# Patient Record
Sex: FEMALE | Race: WHITE | Employment: OTHER | URBAN - METROPOLITAN AREA
[De-identification: names, ages, dates, MRNs, and addresses within clinical notes are randomized per-mention and may not be internally consistent; named-entity substitution may affect disease eponyms.]

---

## 2017-08-28 ENCOUNTER — HOSPITAL ENCOUNTER (OUTPATIENT)
Dept: WOUND CARE | Facility: CLINIC | Age: 82
Discharge: HOME | End: 2017-08-28
Attending: PHYSICIAN ASSISTANT | Admitting: PHYSICIAN ASSISTANT
Payer: MEDICARE

## 2017-08-28 VITALS
HEART RATE: 83 BPM | DIASTOLIC BLOOD PRESSURE: 76 MMHG | SYSTOLIC BLOOD PRESSURE: 158 MMHG | TEMPERATURE: 96.8 F | RESPIRATION RATE: 18 BRPM

## 2017-08-28 PROCEDURE — 99203 OFFICE O/P NEW LOW 30 MIN: CPT | Mod: 25 | Performed by: PHYSICIAN ASSISTANT

## 2017-08-28 PROCEDURE — 11042 DBRDMT SUBQ TIS 1ST 20SQCM/<: CPT

## 2017-08-28 PROCEDURE — A6209 FOAM DRSG <=16 SQ IN W/O BDR: HCPCS

## 2017-08-28 PROCEDURE — 11042 DBRDMT SUBQ TIS 1ST 20SQCM/<: CPT | Performed by: PHYSICIAN ASSISTANT

## 2017-08-28 PROCEDURE — 27211191 ZZH COFLEX 2 LAYER COMPRESSION WRAP

## 2017-08-28 PROCEDURE — 99213 OFFICE O/P EST LOW 20 MIN: CPT | Mod: 25

## 2017-08-28 NOTE — PROGRESS NOTES
[facility]    HISTORY OF PRESENT ILLNESS: Ms. Sheila Johnson is a 89-year-old woman who presents today with a wound on her right lower leg. She initially bumped her leg about a month ago and then again in the same spot two weeks ago. She has had help dressing the wound by her family members who have been applying bacitracin to the wound.     Sheila has been diagnosed with PAD in the past but was told that she didn't need any interventions or vascular follow-up. She reports that her legs get tired with walking but denies cramping. Denies history of blood clots or vascular procedures. She always has some lower leg swelling at baseline but has never worn compression socks consistently.     DATE WOUND ACQUIRED: 7/28/17    PAST MEDICAL HISTORY: bilateral peripheral edema, CKD stage 3, type 2 DM, dissection of vertebral artery, dyslipidemia, HTN, lung nodule, PAD    SOCIAL HISTORY: recently moved to [place], has family in [facility] who can help with dressings as needed    MEDICATIONS:   Current Outpatient Prescriptions   Medication     HYDROcodone-acetaminophen 5-325 MG per tablet     aspirin 81 MG chewable tablet     cyclobenzaprine (FLEXERIL) 10 MG tablet     multivitamin, therapeutic with minerals (MULTI-VITAMIN) TABS     Acetaminophen (TYLENOL PO)     ATORVASTATIN CALCIUM PO     CITALOPRAM HYDROBROMIDE PO     GLIMEPIRIDE PO     insulin glargine (LANTUS) 100 UNIT/ML injection     LISINOPRIL PO     POTASSIUM CHLORIDE NORMAN CR PO     No current facility-administered medications for this encounter.        REVIEW OF SYSTEMS:  CONSTITUTIONAL: Denies fevers or acute illness  ENDOCRINE: blood sugars in good control with insulin    VITALS: /76  Pulse 83  Temp 96.8  F (36  C) (Temporal)  Resp 18    PHYSICAL EXAM:  GENERAL: Patient is alert and oriented and in no acute distress  CARDIOVASCULAR: absent RLL pedal pulses, foot is warm, 3+ pitting edema  INTEGUMENTARY:   WOUND ASSESSMENT:     Location:  right lateral lower leg     Stage/Thickness: Full    Size: 3.1 cm x 1.4 cm with a depth of 0.1 cm    Drainage: copious amount of serosanguinous drainage    Wound description: 90% moist yellow slough 10% granulation tissue, very TTP    PROCEDURE: Per standing orders, topical 4% lidocaine was applied to the wound by the [facility]. Timeout was called and informed consent was obtained. Using a 15 blade a surgical debridement was performed down to and including subcutaneous tissue of <20 cm2. Hemostasis was achieved with pressure. The patient tolerated the procedure well.     ASSESSMENT: traumatic ulcer of right lower leg with fat-layer exposed, DM 2, PAD, peripheral edema    PLAN:   1. Primary dressing: PolyMem; Secondary dressin-layer CoFlex compression wrap  2. May need a local block for future debridements if wound fails to clean up sufficiently with PolyMem  3. If fails to progress will consider further vascular workup     FOLLOW-UP: 3 weeks (with weekly nurse visits)    SHIV MELCHOR PA-C

## 2017-09-05 ENCOUNTER — HOSPITAL ENCOUNTER (OUTPATIENT)
Dept: WOUND CARE | Facility: CLINIC | Age: 82
Discharge: HOME | End: 2017-09-05
Attending: PHYSICIAN ASSISTANT | Admitting: PHYSICIAN ASSISTANT
Payer: MEDICARE

## 2017-09-05 PROCEDURE — 27211191 ZZH COFLEX 2 LAYER COMPRESSION WRAP

## 2017-09-05 PROCEDURE — 97602 WOUND(S) CARE NON-SELECTIVE: CPT

## 2017-09-05 RX ORDER — CLOPIDOGREL BISULFATE 75 MG/1
75 TABLET ORAL DAILY
COMMUNITY
Start: 2015-05-11

## 2017-09-05 RX ORDER — FUROSEMIDE 20 MG
20 TABLET ORAL DAILY
COMMUNITY
Start: 2015-07-30 | End: 2021-01-01 | Stop reason: DRUGHIGH

## 2017-09-11 ENCOUNTER — HOSPITAL ENCOUNTER (OUTPATIENT)
Dept: WOUND CARE | Facility: CLINIC | Age: 82
Discharge: HOME | End: 2017-09-11
Attending: PHYSICIAN ASSISTANT | Admitting: PHYSICIAN ASSISTANT
Payer: MEDICARE

## 2017-09-11 DIAGNOSIS — I83.009 VENOUS ULCER (H): Primary | ICD-10-CM

## 2017-09-11 DIAGNOSIS — L97.909 VENOUS ULCER (H): Primary | ICD-10-CM

## 2017-09-11 PROCEDURE — A6252 ABSORPT DRG >16 <=48 W/O BDR: HCPCS

## 2017-09-11 PROCEDURE — 97602 WOUND(S) CARE NON-SELECTIVE: CPT

## 2017-09-11 PROCEDURE — 27211191 ZZH COFLEX 2 LAYER COMPRESSION WRAP

## 2017-09-11 PROCEDURE — A6209 FOAM DRSG <=16 SQ IN W/O BDR: HCPCS

## 2017-09-11 RX ORDER — HYDROCODONE BITARTRATE AND ACETAMINOPHEN 5; 325 MG/1; MG/1
1 TABLET ORAL EVERY 6 HOURS PRN
Qty: 15 TABLET | Refills: 0 | Status: ON HOLD | OUTPATIENT
Start: 2017-09-11 | End: 2018-04-25

## 2017-09-18 ENCOUNTER — HOSPITAL ENCOUNTER (OUTPATIENT)
Dept: WOUND CARE | Facility: CLINIC | Age: 82
Discharge: HOME | End: 2017-09-18
Attending: PHYSICIAN ASSISTANT | Admitting: PHYSICIAN ASSISTANT
Payer: MEDICARE

## 2017-09-18 VITALS
SYSTOLIC BLOOD PRESSURE: 156 MMHG | DIASTOLIC BLOOD PRESSURE: 71 MMHG | RESPIRATION RATE: 18 BRPM | HEART RATE: 78 BPM | TEMPERATURE: 98.3 F

## 2017-09-18 PROCEDURE — 97597 DBRDMT OPN WND 1ST 20 CM/<: CPT | Performed by: PHYSICIAN ASSISTANT

## 2017-09-18 PROCEDURE — 27211191 ZZH COFLEX 2 LAYER COMPRESSION WRAP

## 2017-09-18 PROCEDURE — A6021 COLLAGEN DRESSING <=16 SQ IN: HCPCS

## 2017-09-18 PROCEDURE — 97597 DBRDMT OPN WND 1ST 20 CM/<: CPT

## 2017-09-18 NOTE — PROGRESS NOTES
[facility]    HISTORY OF PRESENT ILLNESS: Ms. Sheila Johnson is a 89-year-old woman who returns today to follow-up on a wound on her right lower leg. The wound started after bumping into something, she believes it was a bed frame.     At her last nurse visit Sheila reported a significant increase in pain. There was evidence of cellulitis and Bactrim was prescribed. Since initiating antibiotics Sheila reports almost complete resolution of pain. Her wound has been decreasing in size    DATE WOUND ACQUIRED: 17    PAST MEDICAL HISTORY: bilateral peripheral edema, CKD stage 3, type 2 DM, dissection of vertebral artery, dyslipidemia, HTN, lung nodule, PAD    SOCIAL HISTORY: recently moved to [place], has family in town who can help with dressings as needed    MEDICATIONS: reviewed, see med rec for updated list    VITALS: /71  Pulse 78  Temp 98.3  F (36.8  C) (Temporal)  Resp 18    PHYSICAL EXAM:  GENERAL: Patient is alert and oriented and in no acute distress  INTEGUMENTARY:   WOUND ASSESSMENT:     Location: right lateral lower leg     Stage/Thickness: Full    Size: 4 cm x 1.5 cm with a depth of 0.1 cm    Drainage: copious amount of serosanguinous drainage    Wound description: primarily granulation tissue with focal areas of yellow slough, sue-wound skin is back to baseline erythema, no induration or warmth    PROCEDURE: A selective debridement was performed using a 15 blade to remove all non-viable tissue of <20 cm. Minimal bleeding was controlled with pressure. The patient tolerated the procedure well.    ASSESSMENT: improving traumatic ulcer of right lower leg with fat-layer exposed, DM 2, PAD, peripheral edema    PLAN:   1. Primary dressing: Endoform and ActiCoat 7; Secondary dressin-layer CoFlex compression wrap    FOLLOW-UP: 3 weeks (with weekly nurse visits)    SHIV MELCHOR PA-C

## 2017-09-27 ENCOUNTER — HOSPITAL ENCOUNTER (OUTPATIENT)
Dept: WOUND CARE | Facility: CLINIC | Age: 82
Discharge: HOME | End: 2017-09-27
Attending: PHYSICIAN ASSISTANT | Admitting: PHYSICIAN ASSISTANT
Payer: MEDICARE

## 2017-09-27 PROCEDURE — A6209 FOAM DRSG <=16 SQ IN W/O BDR: HCPCS

## 2017-09-27 PROCEDURE — 17250 CHEM CAUT OF GRANLTJ TISSUE: CPT

## 2017-09-27 PROCEDURE — 27211191 ZZH COFLEX 2 LAYER COMPRESSION WRAP

## 2017-10-04 ENCOUNTER — HOSPITAL ENCOUNTER (OUTPATIENT)
Dept: WOUND CARE | Facility: CLINIC | Age: 82
Discharge: HOME | End: 2017-10-04
Attending: PHYSICIAN ASSISTANT | Admitting: PHYSICIAN ASSISTANT
Payer: MEDICARE

## 2017-10-04 VITALS — HEART RATE: 90 BPM | SYSTOLIC BLOOD PRESSURE: 160 MMHG | DIASTOLIC BLOOD PRESSURE: 79 MMHG | TEMPERATURE: 98.1 F

## 2017-10-04 DIAGNOSIS — L03.115 CELLULITIS OF RIGHT LOWER EXTREMITY: Primary | ICD-10-CM

## 2017-10-04 DIAGNOSIS — L97.912 ULCER OF RIGHT LOWER EXTREMITY WITH FAT LAYER EXPOSED (H): ICD-10-CM

## 2017-10-04 PROCEDURE — 99213 OFFICE O/P EST LOW 20 MIN: CPT | Performed by: PHYSICIAN ASSISTANT

## 2017-10-04 PROCEDURE — 97602 WOUND(S) CARE NON-SELECTIVE: CPT

## 2017-10-04 PROCEDURE — A6197 ALGINATE DRSG >16 <=48 SQ IN: HCPCS

## 2017-10-04 RX ORDER — CEPHALEXIN 500 MG/1
500 CAPSULE ORAL 3 TIMES DAILY
Qty: 30 CAPSULE | Refills: 0 | Status: ON HOLD | OUTPATIENT
Start: 2017-10-04 | End: 2018-04-25

## 2017-10-04 NOTE — PROGRESS NOTES
[facility]    HISTORY OF PRESENT ILLNESS: Ms. Sheila Johnson is a 89-year-old woman who returns today to follow-up on a wound on her right lower leg. The wound started after bumping into something, she believes it was a bed frame.     Sheila was making significant progress since her episode of cellulitis last month but unfortunately started developing a lot of pain and drainage over the past few days.     DATE WOUND ACQUIRED: 7/28/17    PAST MEDICAL HISTORY: bilateral peripheral edema, CKD stage 3, type 2 DM, dissection of vertebral artery, dyslipidemia, HTN, lung nodule, PAD    SOCIAL HISTORY: recently moved to [place], has family in town who can help with dressings as needed    MEDICATIONS: reviewed, see med rec for updated list    VITALS: /79  Pulse 90  Temp 98.1  F (36.7  C) (Tympanic)    PHYSICAL EXAM:  GENERAL: Patient is alert and oriented and in no acute distress  INTEGUMENTARY:   WOUND ASSESSMENT:     Location: right lateral lower leg     Stage/Thickness: Full    Size: 6 cm x 6.4 cm with a depth of 0.1 cm    Drainage: copious amount of serosanguinous drainage    Wound description: friable granular wound bed that is very TTP, periwound skin is erythematous    PROCEDURE: Per standing order, topical 4% lidocaine was applied to the wound by the [facility]. The wound was mechanically debrided.     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 Primary dressing: SilverCel, Secondary dressing: CriticAid, gauze or ABD, spandagrip  2. Referral for home health care 3x/week  3. Keflex TID for cellulitis    FOLLOW-UP: 2 weeks    SHIV MELCHOR PA-C

## 2017-10-19 ENCOUNTER — HOSPITAL ENCOUNTER (OUTPATIENT)
Dept: WOUND CARE | Facility: CLINIC | Age: 82
Discharge: HOME | End: 2017-10-19
Attending: PHYSICIAN ASSISTANT | Admitting: PHYSICIAN ASSISTANT
Payer: MEDICARE

## 2017-10-19 VITALS — TEMPERATURE: 97.4 F | HEART RATE: 77 BPM | SYSTOLIC BLOOD PRESSURE: 164 MMHG | DIASTOLIC BLOOD PRESSURE: 71 MMHG

## 2017-10-19 DIAGNOSIS — R60.0 LOCALIZED EDEMA: ICD-10-CM

## 2017-10-19 DIAGNOSIS — I89.0 LYMPHEDEMA OF BOTH LOWER EXTREMITIES: ICD-10-CM

## 2017-10-19 DIAGNOSIS — S81.009D OPEN WOUND OF KNEE, LEG, AND ANKLE, UNSPECIFIED LATERALITY, SUBSEQUENT ENCOUNTER: Primary | ICD-10-CM

## 2017-10-19 DIAGNOSIS — M79.89 SWELLING OF LIMB: ICD-10-CM

## 2017-10-19 DIAGNOSIS — S91.009D OPEN WOUND OF KNEE, LEG, AND ANKLE, UNSPECIFIED LATERALITY, SUBSEQUENT ENCOUNTER: Primary | ICD-10-CM

## 2017-10-19 DIAGNOSIS — L97.301: ICD-10-CM

## 2017-10-19 DIAGNOSIS — I83.003 VENOUS ULCER OF ANKLE, UNSPECIFIED LATERALITY (H): ICD-10-CM

## 2017-10-19 DIAGNOSIS — L97.309 VENOUS ULCER OF ANKLE, UNSPECIFIED LATERALITY (H): ICD-10-CM

## 2017-10-19 DIAGNOSIS — S81.809D OPEN WOUND OF KNEE, LEG, AND ANKLE, UNSPECIFIED LATERALITY, SUBSEQUENT ENCOUNTER: Primary | ICD-10-CM

## 2017-10-19 PROCEDURE — 99211 OFF/OP EST MAY X REQ PHY/QHP: CPT

## 2017-10-19 PROCEDURE — 99212 OFFICE O/P EST SF 10 MIN: CPT | Performed by: PHYSICIAN ASSISTANT

## 2017-10-24 ENCOUNTER — MEDICAL CORRESPONDENCE (OUTPATIENT)
Dept: HEALTH INFORMATION MANAGEMENT | Facility: CLINIC | Age: 82
End: 2017-10-24

## 2018-01-09 ENCOUNTER — MEDICAL CORRESPONDENCE (OUTPATIENT)
Dept: HEALTH INFORMATION MANAGEMENT | Facility: CLINIC | Age: 83
End: 2018-01-09

## 2018-04-25 ENCOUNTER — APPOINTMENT (OUTPATIENT)
Dept: CT IMAGING | Facility: CLINIC | Age: 83
DRG: 041 | End: 2018-04-25
Attending: EMERGENCY MEDICINE
Payer: MEDICARE

## 2018-04-25 ENCOUNTER — HOSPITAL ENCOUNTER (INPATIENT)
Facility: CLINIC | Age: 83
LOS: 3 days | Discharge: HOME HEALTH | DRG: 041 | End: 2018-04-28
Attending: EMERGENCY MEDICINE | Admitting: INTERNAL MEDICINE
Payer: MEDICARE

## 2018-04-25 DIAGNOSIS — G45.9 TRANSIENT CEREBRAL ISCHEMIA, UNSPECIFIED TYPE: ICD-10-CM

## 2018-04-25 DIAGNOSIS — R47.01 APHASIA: ICD-10-CM

## 2018-04-25 LAB
ALBUMIN SERPL-MCNC: 3 G/DL (ref 3.4–5)
ALBUMIN UR-MCNC: 30 MG/DL
ALP SERPL-CCNC: 75 U/L (ref 40–150)
ALT SERPL W P-5'-P-CCNC: 14 U/L (ref 0–50)
ANION GAP SERPL CALCULATED.3IONS-SCNC: 6 MMOL/L (ref 3–14)
APPEARANCE UR: CLEAR
AST SERPL W P-5'-P-CCNC: 11 U/L (ref 0–45)
BACTERIA #/AREA URNS HPF: ABNORMAL /HPF
BASOPHILS # BLD AUTO: 0.1 10E9/L (ref 0–0.2)
BASOPHILS NFR BLD AUTO: 0.4 %
BILIRUB SERPL-MCNC: 0.6 MG/DL (ref 0.2–1.3)
BILIRUB UR QL STRIP: NEGATIVE
BUN SERPL-MCNC: 19 MG/DL (ref 7–30)
CALCIUM SERPL-MCNC: 9 MG/DL (ref 8.5–10.1)
CHLORIDE SERPL-SCNC: 96 MMOL/L (ref 94–109)
CO2 SERPL-SCNC: 31 MMOL/L (ref 20–32)
COLOR UR AUTO: ABNORMAL
CREAT SERPL-MCNC: 0.94 MG/DL (ref 0.52–1.04)
DIFFERENTIAL METHOD BLD: ABNORMAL
EOSINOPHIL # BLD AUTO: 0.1 10E9/L (ref 0–0.7)
EOSINOPHIL NFR BLD AUTO: 0.4 %
ERYTHROCYTE [DISTWIDTH] IN BLOOD BY AUTOMATED COUNT: 13.1 % (ref 10–15)
GFR SERPL CREATININE-BSD FRML MDRD: 56 ML/MIN/1.7M2
GLUCOSE BLDC GLUCOMTR-MCNC: 293 MG/DL (ref 70–99)
GLUCOSE SERPL-MCNC: 325 MG/DL (ref 70–99)
GLUCOSE UR STRIP-MCNC: >1000 MG/DL
HBA1C MFR BLD: 9.8 % (ref 0–6.4)
HCT VFR BLD AUTO: 40.1 % (ref 35–47)
HGB BLD-MCNC: 13.3 G/DL (ref 11.7–15.7)
HGB UR QL STRIP: NEGATIVE
HYALINE CASTS #/AREA URNS LPF: 1 /LPF (ref 0–2)
IMM GRANULOCYTES # BLD: 0 10E9/L (ref 0–0.4)
IMM GRANULOCYTES NFR BLD: 0.3 %
KETONES UR STRIP-MCNC: NEGATIVE MG/DL
LEUKOCYTE ESTERASE UR QL STRIP: NEGATIVE
LYMPHOCYTES # BLD AUTO: 3.1 10E9/L (ref 0.8–5.3)
LYMPHOCYTES NFR BLD AUTO: 23.1 %
MCH RBC QN AUTO: 29.4 PG (ref 26.5–33)
MCHC RBC AUTO-ENTMCNC: 33.2 G/DL (ref 31.5–36.5)
MCV RBC AUTO: 89 FL (ref 78–100)
MONOCYTES # BLD AUTO: 0.9 10E9/L (ref 0–1.3)
MONOCYTES NFR BLD AUTO: 6.3 %
MUCOUS THREADS #/AREA URNS LPF: PRESENT /LPF
NEUTROPHILS # BLD AUTO: 9.4 10E9/L (ref 1.6–8.3)
NEUTROPHILS NFR BLD AUTO: 69.5 %
NITRATE UR QL: NEGATIVE
NRBC # BLD AUTO: 0 10*3/UL
NRBC BLD AUTO-RTO: 0 /100
PH UR STRIP: 7 PH (ref 5–7)
PLATELET # BLD AUTO: 313 10E9/L (ref 150–450)
POTASSIUM SERPL-SCNC: 4 MMOL/L (ref 3.4–5.3)
PROT SERPL-MCNC: 7.6 G/DL (ref 6.8–8.8)
RBC # BLD AUTO: 4.52 10E12/L (ref 3.8–5.2)
RBC #/AREA URNS AUTO: 1 /HPF (ref 0–2)
SODIUM SERPL-SCNC: 133 MMOL/L (ref 133–144)
SOURCE: ABNORMAL
SP GR UR STRIP: 1.01 (ref 1–1.03)
SQUAMOUS #/AREA URNS AUTO: 3 /HPF (ref 0–1)
TROPONIN I SERPL-MCNC: 0.03 UG/L (ref 0–0.04)
UROBILINOGEN UR STRIP-MCNC: NORMAL MG/DL (ref 0–2)
WBC # BLD AUTO: 13.6 10E9/L (ref 4–11)
WBC #/AREA URNS AUTO: 3 /HPF (ref 0–5)

## 2018-04-25 PROCEDURE — 00000146 ZZHCL STATISTIC GLUCOSE BY METER IP

## 2018-04-25 PROCEDURE — 25000128 H RX IP 250 OP 636: Performed by: INTERNAL MEDICINE

## 2018-04-25 PROCEDURE — 80053 COMPREHEN METABOLIC PANEL: CPT | Performed by: EMERGENCY MEDICINE

## 2018-04-25 PROCEDURE — 70450 CT HEAD/BRAIN W/O DYE: CPT | Mod: XS

## 2018-04-25 PROCEDURE — 25000128 H RX IP 250 OP 636: Performed by: EMERGENCY MEDICINE

## 2018-04-25 PROCEDURE — 87040 BLOOD CULTURE FOR BACTERIA: CPT | Performed by: INTERNAL MEDICINE

## 2018-04-25 PROCEDURE — 85025 COMPLETE CBC W/AUTO DIFF WBC: CPT | Performed by: EMERGENCY MEDICINE

## 2018-04-25 PROCEDURE — 25000132 ZZH RX MED GY IP 250 OP 250 PS 637: Mod: GY | Performed by: EMERGENCY MEDICINE

## 2018-04-25 PROCEDURE — 25000131 ZZH RX MED GY IP 250 OP 636 PS 637: Mod: GY | Performed by: INTERNAL MEDICINE

## 2018-04-25 PROCEDURE — 25000125 ZZHC RX 250: Performed by: EMERGENCY MEDICINE

## 2018-04-25 PROCEDURE — 70460 CT HEAD/BRAIN W/DYE: CPT

## 2018-04-25 PROCEDURE — 36415 COLL VENOUS BLD VENIPUNCTURE: CPT | Performed by: INTERNAL MEDICINE

## 2018-04-25 PROCEDURE — 70496 CT ANGIOGRAPHY HEAD: CPT

## 2018-04-25 PROCEDURE — 25000125 ZZHC RX 250: Performed by: INTERNAL MEDICINE

## 2018-04-25 PROCEDURE — 99285 EMERGENCY DEPT VISIT HI MDM: CPT | Mod: 25

## 2018-04-25 PROCEDURE — 93010 ELECTROCARDIOGRAM REPORT: CPT | Performed by: INTERNAL MEDICINE

## 2018-04-25 PROCEDURE — 93005 ELECTROCARDIOGRAM TRACING: CPT

## 2018-04-25 PROCEDURE — 70450 CT HEAD/BRAIN W/O DYE: CPT

## 2018-04-25 PROCEDURE — 40000275 ZZH STATISTIC RCP TIME EA 10 MIN

## 2018-04-25 PROCEDURE — 99223 1ST HOSP IP/OBS HIGH 75: CPT | Mod: AI | Performed by: INTERNAL MEDICINE

## 2018-04-25 PROCEDURE — 83036 HEMOGLOBIN GLYCOSYLATED A1C: CPT | Performed by: EMERGENCY MEDICINE

## 2018-04-25 PROCEDURE — 12000000 ZZH R&B MED SURG/OB

## 2018-04-25 PROCEDURE — 84484 ASSAY OF TROPONIN QUANT: CPT | Performed by: INTERNAL MEDICINE

## 2018-04-25 PROCEDURE — A9270 NON-COVERED ITEM OR SERVICE: HCPCS | Mod: GY | Performed by: EMERGENCY MEDICINE

## 2018-04-25 PROCEDURE — 25000131 ZZH RX MED GY IP 250 OP 636 PS 637: Mod: GY

## 2018-04-25 PROCEDURE — 81001 URINALYSIS AUTO W/SCOPE: CPT | Performed by: EMERGENCY MEDICINE

## 2018-04-25 RX ORDER — FLUTICASONE PROPIONATE 50 MCG
2 SPRAY, SUSPENSION (ML) NASAL DAILY
COMMUNITY

## 2018-04-25 RX ORDER — CEPHALEXIN 500 MG/1
500 CAPSULE ORAL 3 TIMES DAILY
Status: CANCELLED | OUTPATIENT
Start: 2018-04-25

## 2018-04-25 RX ORDER — SODIUM CHLORIDE 9 MG/ML
INJECTION, SOLUTION INTRAVENOUS CONTINUOUS
Status: DISCONTINUED | OUTPATIENT
Start: 2018-04-25 | End: 2018-04-25

## 2018-04-25 RX ORDER — ONDANSETRON 4 MG/1
4 TABLET, ORALLY DISINTEGRATING ORAL EVERY 6 HOURS PRN
Status: DISCONTINUED | OUTPATIENT
Start: 2018-04-25 | End: 2018-04-28 | Stop reason: HOSPADM

## 2018-04-25 RX ORDER — ASPIRIN 300 MG/1
300 SUPPOSITORY RECTAL ONCE
Status: DISCONTINUED | OUTPATIENT
Start: 2018-04-25 | End: 2018-04-25

## 2018-04-25 RX ORDER — IOPAMIDOL 755 MG/ML
120 INJECTION, SOLUTION INTRAVASCULAR ONCE
Status: COMPLETED | OUTPATIENT
Start: 2018-04-25 | End: 2018-04-25

## 2018-04-25 RX ORDER — PROCHLORPERAZINE 25 MG
12.5 SUPPOSITORY, RECTAL RECTAL EVERY 12 HOURS PRN
Status: DISCONTINUED | OUTPATIENT
Start: 2018-04-25 | End: 2018-04-28 | Stop reason: HOSPADM

## 2018-04-25 RX ORDER — HYDRALAZINE HYDROCHLORIDE 20 MG/ML
10-20 INJECTION INTRAMUSCULAR; INTRAVENOUS
Status: DISCONTINUED | OUTPATIENT
Start: 2018-04-25 | End: 2018-04-27

## 2018-04-25 RX ORDER — NALOXONE HYDROCHLORIDE 0.4 MG/ML
.1-.4 INJECTION, SOLUTION INTRAMUSCULAR; INTRAVENOUS; SUBCUTANEOUS
Status: DISCONTINUED | OUTPATIENT
Start: 2018-04-25 | End: 2018-04-28 | Stop reason: HOSPADM

## 2018-04-25 RX ORDER — ONDANSETRON 2 MG/ML
4 INJECTION INTRAMUSCULAR; INTRAVENOUS EVERY 6 HOURS PRN
Status: DISCONTINUED | OUTPATIENT
Start: 2018-04-25 | End: 2018-04-28 | Stop reason: HOSPADM

## 2018-04-25 RX ORDER — OXYCODONE HYDROCHLORIDE 5 MG/1
5 TABLET ORAL EVERY 6 HOURS PRN
Status: DISCONTINUED | OUTPATIENT
Start: 2018-04-25 | End: 2018-04-27

## 2018-04-25 RX ORDER — LABETALOL HYDROCHLORIDE 5 MG/ML
10-40 INJECTION, SOLUTION INTRAVENOUS EVERY 10 MIN PRN
Status: DISCONTINUED | OUTPATIENT
Start: 2018-04-25 | End: 2018-04-27

## 2018-04-25 RX ORDER — ASPIRIN 300 MG/1
300 SUPPOSITORY RECTAL DAILY
Status: DISCONTINUED | OUTPATIENT
Start: 2018-04-26 | End: 2018-04-26

## 2018-04-25 RX ORDER — POLYETHYLENE GLYCOL 3350 17 G/17G
17 POWDER, FOR SOLUTION ORAL DAILY PRN
Status: DISCONTINUED | OUTPATIENT
Start: 2018-04-25 | End: 2018-04-28 | Stop reason: HOSPADM

## 2018-04-25 RX ORDER — CLOPIDOGREL BISULFATE 75 MG/1
300 TABLET ORAL ONCE
Status: COMPLETED | OUTPATIENT
Start: 2018-04-25 | End: 2018-04-25

## 2018-04-25 RX ORDER — NICOTINE POLACRILEX 4 MG
15-30 LOZENGE BUCCAL
Status: DISCONTINUED | OUTPATIENT
Start: 2018-04-25 | End: 2018-04-28 | Stop reason: HOSPADM

## 2018-04-25 RX ORDER — CLOPIDOGREL BISULFATE 75 MG/1
75 TABLET ORAL DAILY
Status: DISCONTINUED | OUTPATIENT
Start: 2018-04-26 | End: 2018-04-25

## 2018-04-25 RX ORDER — MENTHOL AND ZINC OXIDE .44; 20.625 G/100G; G/100G
OINTMENT TOPICAL 2 TIMES DAILY PRN
COMMUNITY

## 2018-04-25 RX ORDER — ACETAMINOPHEN 650 MG/1
650 SUPPOSITORY RECTAL EVERY 4 HOURS PRN
Status: DISCONTINUED | OUTPATIENT
Start: 2018-04-25 | End: 2018-04-28 | Stop reason: HOSPADM

## 2018-04-25 RX ORDER — PROCHLORPERAZINE MALEATE 5 MG
5 TABLET ORAL EVERY 6 HOURS PRN
Status: DISCONTINUED | OUTPATIENT
Start: 2018-04-25 | End: 2018-04-28 | Stop reason: HOSPADM

## 2018-04-25 RX ORDER — ACETAMINOPHEN 325 MG/1
650 TABLET ORAL EVERY 4 HOURS PRN
Status: DISCONTINUED | OUTPATIENT
Start: 2018-04-25 | End: 2018-04-28 | Stop reason: HOSPADM

## 2018-04-25 RX ORDER — ATORVASTATIN CALCIUM 40 MG/1
40 TABLET, FILM COATED ORAL DAILY
Status: DISCONTINUED | OUTPATIENT
Start: 2018-04-26 | End: 2018-04-28 | Stop reason: HOSPADM

## 2018-04-25 RX ORDER — DEXTROSE MONOHYDRATE 25 G/50ML
25-50 INJECTION, SOLUTION INTRAVENOUS
Status: DISCONTINUED | OUTPATIENT
Start: 2018-04-25 | End: 2018-04-28 | Stop reason: HOSPADM

## 2018-04-25 RX ORDER — CEFAZOLIN SODIUM 2 G/100ML
2 INJECTION, SOLUTION INTRAVENOUS EVERY 8 HOURS
Status: DISCONTINUED | OUTPATIENT
Start: 2018-04-25 | End: 2018-04-27

## 2018-04-25 RX ORDER — CITALOPRAM HYDROBROMIDE 20 MG/1
20 TABLET ORAL DAILY
Status: DISCONTINUED | OUTPATIENT
Start: 2018-04-26 | End: 2018-04-26

## 2018-04-25 RX ORDER — ACETAMINOPHEN 325 MG/1
650 TABLET ORAL EVERY 4 HOURS PRN
Status: DISCONTINUED | OUTPATIENT
Start: 2018-04-25 | End: 2018-04-25

## 2018-04-25 RX ORDER — SODIUM CHLORIDE 9 MG/ML
INJECTION, SOLUTION INTRAVENOUS CONTINUOUS
Status: DISCONTINUED | OUTPATIENT
Start: 2018-04-25 | End: 2018-04-26

## 2018-04-25 RX ORDER — BISACODYL 10 MG
10 SUPPOSITORY, RECTAL RECTAL DAILY PRN
Status: DISCONTINUED | OUTPATIENT
Start: 2018-04-25 | End: 2018-04-28 | Stop reason: HOSPADM

## 2018-04-25 RX ORDER — HYDROMORPHONE HYDROCHLORIDE 1 MG/ML
.3-.5 INJECTION, SOLUTION INTRAMUSCULAR; INTRAVENOUS; SUBCUTANEOUS
Status: DISCONTINUED | OUTPATIENT
Start: 2018-04-25 | End: 2018-04-28 | Stop reason: HOSPADM

## 2018-04-25 RX ORDER — CLOPIDOGREL BISULFATE 75 MG/1
75 TABLET ORAL
Status: DISCONTINUED | OUTPATIENT
Start: 2018-04-25 | End: 2018-04-26

## 2018-04-25 RX ADMIN — IOPAMIDOL 120 ML: 755 INJECTION, SOLUTION INTRAVENOUS at 21:11

## 2018-04-25 RX ADMIN — CEFAZOLIN SODIUM 2 G: 2 INJECTION, SOLUTION INTRAVENOUS at 22:48

## 2018-04-25 RX ADMIN — FAMOTIDINE 20 MG: 10 INJECTION, SOLUTION INTRAVENOUS at 22:54

## 2018-04-25 RX ADMIN — SODIUM CHLORIDE: 9 INJECTION, SOLUTION INTRAVENOUS at 23:15

## 2018-04-25 RX ADMIN — INSULIN ASPART 4 UNITS: 100 INJECTION, SOLUTION INTRAVENOUS; SUBCUTANEOUS at 23:30

## 2018-04-25 RX ADMIN — SODIUM CHLORIDE 100 ML: 9 INJECTION, SOLUTION INTRAVENOUS at 21:12

## 2018-04-25 RX ADMIN — INSULIN GLARGINE 20 UNITS: 100 INJECTION, SOLUTION SUBCUTANEOUS at 23:45

## 2018-04-25 RX ADMIN — SODIUM CHLORIDE: 9 INJECTION, SOLUTION INTRAVENOUS at 22:28

## 2018-04-25 RX ADMIN — SODIUM CHLORIDE 500 ML: 9 INJECTION, SOLUTION INTRAVENOUS at 23:32

## 2018-04-25 RX ADMIN — CLOPIDOGREL 300 MG: 75 TABLET, FILM COATED ORAL at 21:37

## 2018-04-25 ASSESSMENT — ACTIVITIES OF DAILY LIVING (ADL)
TRANSFERRING: 1-->ASSISTIVE EQUIPMENT
RETIRED_COMMUNICATION: 0-->UNDERSTANDS/COMMUNICATES WITHOUT DIFFICULTY
COGNITION: 0 - NO COGNITION ISSUES REPORTED
SWALLOWING: 0-->SWALLOWS FOODS/LIQUIDS WITHOUT DIFFICULTY
TOILETING: 1-->ASSISTIVE EQUIPMENT
NUMBER_OF_TIMES_PATIENT_HAS_FALLEN_WITHIN_LAST_SIX_MONTHS: 1
BATHING: 1-->ASSISTIVE EQUIPMENT
DRESS: 0-->INDEPENDENT
AMBULATION: 1-->ASSISTIVE EQUIPMENT
FALL_HISTORY_WITHIN_LAST_SIX_MONTHS: YES
RETIRED_EATING: 0-->INDEPENDENT

## 2018-04-25 ASSESSMENT — VISUAL ACUITY: OU: GLASSES;NORMAL ACUITY

## 2018-04-25 NOTE — ED PROVIDER NOTES
History     Chief Complaint:  Aphasia    HPI  Sheila Johnson is a 89 year old female who presents with aphasia. The patient reports that while attending an art class this morning at 10:30 am (approximately 8 hours ago) she began noticing that she was having difficulty speaking. This concerned her as it felt similar to the symptoms she experienced with her previous stroke. This persisted for slightly over an hour, and upon arrival here in the ED she states she feels better but has not yet returned to her baseline. While here she denies any lingering symptoms from her previous strokes or new symptoms today included no numbness or weakness. She notes that she is currently anticoagulated on Plavix.     Allergies:  No Known Drug Allergies     Medications:    Atorvastatin  Keflex  Citalopram  Plavix  Lasix  Glimepiride  Wilmot  Lantus     Past Medical History:    Arthritis  Depressive disorder  Diabetes  Hypercholesteremia  Hypertension    Past Surgical History:    Orthopedic surgery  Carpal tunnel release    Family History:    The patient denies any relevant family medical history.    Social History:  Smoking Status: Former  Alcohol Use: No  Marital Status:        Review of Systems   Neurological: Positive for speech difficulty and headaches. Negative for weakness and numbness.   Psychiatric/Behavioral: Negative for confusion.   All other systems reviewed and are negative.    Physical Exam   Vitals:  Patient Vitals for the past 24 hrs:   BP Temp Temp src Pulse Heart Rate Resp SpO2   04/25/18 1826 - - - - 75 17 91 %   04/25/18 1824 - - - - 76 16 95 %   04/25/18 1821 144/90 99.4  F (37.4  C) Oral 74 - 16 94 %     Physical Exam  Vitals: reviewed by me  General: Pt seen on [facility], pleasant, cooperative, and alert to conversation  Eyes: Tracking well, clear conjunctiva BL  ENT: MMM, midline trachea.   Lungs:   No tachypnea, no accessory muscle use. No respiratory distress.   CV: Rate as above, regular  "rhythm.  No murmur to auscultation  Abd: Soft, non tender, no guarding, no rebound. Non distended  MSK: no peripheral edema or joint effusion.  No evidence of trauma  Skin: No rash, normal turgor and temperature  Neuro: Clear speech and no facial droop.  BUE and BLE with SILT and 5/5 motor throughout  CN 2 - 12 in tact  Can say \"no ifs, ands or buts\" without issues  Alert and oriented times place and purpose, and year  Psych: Not RIS, no e/o AH/VH      Emergency Department Course     ECG:  ECG taken at 1813, ECG read at 1819  Sinus rhtyhm with 1st degree AV block with blocked premature atrial complexes  Right bundle branch block  Left anterior fascicular block  Bifascicular block  Abnormal ECG  Rate 60 bpm. VT interval 330. QRS duration 136. QT/QTc 454/454. P-R-T axes 72 -80 -18.    Imaging:  Radiology findings were communicated with the patient who voiced understanding of the findings.  CT Head w/o contrast:  Chronic changes. No evidence for intracranial hemorrhage or any acute process. Moderate mucosal thickening noted in the right frontal and ethmoid air cells as well as both maxillary sinuses.  Per Radiologist.       Laboratory:  Laboratory findings were communicated with the patient who voiced understanding of the findings.  UA: Glucose: >1000 (A), Protein albumin: 30 (A), Bacteria: Few (A), Squamous epithelial: 3 (H), Mucous: Present (A)  CBC: WBC: 13.6 (H) o/w WNL. (HGB 13.3, )   CMP: Glucose: 325 (H), GFR: 56 (L), Albumin: 3.0 (L) o/w WNL (Creatinine 0.94)  Hemoglobin A1c: 9.8 (H)    Emergency Department Course:  Nursing notes and vitals reviewed.  EKG obtained in the ED, see results above.   1815 I had my initial encounter with the patient.  I performed an exam of the patient as documented above.   1826 I spoke with Dr. Wilson of Neurology regarding this patient.  IV was inserted and blood was drawn for laboratory testing, results above.  The patient provided a urine sample here in the emergency " department. This was sent for laboratory testing, findings above.  The patient was sent for a CT while in the emergency department, results above.   2016 I spoke with Dr. Lanza regarding this patient.  2048 I rechecked the patient's condition.   2048 Code stroke called here in the ED.  2054 I spoke with Dr. Wilson regarding this patient.  The patient was sent for a CT while in the emergency department, results above.     1931 I discussed the treatment plan with the patient. They expressed understanding of this plan and consented to admission. I discussed the patient with Dr. Lanza, who will admit the patient to a monitored bed for further evaluation and treatment.    Impression & Plan      Medical Decision Making:  Sheila Johnson is a 89 year old female who presents to the emergency department today with what appears to be a TIA, manifest by aphasia for roughly an hour, starting at roughly 10:30 today. Patient now has a neurologic exam, and I feel the diagnosis of TIA is most likely given that again her symptoms have nearly completely resolved, she has no discernable deficits on my exam, CT scan of the head is negative (done as patent complains of mild headache and is on Plavix), and has no other signs or symptoms of infection at this point. I am pursuing a broad workup, for possible UTI vs infectious cause including alternative etiologies, and I do appreciate a white count though it is chronically elevated, as well as elevated blood sugar. however I do think that the patient would benefit from workup including MRI and possible imaging of her neck for TIA given her age and cardiovascular risk factors. I will admit to the care of Dr. Lanza who generously accepted care of the patient. I did also of note speak briefly with Dr. Wilson at time of onset, given patient's history and that technically her symptoms were within 24 hours, however we elected to not do a code stroke as she had no deficits at this time, and they  can follow up closely in the inpatient setting. I did speak with numerous family members at bedside, and all their questions were answered as well. We'll observe Ms Johnson very closely until a bed is available.       We will also note that while the patient was being interviewed by the admitting hospitalist, she again had an element of a aphasia that was confirmed on my repeat exam, she was unable to phonate and unable to articulate at any level near what she was able to do only an hour prior.  At this point a code stroke was called, and all imaging was found to be negative.  Dr. Wilson of neurology was following, will pursue advanced imaging inpatient, and did recommend Plavix 300mg as patient states somewhat med non-compliant    Diagnosis:    ICD-10-CM    1. Transient cerebral ischemia, unspecified type G45.9    2. Aphasia R47.01      Disposition:   Admission    Scribe Disclosure:  I, Isra Larkin, am serving as a scribe at 6:15 PM on 4/25/2018 to document services personally performed by Tom Gamboa*, based on my observations and the provider's statements to me.    4/25/2018    EMERGENCY DEPARTMENT       Tom Gamboa MD  04/26/18 0035       Tom Gamboa MD  04/26/18 0102

## 2018-04-25 NOTE — IP AVS SNAPSHOT
MRN:[id number]                      After Visit Summary   4/25/2018    Sheila Johnson    MRN: [id number]           Thank you!     Thank you for choosing [facility] for your care. Our goal is always to provide you with excellent care. Hearing back from our patients is one way we can continue to improve our services. Please take a few minutes to complete the written survey that you may receive in the mail after you visit with us. Thank you!        Patient Information     Date Of Birth          2/13/1924        Designated Caregiver       Most Recent Value    Caregiver    Will someone help with your care after discharge? yes    Name of designated caregiver dede    Phone number of caregiver [phone]    Caregiver address Local      About your hospital stay     You were admitted on:  April 25, 2018 You last received care in the:  [facility]    You were discharged on:  April 28, 2018       Who to Call     For medical emergencies, please call 911.  For non-urgent questions about your medical care, please call your primary care provider or clinic, [phone]          Attending Provider     Provider Specialty    Tom Gamboa MD Emergency Medicine    Tamara Lanza MD Internal Medicine       Primary Care Provider Office Phone # Fax #    Thania Cadena [phone]      After Care Instructions     Activity       Your activity upon discharge: activity as tolerated            Diet       Follow this diet upon discharge: Orders Placed This Encounter      Moderate Consistent CHO Diet            Discharge Instructions - Follow up with Device Check RN        Follow up with Device Check RN in 7-10 days.            Discharge Instructions - Keep incision dry for 72 hours       Keep incision dry for 72 hours (unless Derma Miranda was applied)                  Follow-up Appointments     Follow-up and recommended labs and tests        F/u cards/ neuro  as recommended. Cards in 7-10 days. Neuro 4-6 weeks  F/u P MD in 1-2 weeks                  Additional Services     Follow-Up with Device Clinic           Home Care OT Referral for Hospital Discharge       OT to eval and treat    Your provider has ordered home care - occupational therapy. If you have not been contacted within 2 days of your discharge please call the department phone number listed on the top of this document.            Home Care PT Referral for Hospital Discharge       PT to eval and treat    Your provider has ordered home care - physical therapy. If you have not been contacted within 2 days of your discharge please call the department phone number listed on the top of this document.            Home care nursing referral       RN   Your provider has ordered home care nursing services. If you have not been contacted within 2 days of your discharge please call the inpatient department phone number at [phone] .                  Future tests that were ordered for you     HOME HEALTH CARE SUPERV,30+ MIN                 Further instructions from your care team       Hospital follow up appointment has been made for you with Dr. Cadena on Monday May 7th at 2:20pm  Please call if you need to change appointment [phone].  A follow-up appointment was scheduled for you [see above].  It is very important to go to it--bring these papers and your insurance card with you.  At the visit, talk about your hospital stay.  Tell your doctor how you feel.  Show your new list of medications.  Your doctor will update records, make sure you are still doing OK, and decide if any tests or medication changes are needed.        Please follow up with neurology in 2 weeks. You may call any of the following neurology clinics for appointment. Tell them you were hospitalized and need follow up as recommended at discharge.    1. [facility]   [address], [address]   [place], [place]  "[address]   [phone]    You need to call this neurology office next week to set up a hospital follow up appointment. ( see above number) you need to be seen in 2 weeks.     [facility] will call you to set up follow up for your pacemaker check: if you do not hear from them by early this week please call them to set this up: phone : [phone].    [facility] phone [phone] if you do not hear from them.    Pending Results     Date and Time Order Name Status Description    4/27/2018 1317 EKG 12-lead, tracing only Preliminary     4/27/2018 1317 X-ray Chest 2 vws* Preliminary     4/26/2018 1458 EKG 12-lead, tracing only Preliminary     4/25/2018 2201 Blood culture Preliminary     4/25/2018 2201 Blood culture Preliminary             Statement of Approval     Ordered          04/28/18 1037  I have reviewed and agree with all the recommendations and orders detailed in this document.  EFFECTIVE NOW     Approved and electronically signed by:  Davon Jeong MD             Admission Information     Date & Time Provider Department Dept. Phone    4/25/2018 Tamara Lanza MD [facility] [phone]      Your Vitals Were     Blood Pressure Pulse Temperature Respirations Height Weight    133/80 72 97.6  F (36.4  C) (Oral) 14 1.448 m (4' 9\") 99.8 kg (220 lb)    Pulse Oximetry BMI (Body Mass Index)                94% 47.61 kg/m2          [employer] Information     [employer] lets you send messages to your doctor, view your test results, renew your prescriptions, schedule appointments and more. To sign up, go to [email or web address] . Click on \"Log in\" on the left side of the screen, which will take you to the Welcome page. Then click on \"Sign up Now\" on the right side of the page.     You will be asked to enter the access code listed below, as well as some personal information. Please follow the directions to create your username and password.     Your access code is: " [email or web address]  Expires: 2018 10:21 AM     Your access code will  in 90 days. If you need help or a new code, please call your [facility] clinic or [phone].        Care EveryWhere ID     This is your Care EveryWhere ID. This could be used by other organizations to access your [facility] medical records  [id number]        Equal Access to Services     STEVIE BENNETT : Hadii aad ku hadasho Soomaali, waaxda luqadaha, qaybta kaalmada adeegyada, waxay idiin hayaan adedennis escalera laMasonmarta . So [facility] [phone].    ATENCIÓN: Si habla jacquelin, tiene a junior disposición servicios gratuitos de asistencia lingüística. Llame al [phone].    We comply with applicable federal civil rights laws and [place] laws. We do not discriminate on the basis of race, color, national origin, age, disability, sex, sexual orientation, or gender identity.               Review of your medicines      CONTINUE these medicines which have NOT CHANGED        Dose / Directions    ATORVASTATIN CALCIUM PO        Dose:  40 mg   Take 40 mg by mouth daily.   Refills:  0       CITALOPRAM HYDROBROMIDE PO        Dose:  20 mg   Take 20 mg by mouth daily.   Refills:  0       clopidogrel 75 MG tablet   Commonly known as:  PLAVIX        Dose:  75 mg   Take 75 mg by mouth daily   Refills:  0       fluticasone 50 MCG/ACT spray   Commonly known as:  FLONASE        Dose:  2 spray   Spray 2 sprays into both nostrils daily as needed for rhinitis or allergies   Refills:  0       furosemide 20 MG tablet   Commonly known as:  LASIX        Dose:  20 mg   Take 20 mg by mouth daily   Refills:  0       LEVEMIR FLEXPEN/FLEXTOUCH 100 UNIT/ML injection   Generic drug:  insulin detemir        Dose:  44 Units   Inject 44 Units Subcutaneous every morning   Refills:  0       menthol-zinc oxide 0.44-20.625 % Oint ointment   Commonly known as:  CALMOSEPTINE        Apply topically 2 times daily as needed for skin protection   Refills:  0       order for DME   Used for:  Open  wound of knee, leg, and ankle, unspecified laterality, subsequent encounter, Swelling of limb, Localized edema, Lymphedema of both lower extremities, Chronic ulcer of ankle, unspecified laterality, 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), Venous ulcer of ankle, unspecified laterality (H)        [employer] Phone #[phone] Fax #[phone] Ready To Wear Knee High Compression Stockings 20-30 mmHg  May need carlene and liner  Length of Need: Life Time # of Pairs 3   Quantity:  3 Device   Refills:  0       SINGULAIR PO        Dose:  10 mg   Take 10 mg by mouth At Bedtime   Refills:  0       TYLENOL PO        Dose:  1000 mg   Take 1,000 mg by mouth every morning Has stopped taking since Mon, Feb 11th or Tues, Feb 12th, 2013 when she started taking Vicodin   Refills:  0                Protect others around you: Learn how to safely use, store and throw away your medicines at www.disposemymeds.org.             Medication List: This is a list of all your medications and when to take them. Check marks below indicate your daily home schedule. Keep this list as a reference.      Medications           Morning Afternoon Evening Bedtime As Needed    ATORVASTATIN CALCIUM PO   Take 40 mg by mouth daily.   Last time this was given:  40 mg on 4/28/2018  8:57 AM   Next Dose Due:  4/29 9 am                                   CITALOPRAM HYDROBROMIDE PO   Take 20 mg by mouth daily.   Next Dose Due:  4/29 9am                                   clopidogrel 75 MG tablet   Commonly known as:  PLAVIX   Take 75 mg by mouth daily   Last time this was given:  75 mg on 4/28/2018  8:57 AM   Next Dose Due:  4/29 9 am                                   fluticasone 50 MCG/ACT spray   Commonly known as:  FLONASE   Spray 2 sprays into both nostrils daily as needed for rhinitis or allergies                                   furosemide 20 MG tablet   Commonly known as:  LASIX   Take 20 mg by mouth daily   Next Dose Due:  4/29 9am                                    LEVEMIR FLEXPEN/FLEXTOUCH 100 UNIT/ML injection   Inject 44 Units Subcutaneous every morning   Generic drug:  insulin detemir   Next Dose Due:  4/29 9 am                                   menthol-zinc oxide 0.44-20.625 % Oint ointment   Commonly known as:  CALMOSEPTINE   Apply topically 2 times daily as needed for skin protection                                   order for DME   [employer] Phone #[phone] Fax #[phone] Ready To Wear Knee High Compression Stockings 20-30 mmHg  May need carlene and liner  Length of Need: Life Time # of Pairs 3                                   SINGULAIR PO   Take 10 mg by mouth At Bedtime   Last time this was given:  10 mg on 4/27/2018  9:33 PM   Next Dose Due:  4/28 9 pm                                   TYLENOL PO   Take 1,000 mg by mouth every morning Has stopped taking since Mon, Feb 11th or Tues, Feb 12th, 2013 when she started taking Vicodin   Last time this was given:  650 mg on 4/28/2018  5:20 AM                                             More Information        Vancomycin-resistant enterococci (VRE)    What is VRE?  Enterococcus bacteria is a germ that lives in the intestinal tract and the mouth. Sometimes the bacteria causes no problem, or it causes a mild infection. Some Enterococcus infections can be easily treated with antibiotics. But vancomycin-resistant enterococci (VRE) can t. It s called vancomycin-resistant because the antibiotic vancomycin, which used to be effective treatment, no longer works. VRE germs are often resistant to many other antibiotics as well. There are two different ways VRE can appear in the body:     Colonization: When a person carries the VRE bacteria but is healthy. This person can spread VRE to others.    Infection: When a person gets sick because of the bacteria, it's called being infected with VRE. This person can also spread VRE to others. If not treated properly, VRE infections can be very  serious.  What are the symptoms of VRE infection?  VRE causes different symptoms, depending on where the infection is. Common places and symptoms include    Urinary tract: pain and burning when urinating, the need to urinate more often, fever    Skin wound: redness of the skin around the wound and oozing of fluid from the wound    Blood: high fever, chills, nausea and vomiting, shortness of breath, confusion    Heart: Infection of the lining of the heart (endocarditis)  Who s at risk?  Anyone can get a VRE infection. But certain factors make infection more likely, including:    Current or recent care at a healthcare facility    A recent surgery    Having the VRE germ in your system and getting sick with something else    Using antibiotics for a long period of time    Having a weakened immune system    Having a medical device that stays in the body for a time, such as urinary or intravenous (IV) catheters  How does VRE spread?  Ways that VRE can spread include:     Someone who is colonized or infected with VRE touches you with unwashed hands.    You touch objects or surfaces that have the germs.    Healthcare workers touch you without washing their hands properly after contact with an infected patient, object, or surface.  How is VRE treated?  Because VRE germs are resistant to many kinds of antibiotics, your care team will tell you how you ll be treated. Most likely you will take a combination of several antibiotics.  Controlling and Preventing VRE: In the hospital  Hospitals and nursing homes control and prevent VRE by doing the following:    Handwashing. This is the single most important way to prevent the spread of germs.    Protective clothing. Health care workers and visitors may wear gloves and a gown when entering the room of a patient with VRE. They remove these items before leaving.    Private rooms. Patients with VRE infections are placed in private rooms or in a room with others who have the same  infection.    Avoid antibiotic overuse. Too much use can cause germs to resist some antibiotics.    Monitoring. Hospitals monitor the spread of VRE and educate all staff on the best ways to prevent it.  What you can do as a patient?    Ask all hospital staff to wash their hands before touching you. Don t be afraid to speak up!    Wash your own hands often with soap and warm water, or use an alcohol-based hand gel. This is especially important:  ? After using the bathroom  ? After touching a bandage  ? Before eating    Encourage family and friends to wash hands as well    If you need to have a test done, such as an X-ray, follow instructions from staff. You may need to change into a clean hospital gown and wash your hands just before leaving your room.  Controlling and Preventing VRE: at Home  Patients:    Wash your hands often with soap and warm water, or use an alcohol-based hand gel. This is especially important:  ? After using the bathroom  ? After touching a bandage  ? Before eating    Follow instructions we give you for caring for surgical wounds or any tubes that you have, such as a catheter or dialysis port.    Keep cuts and scrapes clean and covered until they heal.    Do not share towels, razors, clothing or athletic equipment.    Take all antibiotics your doctor prescribed. Don t take half doses or stop the antibiotics, even if you feel better.  Caregivers:    Wash your hands well with soap and warm water before and after any contact with the patient. You can use an alcohol-based hand gel if your hands aren t visibly dirty.    Wear disposable gloves when changing a bandage, touching an infected wound or handling dirty laundry. Throw away the gloves after each use. Then wash your hands well.    Change the patient s bedding once a week, or more often if it s soiled with feces or body fluids. Wash and dry it alone in a washer and dryer using the warmest temperatures recommended on the labels. Use liquid  bleach during the wash cycle if the label permits.    Clean surfaces like tabletops and sinks really well. Keep bathrooms, toilets and bedside commodes clean. A mixture of 1/4 cup of bleach to 1 quart of water works great for this.  Understanding drug resistance  Hard-to-kill (resistant) germs, such as VRE, develop when antibiotics are taken longer than needed. They can also develop when antibiotics are taken when they aren't needed, or are not taken exactly as directed. This is because any germs that survive treatment with an antibiotic can multiply and thus create more resistant germs. The more often antibiotics are used, the more chances resistant germs have to develop. This is why your care team may not prescribe antibiotics unless he or she is certain that they are needed.  Date Last Reviewed: 2/1/2017 2000-2017 [employer]. [address] [place] [address]. All rights reserved. This information is not intended as a substitute for professional medical care. Always follow your healthcare professional's instructions.  This information has been modified by your health care provider with permission from the publisher.                Pacemakers    A pacemaker is a small electronic device that keeps your heart beating at the right pace. Inserting the pacemaker into your body is called implantation. You stay awake during the procedure. You may be asked some questions or be asked to take some deep breaths.  How do I get ready for a pacemaker procedure?    Don t eat or drink anything after midnight the night before the procedure, or 8 hours before the procedure.    Tell your doctor about any allergies to medicines, shellfish or iodine contrast, tape or adhesives, or antibiotic types of soaps. Alternatives can be provided and precautions can be taken to avoid exposing you to anything that you are allergic to.    Follow your healthcare provider's instructions on what medicines to take. If you are  taking a blood thinning medicines, your healthcare provider may give you instructions on stopping it before the procedure to decrease the risk of major bleeding. You may be instructed to stop medicines that interact with the contrast dye that is used to place the pacemaker wires in the vein.    You may be asked to shower with antibacterial soap the night before your procedure and the morning of the procedure. Ask your provider if he or she wants you to use a certain kind of soap.    Your provider may ask you to use clean bed sheets and pajamas the night before the procedure to reduce the risk of infection.    You may be given an antibiotic through an IV to also protect you from infection after the pacemaker implant procedure.    You will have blood drawn depending on your overall health. If your kidney function is not normal, special precautions may be needed before the procedure.    If you are a woman of child-bearing age you may be asked to take a pregnancy test before the procedure.  What happens during the procedure?    Your doctor may prescribe a medicine to help you relax and to prevent pain during the procedure.    A local anesthetic is given by injection to numb the area where the pacemaker will be inserted. This keeps you from feeling pain during the procedure.    The doctor will make a cut (incision) where the generator is placed.    The doctor will guide the wire (lead) through a vein into your heart s chambers using X-ray monitors.    The doctor will attach the pacemaker generator to the lead or leads.    The doctor will close the incision site with stitches and may seal the site with a surgical glue to prevent infection.    A dressing may be applied to your incision to reduce the risk of bleeding and also protect it from infection.    The pacemaker s settings are programmed to help your heart beat at a rate that s right for you.  What happens after the procedure?    You will have a chest X-ray while you  are in the recovery area.    Your pacemaker settings will be rechecked.    Your provider may prescribe antibiotics to take after the procedure to prevent infection.    Follow the instructions you are given for caring for the implantation site. You will likely be told not to raise the arm on that side for a certain amount of time. You may have a sling or arm immobilizer put on to keep you from moving your arm on the incision side. This is done to prevent the new pacemaker wired from becoming displaced. Your doctor will give you instructions on how long and when you should wear this.    Take your temperature and check your incision for signs of infection every day for a week.    Return for a follow-up visit as advised.    As your healthcare provider when it will be safe to shower, bathe, or swim. Generally, you should not soak in water for about a week to prevent the incision from softening, opening, or becoming infected.    Avoid all activities that would put pressure on your incision site or cause irritation to the incision. Do not use lotions, powders, or ointments unless your provider says it is safe to do so. Do not carry a purse or backpack that would put pressure on your incision site.  Call 911  Call 911 if you have:    Chest pain    Severe trouble breathing  When to call your healthcare provider  Call your healthcare provider right away if you have any of the following:    You feel any of the symptoms you had before the pacemaker was implanted. These include dizziness, lightheadedness, lack of energy, or fainting spells.    Your chest or abdominal muscles twitch    You have hiccups that won't stop    You have a rapid or pounding heartbeat or shortness of breath    You feel pain in the area around your pacemaker    You have a fever of 100.4 F (38 C) or higher, or as directed by your healthcare provider    Redness, severe swelling, drainage, bleeding, or warmth at the incision site    Your incision site is not  healing or the incision separates or opens    Your pacemaker generator feels loose or like it is wiggling in the pocket under the skin    If you need an MRI for any reason. In some cases, it is not safe to have an MRI with a pacemaker.   Date Last Reviewed: 3/30/2016    4096-5830 [employer]. [address] [place] [address], [place] [address]. All rights reserved. This information is not intended as a substitute for professional medical care. Always follow your healthcare professional's instructions.                Living with a Pacemaker  When you have a pacemaker, you can do almost everything you did before your surgery. Here are tips for living well with a pacemaker.    Carry an ID card  When you first get your pacemaker, you ll be given an ID card to carry. This card contains important information about the device. Show it to any doctor, dentist, or other provider you visit. Pacemakers may set off metal detectors. So you may need to show your card to security personnel, such as those in the airport security checkpoint.  What to avoid    Be careful when using a cell phone. Hold it to the ear farthest from your pacemaker, or use a headset. Don t carry the phone in your breast pocket, over the pacemaker, even when it s turned off.    Avoid very strong magnets. These include those used for an MRI or in hand-held security wands. Some pacemaker devices are considered safer for having an MRI (MR-conditional devices) but safety precautions must still be used. Show your ID card when you go through security.    Avoid strong electrical fields. These are made by radio transmitting towers and ham radios. They are also made by heavy-duty electrical equipment. A running engine makes an electrical field. Don't lean over the open mathis of a running car. Most household and yard appliances will not cause any problems. If you use any large power tools, such as an industrial , talk to your doctor.     Call your doctor  if you have any symptoms. These include dizziness or palpitations.  What s OK  Below are some of the many things that are safe to use:    Microwave ovens    Computers    Hair dryers    Power tools    Radios, televisions, and CD players    Electric blankets and heating pads    Vacuum     Cars  Follow up  Plan to have periodic checkps with your healthcare provider to evaluate the battery life of your pacemaker. Depending on your device and how much your body uses the pacing functions of the pacemaker, you will need a new pacemaker generator implanted at some point, usually about every 5 to 7 years. On average, this monitoring should happen every 6 months, or as advised by your healthcare provider.  For some devices, the monitoring of the device function and battery-life can be done with a remote monitor that can be set up in your home. Remote monitoring systems use the internet or telephone to communicate the information from your device to your healthcare provider.  Date Last Reviewed: 5/1/2016 2000-2017 [employer]. [address] [place] [address]. All rights reserved. This information is not intended as a substitute for professional medical care. Always follow your healthcare professional's instructions.                Discharge Instructions for Pacemaker Implantation  You have had a procedure to insert a pacemaker. Once inside your body, this small electronic device helps keep your heart from beating too slowly. A pacemaker can't fix existing heart problems. But it can help you feel better and have more energy. As you recover, follow all of the instructions you are given, including those below.  Activity    Don't drive until your doctor says it's OK. Plan to have someone drive you home after the procedure.    Follow the instructions you are given about limiting your activity.    If you are fitted with an arm sling, keep your arm in the sling for as long as your doctor tells you  to. Most often, the sling will be removed the following day though you may be instructed to sleep with it on for a period to prevent damage to the pacemaker while it's healing.    Don't raise your arm on the incision side above shoulder level or stretch your arm behind your back for as long as directed by your doctor. This gives the leads a chance to secure themselves inside your heart.    Ask your doctor when you can expect to return to work. Depending on the type of work you do, you may have restrictions until your cardiologist clears you for unrestricted activity.    You can still exercise. It's good for your body and your heart. Talk with your doctor about an exercise plan and the types of exercise to minimize the risk of damaging your pacemaker.  Other precautions    Follow your doctor's directions carefully for wound care. If there is a dressing, ask whether you should remove it or keep it on until your next visit. Never put any creams, lotions, or products like peroxide on an incision unless your doctor tells you to. Do not get the incision wet until your doctor says it's OK.    Every day, take your temperature and check your incision for signs of infection (redness, swelling, drainage, or warmth). Do this for 7 days or as advised by your doctor.    Learn to take your own pulse. Keep a record of your results. Ask your doctor what pulse rate means you should call for medical attention.    Before you receive any treatment, tell all healthcare providers (including your dentist) that you have a pacemaker.    You will be given an ID card that contains information about your pacemaker. Always carry this card with you. You can show this card if your pacemaker sets off a metal detector. You should also show it to avoid screening with a hand-held security wand.    Keep your cell phone away from your pacemaker. Don't carry the phone in your shirt pocket overlying the pacemaker, even when it's turned off.    Stay away  "from strong magnets. Examples are those used in MRIs or in hand-held security wands. Some pacemakers are now safe to use with MRI scanners. Ask your doctor if you have such a pacemaker.    Stay way from strong electrical fields. Examples are those made by radio transmitting towers, \"ham\" radios, and heavy-duty electrical equipment.    Don't lean over the open mathis of a running car. A running engine creates an electrical field. Most household and yard appliances will not cause any problems. If you use any large power tools, such as an industrial , talk with your doctor.  Follow-up care    See your cardiologist in the next 7 to 10 days. Call and make an appointment as soon as you get home.    Make regular follow-up appointments with your doctor. He or she will check the pacemaker to make sure it's working properly.    Plan on having periodic checkups with your healthcare provider to evaluate the battery life of your pacemaker. Depending on your device and how much your body uses the pacing functions of the pacemaker, you will need a new device generator implanted at some point, generally about every 5 to 7 years.    Some pacemakers have a built-in antenna that can transmit information such as trouble alerts over the internet to your doctor. Ask your doctor if your pacemaker is capable of remote monitoring.     Call 911  Call 911 if you have:    Chest pain    Severe trouble breathing  When to call your healthcare provider  Call your healthcare provider right away if you have any of these:    Dizziness    Lack of energy    Fainting spells    Twitching chest muscles    Rapid pulse or pounding heartbeat    Shortness of breath    Pain around your pacemaker    Fever above 100.4  F (38  C) or higher, or as directed by your healthcare provider    Other signs of infection such as redness, swelling, drainage, or warmth at the incision site    Your incision is not healing or your incision separates or opens    Hiccups " that won't stop    Redness, severe swelling, drainage, worsening pain, bleeding, or warmth at the incision site    If your pacemaker generator feels loose or like it is wiggling in the pocket under the skin   Date Last Reviewed: 6/1/2016 2000-2017 [employer]. [address] [place] [address]. All rights reserved. This information is not intended as a substitute for professional medical care. Always follow your healthcare professional's instructions.              Goals for Your Diabetes Care  A1c   Goal:  Less than 7 percent--ask your doctor if this is right for you  Check it: Every 3 to 6 months  Why:  Shows how well your blood glucose was controlled over the past 3 months  Blood pressure   Goal: Under 140/90  Check it:  At every clinic check up for diabetes  Why:  May prevent stroke, heart disease and eye and kidney diseases  Cholesterol   Goal:  Discuss cholesterol management with your doctor, and consider taking a statin medicine  Check it:  Every year  Why:  Helps prevent heart attacks and stroke  Kidney test (urine microalbumin)  Goal:  Under 30  Do it:  Every year  Why:  Finds kidney problems before they get worse  Foot exam   Goal:  No cuts or sores, normal sensation  Do it: Remove shoes and socks at every visit; have a monofilament test every year  Why: Finds foot problems before they get worse  Eye exam   Goal:  No changes in your eyes  Do it: Every year  Why:  Finds eye problems before they get worse  Exercise  Goal:  150 minutes of aerobic exercises and 2 to 3 sessions of strength training  Do it: Every week  Why:  Helps manage diabetes and improve health  Aspirin  Goal:  If you are age 50 or older, ask your doctor if you should take aspirin  Take it: Every day, or as prescribed  Why: Lowers the risk of heart attack and stroke  Smoking and tobacco  Goal: No tobacco use  Why: Tobacco is a major risk for heart disease  Diabetes education  Goal: Learn how to manage your  diabetes  Do it: At least once a year--ask your doctor for a referral  Why: The more you know, the better you can manage your diabetes  Your goals may be different from what you see here. Your care team will tell you what your goals should be.   For informational purposes only. Not to replace the advice of your health care provider.   Copyright   2009 [facility]. All rights reserved. [employer] 250414 - Rev 01/16.          Know the Difference Between High and Low Glucose  High blood glucose (hyperglycemia)  Symptoms    Extreme thirst    Urinating more often    Headache    Hunger    Blurred vision    Feeling drowsy or tired    Slow healing after illness or injury    Frequent infections  Possible causes    Too much food    Wrong type or amount of diabetes medicine or insulin    Stress or illness    Some medicines  What to do    Test your blood glucose regularly.    If you take insulin: take correction insulin.    Check ketones, if your blood glucose is over 240.    Call the doctor if your glucose level is often above your goal. The doctor may need to change your insulin or diabetes medicine.    Call your care team if you are ill.  Low blood glucose (hypoglycemia)   Symptoms    Shaking, sweating, fast heartbeat    Feeling dizzy, tired or weak    Feeling anxious and irritable    Feeling nervous, crabby or confused    Hunger    Vision problems, headache    Numb or tingling mouth  Possible causes    Not enough carbohydrate    Too much insulin or diabetes medicine    More activity than normal    Stress, alcohol, some medicines  What to do    Test your blood glucose:  ? If under 70, have 1/2 cup juice or 3 to 4 glucose tablets.  ? If under 50, have 1 cup juice or 6 to 8 glucose tablets.    Repeat test every 15 minutes until blood glucose is between 70 and 100. Call 911 if it does not get better.    Call your care team if you have low blood glucose two or more times per week.    For informational purposes only.  Not to replace the advice of your health care provider.   Copyright   2006 [facility]. All rights reserved. [employer] [id number] - REV 08/16.          Living Healthy with Diabetes  Healthful eating  Healthful eating means eating well-balanced meals and snacks at regular times.    Eat a variety of healthy foods to help control blood glucose (blood sugar).    Space your meals during the day. Try to eat a meal every 4 to 5 hours. Include a variety of foods from all food groups.    If you wish, you may have one or two small snacks between meals. Snacks should be nutritious and lower in calories than a meal. (For example: fat-free yogurt, 1/2 cup fruit, 3 cups popcorn, 1/4 cup nuts). Ask your dietitian about healthful snacks.    Do not skip meals.  There are no foods that you cannot eat. But it is important to know which foods most affect your blood glucose.  Three main nutrients give the body energy (calories):    Carbohydrate    Protein    Fat    Foods with carbohydrate will raise blood glucose. These foods include breads, pasta, cereals, rice, fruits, milk and yogurt.    Do not avoid carbohydrate. Eating the right amount of carbohydrate at meals and snacks will help to control blood glucose.    Check blood glucose as directed by your health care provider to see how food choices affect it.  Healthy Eating at a Glance  ? Pay attention to portion sizes.  ? Eat a variety of healthy foods every day.  ? Choose foods high in nutrition:    Fruits and vegetables    Beans and legumes    Whole grains    Heart healthy fats    Lean meats and proteins    Foods without added sodium, sugars and fat    Manage your weight  Managing weight is not only about how much you eat, but also about the quality of foods you eat.    Eat smaller portions.    Eat less sugar.    Eat less of the high-fat foods.  ? Eat smaller portions of healthy fats such as nuts, avocado and olives.  ? Limit fried foods, fatty meats (butterfield, sausage, hot  dogs, cold cuts), butter, salad dressings, cream, gravy, chips, bakery items, whole milk, ice cream, pizza, fast food and hard cheeses.    Choose high-fiber foods such as vegetables, fruits and whole-grain breads and cereals.  ? These foods help you to feel more satisfied with your meal or snack. They are full of nutrients.    Eat mindfully  ? Listen to your body's signals for hunger. Eat when you feel hungry and stop when you start to feel full.  ? Avoid eating when bored, sad or upset.  Physical activity  Activity can help control your glucose levels. The American Diabetes Association recommends 150 minutes of moderate physical activity (walking, biking, swimming) a week or 75 minutes of vigorous activity (jogging, aerobics) per week. Add to that strength training (lifting weights, resistance bands) two or three days a week.  Talk to your doctor before starting an activity program. This is very important if:    You are over age 35.    You have had type 1 diabetes for more than 15 years.    You have had type 2 diabetes for more than 10 years.    You have any risk factors for heart or artery disease (such as high blood pressure, high cholesterol or being overweight).    You have a history of heart or artery disease.    You have any kind of nerve damage (neuropathy).    You have eye disease (retinopathy).  For informational purposes only. Not to replace the advice of your health care provider.  Copyright   2007 [facility] [employer] [facility]. All rights reserved. [employer] [id number] - REV 03/16.          Resources for People with Diabetes  Diabetes websites and resources  For more information about support groups and other resources in your area, contact your diabetes educator or national diabetes groups, such as the American Diabetes Association.  General diabetes  American Diabetes Association   1-748.972.9178; www.diabetes.org  (general information on diabetes, gestational diabetes or diabetes in children)  National  Center for Chronic Disease Prevention and Health Promotion  1-302.790.7911 (CDC-INFO);   www.cdc.gov/diabetes  Medline Plus  www.nlm.nih.gov/medlineplus/diabetes.htm  National Diabetes Information Clearinghouse  3-546-332-9622; www.diabetes.niddk.nih.gov  National Diabetes Education Program  1-832.728.1508; www.ndep.nih.gov  American Association of Diabetes Educators   www.diabeteseducator.org (find an educator near you)  Diabetes and nutrition  My Food Advisor  http://diabetes.org/food-and-fitness/food/myfoodadvisor.htm  Academy of Nutrition and Dietetics  www.eatright.org  Peek Kids Library  www.nutrition.gov  Choose My Plate, [employer]  www.[place] Universitymyplate.gov  Diabetes and nutrition apps    Calorie Williams    MyFitness Pal    Glucose Dillon    AADE Goal Tracker  Gestational diabetes  March of Dimes  www.[employer]Pay/pnhec/188_1025.asp  National Davin of Diabetes and   Digestive and Kidney Disease  1-183-610-0767;   www.diabetes.niddk.nih.gov/dm/pubs/gestational   Children with diabetes  Juvenile Diabetes Research Foundation International  www.jdrf.org  Children with Diabetes Family Support Network  www.childrenwithdiLekan.comtes.com  My Plate Kids' Place, USDA    www.[place] Universitymyplate.gov/kids  American Diabetes Association  www.diabetes.org/in-my-community/diabetes-camp  (camps for children with diabetes)   Diabetes and cystic fibrosis  Cystic Fibrosis Foundation  www.cff.org  (diabetes manual available)  [place] diabetes education programs  [facility] offers a number of resources to help you manage your diabetes, including diabetes classes and pharmacist support.  For children (from any hospital or clinic)  [facility]  [phone]  For adults  [facility]    [place]: [phone]    [place]: [phone]  [facility]  [phone]  [facility]   [phone]  [facility]  [place]  [phone]  [place] pharmacists  Your pharmacist can answer many of your questions about your medicines. To schedule an appointment with a [facility] pharmacist, call [phone] (toll free: [phone]).  For a complete list of [place] pharmacies and contact information, go to [email or web address].  Additional [place] services  [facility] also offers the following services to support your diabetes care.     Behavioral Health    Cardiovascular and Heart Care    Care Coordination    Eye Care    Foot Care    Kidney Care    Neurology    Nutrition Care    Physical Therapy and Diabetes Activity Program    Weight Management  Ask your provider or diabetes educator if you'd like more information.  For informational purposes only. Not to replace the advice of your health care provider.   Copyright   2007 [place] [employer]. All rights reserved. [employer] [id number] - REV 08/16.          Sample Meal Plan for Diabetes  Breakfast (4 carb choices, 60 grams carbohydrate)  Coffee, tea or water  1 cup (8 ounces) skim or 1% milk (1-carb choice)  1 small piece of fresh fruit or 1 cup berries (1-carb choice)  Any one of the following (2-carb choice):    1 slice toast with 1 tablespoon of margarine or peanut butter and   cup of cereal with skim or 1% milk    1 cup cereal with skim or 1% milk    1 egg and 2 slices toast with 1 tablespoon margarine or peanut butter  Lunch (4 carb choices, 60 grams carbohydrate)  Coffee, tea or water  1 cup (8 ounces) skim or 1% milk (1-carb choice)  Small piece fresh fruit or 1 cup berries (1-carb choice)  Raw vegetables (add to sandwich or serve on the side)  Any one of the following (2-carb choice):     [place] (made with 2 slices whole-grain bread)      sandwich with 1 cup soup    2 corn tortillas with meat and vegetables  Dinner (4 carb choices, 60 grams carbohydrate)  Coffee, tea or water  Breast of chicken or pork chop (3 to 4 ounces)  Cooked  vegetable  Tossed salad with small amount of low-fat dressing  1 cup (8 ounces) skim or 1% milk (1-carb choice)  1 small piece fruit or   cup canned fruit, packed in juice or light syrup (1-carb choice)  Any one of the following (2-carb choice):    Medium baked potato    1 cup mashed potato    2/3 cup rice or pasta  Snacks (1 or 2 carb choices, 15 to 30 grams carbohydrate)  Any one or two of the following:    Small piece fresh fruit    3 vesna cracker squares    1 cup raw vegetables with low-fat dip    1 ounce (12 to 15) baked chips with salsa    Light yogurt (100 calories)    1 cup (8 ounces) skim or 1% milk    3 cups popped popcorn    6 vanilla wafers  For informational purposes only. Not to replace the advice of your health care provider.   Copyright   2007 [facility]. All rights reserved. [employer] [id number] - REV 04/16.          What Is Diabetes?  If you have diabetes, your body does not make enough insulin (a hormone), or the insulin it makes does not work the way it should. Diabetes is a lifelong disease.  Glucose (sugar) is your body's main source of energy. Insulin carries glucose from the bloodstream into your body's cells. But if you have diabetes, glucose builds up in your blood. This is called high blood glucose (high blood sugar).  High blood glucose can lead to damage in many parts of your body, including your eyes, kidneys, heart, blood vessels, nerves and skin.  What are the symptoms of diabetes?  Symptoms include:    Extreme thirst    Needing to urinate more often    Headache    Hunger    Blurred vision    Feeling drowsy or tired    Slow healing after an illness or injury    Frequent infections.  What should I do if I have diabetes?  Your first step is to learn how to manage your diabetes. The goal is to keep your blood glucose as close to normal as possible. (A normal level is 70 to 100.) By doing this, you can prevent or control damage to your body.  To manage your diabetes, you will  need to:    Eat a wide range of healthy foods.    Manage your weight.    Be physically active.    Test your blood glucose as prescribed.    Control your blood pressure and cholesterol.    Take your medicines as prescribed.  Are there different kinds of diabetes?  There are two basic kinds of diabetes: type 1 and type 2.  Type 1 diabetes  Type 1 diabetes occurs when the cells that make insulin are destroyed by your body's immune system. Your body can no longer make insulin on its own. People who have type 1 diabetes must take insulin to manage it.  Type 2 diabetes  Type 2 diabetes occurs when the cells of your body cannot use insulin or glucose normally. Over time, your body cannot make enough insulin to meet your body's needs. This is the most common kind of diabetes.  You are more likely to develop type 2 diabetes if you:    Are overweight    Have high blood pressure    Have high cholesterol    Are not physically active    Have a family history of type 2 diabetes    Are , /, ,  American or     Are a woman who has given birth to a baby weighing over 9 pounds, or you have had gestational diabetes (diabetes that occurs in pregnancy).  For informational purposes only. Not to replace the advice of your health care provider.  Copyright   2006 [facility]. All rights reserved. [employer] [id number] - REV 12/15.

## 2018-04-25 NOTE — IP AVS SNAPSHOT
[facility]    [address]    [place] [address]    Phone:  [phone]                                       After Visit Summary   4/25/2018    Sheila Johnson    MRN: [id number]           After Visit Summary Signature Page     I have received my discharge instructions, and my questions have been answered. I have discussed any challenges I see with this plan with the nurse or doctor.    ..........................................................................................................................................  Patient/Patient Representative Signature      ..........................................................................................................................................  Patient Representative Print Name and Relationship to Patient    ..................................................               ................................................  Date                                            Time    ..........................................................................................................................................  Reviewed by Signature/Title    ...................................................              ..............................................  Date                                                            Time

## 2018-04-25 NOTE — LETTER
Transition Communication Hand-off for Care Transitions to Next Level of Care Provider    Name: Sheila Johnson  : 1924  MRN #: [id number]  Primary Care Provider: Thania Cadena  Primary Care MD Name: Dr. Cadena  Primary Clinic: [facility] [address]  [facility] [address]     Reason for Hospitalization:  Aphasia [R47.01]  Transient cerebral ischemia, unspecified type [G45.9]  Admit Date/Time: 2018  6:05 PM  Discharge Date: 2018  Payor Source: Payor: MEDICA / Plan: MEDICA PRIME SOLUTION / Product Type: Indemnity /     Readmission Assessment Measure (MARY) Risk Score/category: Average    Plan of Care Goals/Milestone Events:          Reason for Communication Hand-off Referral: Multiple providers/specialties    Discharge Plan:       Concern for non-adherence with plan of care:   Y/N yes  Discharge Needs Assessment:  Needs       Most Recent Value    Equipment Currently Used at Home walker, rolling, cane, straight, grab bar, shower chair    Transportation Available family or friend will provide    # of Referrals Placed by [facility], Scheduled Follow-up appointments, Communication hand-offs to next level of Care Providers          Already enrolled in Tele-monitoring program and name of program:  NA  Follow-up specialty is recommended: Yes    Follow-up plan:  Future Appointments  Date Time Provider Department Center   2018 2:00 PM Ericka Choi, SLP [facility]       Any outstanding tests or procedures:        Referrals     Future Labs/Procedures    Follow-Up with Device Clinic     Scheduling Instructions:    Needs pacemaker device follow up appointment scheduled.    Home care nursing referral     Comments:    RN   Your provider has ordered home care nursing services. If you have not been contacted within 2 days of your discharge please call the inpatient department phone number at [phone] .    Home Care OT Referral for Hospital Discharge     Comments:    OT  to eval and treat    Your provider has ordered home care - occupational therapy. If you have not been contacted within 2 days of your discharge please call the department phone number listed on the top of this document.    Home Care PT Referral for Hospital Discharge     Comments:    PT to eval and treat    Your provider has ordered home care - physical therapy. If you have not been contacted within 2 days of your discharge please call the department phone number listed on the top of this document.            Key Recommendations:  Discharging home with [facility]. Faxed handoff to PCP Dr. Cadena fax : [phone]. Had TIA symptoms and new Pacemaker this admission. Has had problems with medication compliance.     Misti Mckinney    AVS/Discharge Summary is the source of truth; this is a helpful guide for improved communication of patient story

## 2018-04-25 NOTE — ED NOTES
Bed: ED26  Expected date: 4/25/18  Expected time: 5:41 PM  Means of arrival: Ambulance  Comments:  [phone] diff speaking  ETA [phone]

## 2018-04-26 ENCOUNTER — APPOINTMENT (OUTPATIENT)
Dept: SPEECH THERAPY | Facility: CLINIC | Age: 83
DRG: 041 | End: 2018-04-26
Attending: INTERNAL MEDICINE
Payer: MEDICARE

## 2018-04-26 ENCOUNTER — APPOINTMENT (OUTPATIENT)
Dept: PHYSICAL THERAPY | Facility: CLINIC | Age: 83
DRG: 041 | End: 2018-04-26
Attending: INTERNAL MEDICINE
Payer: MEDICARE

## 2018-04-26 ENCOUNTER — APPOINTMENT (OUTPATIENT)
Dept: GENERAL RADIOLOGY | Facility: CLINIC | Age: 83
DRG: 041 | End: 2018-04-26
Attending: INTERNAL MEDICINE
Payer: MEDICARE

## 2018-04-26 ENCOUNTER — APPOINTMENT (OUTPATIENT)
Dept: MRI IMAGING | Facility: CLINIC | Age: 83
DRG: 041 | End: 2018-04-26
Attending: INTERNAL MEDICINE
Payer: MEDICARE

## 2018-04-26 ENCOUNTER — APPOINTMENT (OUTPATIENT)
Dept: OCCUPATIONAL THERAPY | Facility: CLINIC | Age: 83
DRG: 041 | End: 2018-04-26
Attending: INTERNAL MEDICINE
Payer: MEDICARE

## 2018-04-26 ENCOUNTER — APPOINTMENT (OUTPATIENT)
Dept: CARDIOLOGY | Facility: CLINIC | Age: 83
DRG: 041 | End: 2018-04-26
Attending: INTERNAL MEDICINE
Payer: MEDICARE

## 2018-04-26 LAB
ALBUMIN SERPL-MCNC: 2.5 G/DL (ref 3.4–5)
ALP SERPL-CCNC: 59 U/L (ref 40–150)
ALT SERPL W P-5'-P-CCNC: 12 U/L (ref 0–50)
ANION GAP SERPL CALCULATED.3IONS-SCNC: 7 MMOL/L (ref 3–14)
AST SERPL W P-5'-P-CCNC: 13 U/L (ref 0–45)
BILIRUB DIRECT SERPL-MCNC: 0.1 MG/DL (ref 0–0.2)
BILIRUB SERPL-MCNC: 0.5 MG/DL (ref 0.2–1.3)
BUN SERPL-MCNC: 14 MG/DL (ref 7–30)
CALCIUM SERPL-MCNC: 8.1 MG/DL (ref 8.5–10.1)
CHLORIDE SERPL-SCNC: 102 MMOL/L (ref 94–109)
CHOLEST SERPL-MCNC: 144 MG/DL
CO2 SERPL-SCNC: 26 MMOL/L (ref 20–32)
CREAT SERPL-MCNC: 0.91 MG/DL (ref 0.52–1.04)
ERYTHROCYTE [DISTWIDTH] IN BLOOD BY AUTOMATED COUNT: 13.2 % (ref 10–15)
GFR SERPL CREATININE-BSD FRML MDRD: 57 ML/MIN/1.7M2
GLUCOSE BLDC GLUCOMTR-MCNC: 190 MG/DL (ref 70–99)
GLUCOSE BLDC GLUCOMTR-MCNC: 208 MG/DL (ref 70–99)
GLUCOSE BLDC GLUCOMTR-MCNC: 240 MG/DL (ref 70–99)
GLUCOSE BLDC GLUCOMTR-MCNC: 268 MG/DL (ref 70–99)
GLUCOSE BLDC GLUCOMTR-MCNC: 282 MG/DL (ref 70–99)
GLUCOSE BLDC GLUCOMTR-MCNC: 328 MG/DL (ref 70–99)
GLUCOSE SERPL-MCNC: 196 MG/DL (ref 70–99)
HCT VFR BLD AUTO: 36.5 % (ref 35–47)
HDLC SERPL-MCNC: 50 MG/DL
HGB BLD-MCNC: 11.9 G/DL (ref 11.7–15.7)
LDLC SERPL CALC-MCNC: 71 MG/DL
MCH RBC QN AUTO: 28.7 PG (ref 26.5–33)
MCHC RBC AUTO-ENTMCNC: 32.6 G/DL (ref 31.5–36.5)
MCV RBC AUTO: 88 FL (ref 78–100)
NONHDLC SERPL-MCNC: 94 MG/DL
NT-PROBNP SERPL-MCNC: 1264 PG/ML (ref 0–1800)
PLATELET # BLD AUTO: 282 10E9/L (ref 150–450)
POTASSIUM SERPL-SCNC: 3.7 MMOL/L (ref 3.4–5.3)
PROT SERPL-MCNC: 6.4 G/DL (ref 6.8–8.8)
RBC # BLD AUTO: 4.15 10E12/L (ref 3.8–5.2)
SODIUM SERPL-SCNC: 135 MMOL/L (ref 133–144)
TRIGL SERPL-MCNC: 114 MG/DL
TROPONIN I SERPL-MCNC: 0.04 UG/L (ref 0–0.04)
TROPONIN I SERPL-MCNC: 0.04 UG/L (ref 0–0.04)
TSH SERPL DL<=0.005 MIU/L-ACNC: 1.62 MU/L (ref 0.4–4)
WBC # BLD AUTO: 10.1 10E9/L (ref 4–11)

## 2018-04-26 PROCEDURE — A9270 NON-COVERED ITEM OR SERVICE: HCPCS | Mod: GY | Performed by: PSYCHIATRY & NEUROLOGY

## 2018-04-26 PROCEDURE — 93010 ELECTROCARDIOGRAM REPORT: CPT | Performed by: INTERNAL MEDICINE

## 2018-04-26 PROCEDURE — 25000132 ZZH RX MED GY IP 250 OP 250 PS 637: Mod: GY | Performed by: INTERNAL MEDICINE

## 2018-04-26 PROCEDURE — 40000225 ZZH STATISTIC SLP WARD VISIT

## 2018-04-26 PROCEDURE — 97161 PT EVAL LOW COMPLEX 20 MIN: CPT | Mod: GP | Performed by: PHYSICAL THERAPIST

## 2018-04-26 PROCEDURE — 25000132 ZZH RX MED GY IP 250 OP 250 PS 637: Mod: GY | Performed by: PSYCHIATRY & NEUROLOGY

## 2018-04-26 PROCEDURE — 25500064 ZZH RX 255 OP 636: Performed by: INTERNAL MEDICINE

## 2018-04-26 PROCEDURE — A9270 NON-COVERED ITEM OR SERVICE: HCPCS | Mod: GY | Performed by: INTERNAL MEDICINE

## 2018-04-26 PROCEDURE — 99233 SBSQ HOSP IP/OBS HIGH 50: CPT | Performed by: INTERNAL MEDICINE

## 2018-04-26 PROCEDURE — 80061 LIPID PANEL: CPT | Performed by: INTERNAL MEDICINE

## 2018-04-26 PROCEDURE — 71045 X-RAY EXAM CHEST 1 VIEW: CPT

## 2018-04-26 PROCEDURE — 95813 EEG EXTND MNTR 61-119 MIN: CPT

## 2018-04-26 PROCEDURE — 99222 1ST HOSP IP/OBS MODERATE 55: CPT | Performed by: INTERNAL MEDICINE

## 2018-04-26 PROCEDURE — 36415 COLL VENOUS BLD VENIPUNCTURE: CPT | Performed by: INTERNAL MEDICINE

## 2018-04-26 PROCEDURE — 40000133 ZZH STATISTIC OT WARD VISIT

## 2018-04-26 PROCEDURE — 97166 OT EVAL MOD COMPLEX 45 MIN: CPT | Mod: GO

## 2018-04-26 PROCEDURE — 00000146 ZZHCL STATISTIC GLUCOSE BY METER IP

## 2018-04-26 PROCEDURE — 70553 MRI BRAIN STEM W/O & W/DYE: CPT

## 2018-04-26 PROCEDURE — 25000128 H RX IP 250 OP 636: Performed by: INTERNAL MEDICINE

## 2018-04-26 PROCEDURE — 83880 ASSAY OF NATRIURETIC PEPTIDE: CPT | Performed by: INTERNAL MEDICINE

## 2018-04-26 PROCEDURE — 92610 EVALUATE SWALLOWING FUNCTION: CPT | Mod: GN

## 2018-04-26 PROCEDURE — 84443 ASSAY THYROID STIM HORMONE: CPT | Performed by: INTERNAL MEDICINE

## 2018-04-26 PROCEDURE — 25000131 ZZH RX MED GY IP 250 OP 636 PS 637: Mod: GY | Performed by: INTERNAL MEDICINE

## 2018-04-26 PROCEDURE — 84484 ASSAY OF TROPONIN QUANT: CPT | Performed by: INTERNAL MEDICINE

## 2018-04-26 PROCEDURE — 12000000 ZZH R&B MED SURG/OB

## 2018-04-26 PROCEDURE — 80076 HEPATIC FUNCTION PANEL: CPT | Performed by: INTERNAL MEDICINE

## 2018-04-26 PROCEDURE — 99222 1ST HOSP IP/OBS MODERATE 55: CPT | Performed by: PSYCHIATRY & NEUROLOGY

## 2018-04-26 PROCEDURE — 97535 SELF CARE MNGMENT TRAINING: CPT | Mod: GO

## 2018-04-26 PROCEDURE — A9585 GADOBUTROL INJECTION: HCPCS | Performed by: INTERNAL MEDICINE

## 2018-04-26 PROCEDURE — 25000125 ZZHC RX 250: Performed by: INTERNAL MEDICINE

## 2018-04-26 PROCEDURE — 97116 GAIT TRAINING THERAPY: CPT | Mod: GP | Performed by: PHYSICAL THERAPIST

## 2018-04-26 PROCEDURE — 97530 THERAPEUTIC ACTIVITIES: CPT | Mod: GP | Performed by: PHYSICAL THERAPIST

## 2018-04-26 PROCEDURE — 93005 ELECTROCARDIOGRAM TRACING: CPT

## 2018-04-26 PROCEDURE — 92526 ORAL FUNCTION THERAPY: CPT | Mod: GN

## 2018-04-26 PROCEDURE — 93306 TTE W/DOPPLER COMPLETE: CPT | Mod: 26 | Performed by: INTERNAL MEDICINE

## 2018-04-26 PROCEDURE — 40000193 ZZH STATISTIC PT WARD VISIT: Performed by: PHYSICAL THERAPIST

## 2018-04-26 PROCEDURE — 80048 BASIC METABOLIC PNL TOTAL CA: CPT | Performed by: INTERNAL MEDICINE

## 2018-04-26 PROCEDURE — 85027 COMPLETE CBC AUTOMATED: CPT | Performed by: INTERNAL MEDICINE

## 2018-04-26 PROCEDURE — 25000131 ZZH RX MED GY IP 250 OP 636 PS 637: Mod: GY

## 2018-04-26 RX ORDER — GADOBUTROL 604.72 MG/ML
10 INJECTION INTRAVENOUS ONCE
Status: COMPLETED | OUTPATIENT
Start: 2018-04-26 | End: 2018-04-26

## 2018-04-26 RX ORDER — FUROSEMIDE 10 MG/ML
40 INJECTION INTRAMUSCULAR; INTRAVENOUS ONCE
Status: COMPLETED | OUTPATIENT
Start: 2018-04-26 | End: 2018-04-26

## 2018-04-26 RX ORDER — FLUTICASONE PROPIONATE 50 MCG
2 SPRAY, SUSPENSION (ML) NASAL DAILY PRN
Status: DISCONTINUED | OUTPATIENT
Start: 2018-04-26 | End: 2018-04-28 | Stop reason: HOSPADM

## 2018-04-26 RX ORDER — CLOPIDOGREL BISULFATE 75 MG/1
75 TABLET ORAL DAILY
Status: DISCONTINUED | OUTPATIENT
Start: 2018-04-26 | End: 2018-04-28 | Stop reason: HOSPADM

## 2018-04-26 RX ORDER — MONTELUKAST SODIUM 10 MG/1
10 TABLET ORAL AT BEDTIME
Status: DISCONTINUED | OUTPATIENT
Start: 2018-04-26 | End: 2018-04-28 | Stop reason: HOSPADM

## 2018-04-26 RX ADMIN — INSULIN ASPART 3 UNITS: 100 INJECTION, SOLUTION INTRAVENOUS; SUBCUTANEOUS at 18:42

## 2018-04-26 RX ADMIN — ATORVASTATIN CALCIUM 40 MG: 40 TABLET, FILM COATED ORAL at 10:00

## 2018-04-26 RX ADMIN — SODIUM CHLORIDE: 9 INJECTION, SOLUTION INTRAVENOUS at 05:10

## 2018-04-26 RX ADMIN — CEFAZOLIN SODIUM 2 G: 2 INJECTION, SOLUTION INTRAVENOUS at 15:18

## 2018-04-26 RX ADMIN — INSULIN ASPART 4 UNITS: 100 INJECTION, SOLUTION INTRAVENOUS; SUBCUTANEOUS at 22:49

## 2018-04-26 RX ADMIN — INSULIN GLARGINE 20 UNITS: 100 INJECTION, SOLUTION SUBCUTANEOUS at 23:00

## 2018-04-26 RX ADMIN — INSULIN ASPART 2 UNITS: 100 INJECTION, SOLUTION INTRAVENOUS; SUBCUTANEOUS at 02:15

## 2018-04-26 RX ADMIN — CLOPIDOGREL 75 MG: 75 TABLET, FILM COATED ORAL at 10:00

## 2018-04-26 RX ADMIN — FAMOTIDINE 20 MG: 10 INJECTION, SOLUTION INTRAVENOUS at 22:48

## 2018-04-26 RX ADMIN — CEFAZOLIN SODIUM 2 G: 2 INJECTION, SOLUTION INTRAVENOUS at 05:11

## 2018-04-26 RX ADMIN — FUROSEMIDE 40 MG: 10 INJECTION, SOLUTION INTRAMUSCULAR; INTRAVENOUS at 16:20

## 2018-04-26 RX ADMIN — GADOBUTROL 10 ML: 604.72 INJECTION INTRAVENOUS at 11:41

## 2018-04-26 RX ADMIN — CEFAZOLIN SODIUM 2 G: 2 INJECTION, SOLUTION INTRAVENOUS at 22:49

## 2018-04-26 RX ADMIN — HUMAN ALBUMIN MICROSPHERES AND PERFLUTREN 9 ML: 10; .22 INJECTION, SOLUTION INTRAVENOUS at 14:21

## 2018-04-26 RX ADMIN — INSULIN ASPART 2 UNITS: 100 INJECTION, SOLUTION INTRAVENOUS; SUBCUTANEOUS at 06:22

## 2018-04-26 RX ADMIN — INSULIN ASPART 3 UNITS: 100 INJECTION, SOLUTION INTRAVENOUS; SUBCUTANEOUS at 12:10

## 2018-04-26 RX ADMIN — INSULIN ASPART 3 UNITS: 100 INJECTION, SOLUTION INTRAVENOUS; SUBCUTANEOUS at 15:24

## 2018-04-26 RX ADMIN — MONTELUKAST SODIUM 10 MG: 10 TABLET, FILM COATED ORAL at 22:48

## 2018-04-26 ASSESSMENT — ACTIVITIES OF DAILY LIVING (ADL)
ADLS_ACUITY_SCORE: 19
PREVIOUS_RESPONSIBILITIES: MEAL PREP;LAUNDRY;MEDICATION MANAGEMENT
ADLS_ACUITY_SCORE: 19
ADLS_ACUITY_SCORE: 18

## 2018-04-26 ASSESSMENT — ENCOUNTER SYMPTOMS
CONFUSION: 0
SPEECH DIFFICULTY: 1
HEADACHES: 1
WEAKNESS: 0
NUMBNESS: 0

## 2018-04-26 ASSESSMENT — VISUAL ACUITY
OU: NORMAL ACUITY;GLASSES
OU: GLASSES;NORMAL ACUITY
OU: NORMAL ACUITY;GLASSES

## 2018-04-26 NOTE — CONSULTS
[facility]      Stroke Code Note    Sheila Johnson is a 89 year old female.    A stroke code was activated due to aphasia.    Stroke Code Data  Time notified 2050   Time of first response 2051   Time tele service began 2115   Time tele service ended 2125   Onset of symptoms 2045   Last known normal 1930   Time head CT read by me 2100   Was stroke code de-escalated? No       Telestroke Service Details  Type of service telemedicine diagnostic assessment of acute neurological changes   Reason telemedicine is appropriate patient required immediate assistance from specialist   Mode of transmission secure interactive audio and video communication per Nilay   Originating site (patient location) [facility]    Distant site (provider location) [facility]       TPA Treatment  Not given due to minor / isolated / quickly resolving stroke symptoms.    Stroke Scales      National Institutes of Health Stroke Scale (at presentation)   NIHSS done at:  time patient seen      Score    Level of consciousness:  (0)   Alert, keenly responsive    LOC questions:  (0)   Answers both questions correctly    LOC commands:  (0)   Performs both tasks correctly    Best gaze:  (0)   Normal    Visual:  (0)   No visual loss    Facial palsy:  (0)   Normal symmetrical movements    Motor arm (left):  (0)   No drift    Motor arm (right):  (0)   No drift    Motor leg (left):  (0)   No drift    Motor leg (right):  (0)   No drift    Limb ataxia:  (0)   Absent    Sensory:  (0)   Normal- no sensory loss    Best language:  (0)   Normal- no aphasia    Dysarthria:  (0)   Normal    Extinction and inattention:  (0)   No abnormality        NIHSS Total Score:  0        Impression:  89-year-old woman presenting to the hospital after having an episode of an inability to speak this morning.  She states that she could not get her words out.  She presented to the emergency room than feeling somewhat resolved, but  not back to her baseline.  She was about to be admitted to the hospital for observation when she was awakened in her room and found to be aphasic.  She remembers being unable to say the words that she wanted to say at that time.  She went for a repeat head CT and a CT perfusion as well as a CT angiogram of the head.  CT angiogram of the head did not reveal any obvious large vessel occlusion that would be amenable to intervention.  CT perfusion did not demonstrate any obvious mismatch.  Upon return to her room in the emergency department her symptoms had entirely resolved.  These stuttering symptoms may represent stuttering TIA in a relatively significant vascular territory.  She reports that she is noncompliant with her medications on a fairly frequent basis.  This is demonstrated by her A1c being elevated up to 9.8 and her glucose being in the 300s upon arrival as well as greater than 1000 on the glucose in the urine. One of the medications that she is noncompliant with is her prescribed Plavix and also likely her statin.    Recommendations  Would recommend loading with 300 mg of Plavix at this time.  This is an effort to possibly shut down any stuttering blood vessel that may be contributing to the fluctuation of symptoms.  She will be admitted to observation where she will have an MRI, echocardiogram, and neurologic evaluation.      I spent 25 minutes of critical care time supervising the patient's code stroke activation.   I personally reviewed all relevant labs and neuroimaging.

## 2018-04-26 NOTE — PLAN OF CARE
Problem: Patient Care Overview  Goal: Plan of Care/Patient Progress Review  OT: Orders received, eval completed, treatment initiated. Pt is a 89yo female admitted on 4/25 for evaluation of aphasia - work-up pending, suspected stuttering TIA vs stroke with rapidly resolving symptoms per neuro. PMH significant for CVA, HTN, HLD, DM II.    Discharge Planner OT   Patient plan for discharge: Home   Current status: Pt required verbal and visual cues to complete tasks, she required VCs to safely manage walker and lines during functional transfers  Barriers to return to prior living situation: Cognition and cues required, assist required for safe functional transfers   Recommendations for discharge: Home with assist as well as HHOT  Rationale for recommendations: Anticipate with continued therapy that pt will progress to be able to safely d/c home, would benefit from assist to complete safe ADL/IADLs, as well as HHOT OT to address cognition and independence with ADL/IADLs in the home. Pt seems to have strong level of support.        Entered by: Tonya Arnold 04/26/2018 11:01 AM          Entered by: Dary Wiley 04/26/2018 9:31 AM

## 2018-04-26 NOTE — PROGRESS NOTES
! Hour with Video completed bedside.  Awake, on 02, no sleep obtained. Bathroom issues, trying to get out of bed, didn't want to use bed pan. EEG# . Ordering Dr. NEGRA Wilson.

## 2018-04-26 NOTE — PROGRESS NOTES
Cross cover note.  Patient noted to have sinus bradycardia with HR in 30s during sleep. Asymptomatic   Reviewed EKG from ED showed heart rate of 60, 1 st degree AV block, bifascicular block  EKG from the floor shows heart rate of 60, sinus rhythm,2 nd degree AV block,  RBBB with bifascicular block    Reviewed EKGs, 2013 show first-degree AV block with bifascicular block  patient asymptomatic at this time, continue telemetry monitoring. Has echocardiogram ordered for tomorrow morning.  Not on AV christine blocking agents. Hold Celexa.  Plan of care discussed with patients nurse.

## 2018-04-26 NOTE — PROVIDER NOTIFICATION
Prescriber Notification Note    The pharmacist has communicated with this patient's provider regarding a concern or therapy recommendation.    Notified Person: Myla  Date/Time of Notification: 4/25/18 3766  Interaction: phone  Concern/Recommendation:discussed pt on levemir at home, not lantus. Continue with lantus. Discussed pt received plavix 300 mg po in the ED. Put plavix 75 mg po daily on hold until able to take po. Start asa  mg po daily tomorrow, and d/c asa once able to take plavix.

## 2018-04-26 NOTE — PROGRESS NOTES
"BRIEF NUTRITION ASSESSMENT      REASON FOR ASSESSMENT:  Admission Screen - new/uncontrolled diabetes    NUTRITION HISTORY:  Visited with Lorna and her two daughters. Pt reports that she tends to eat two larger meals each day, usually at noon and 5pm. She does not like to eat breakfast but will sometimes have a snack around 9am. States that as she's aged she's become more of a \"moderate\" eater, however denies any recent changes in appetite or intake. Lorna states that she used to count CHO, but does not anymore; explicitly states that she is not inclined to do this anymore.     Per EMR, pt with significant family and environmental support regarding meals. Resides in independent senior housing where 15 meals are provided in the dining room each month. Pt shares that she enjoys these meals, for the most part. She also does some light cooking and goes out to eat with family. Her children do her grocery shopping.       CURRENT DIET AND INTAKE:  Diet:  Regular Diet              Just advanced this morning, upon interview pt has ordered scrambled eggs + cheese, toast, milk and fruit    ANTHROPOMETRICS:  Height: 4'9\" (per Care Everywhere/[facility])  Weight: 101.2 kg  BMI: 45.08 kg/m2  IBW:  38.6 kg  Weight Status: Obesity Grade III BMI >40  %IBW: 262%  Weight History: Limited weight history available in [employer] system. Per review of Care Everywhere, pt weighed 103 kg on 4/3/2018 ([facility]). Pt not sure of usual body weight but does not believe she has lost any weight recently.   Wt Readings from Last 10 Encounters:   04/26/18 101.2 kg (223 lb 3.2 oz)   02/19/13 107.1 kg (236 lb 1.8 oz)       LABS:  Labs noted    -325  HgA1c 9.8    MALNUTRITION:  Patient does not meet two of the following criteria necessary for diagnosing malnutrition: significant weight loss, reduced intake, subcutaneous fat loss, muscle loss or fluid retention    NUTRITION INTERVENTION:  Nutrition Diagnosis:  No nutrition " diagnosis at this time.    Implementation:  Nutrition Education:   Reinforced with pt and daughters the importance of consistent meals and adequate protein while hospitalized to maximize healing and control BG. Daughters asking if they can utilize Ensure as breakfast for pt and/or at HS. Recommended Glucerna instead.     FOLLOW UP/MONITORING:   Will re-evaluate in 7 - 10 days, or sooner, if re-consulted.      Jia Mckenzie RD, LD  Clinical Dietitian

## 2018-04-26 NOTE — PLAN OF CARE
Problem: Stroke (Ischemic) (Adult)  Goal: Signs and Symptoms of Listed Potential Problems Will be Absent, Minimized or Managed (Stroke)  Signs and symptoms of listed potential problems will be absent, minimized or managed by discharge/transition of care (reference Stroke (Ischemic) (Adult) CPG).   Outcome: No Change  Patient was improving today until event at 1430 where she digressed in mental status.

## 2018-04-26 NOTE — CONSULTS
[facility]      Neurology Stroke Consult    Patient Name: Sheila Johnson  : 1924 MRN#: [id number]    STROKE DATA    Stroke Code:  Time called:  18  Time patient seen:  18 on tele  Onset of symptoms:  18  Last known normal (pt's baseline):  18  Head CT read by me at:  18       TPA treatment:  Not given due to minor / isolated / quickly resolving stroke symptoms.     National Institutes of Health Stroke Scale (at presentation)  NIHSS done at:  Today, see tele note for initial NIHSS      Score    Level of consciousness:  (0)   Alert, keenly responsive     LOC questions:  (0)   Answers both questions correctly    LOC commands:  (0)   Performs both tasks correctly    Best gaze:  (0)   Normal    Visual:  (0)   No visual loss    Facial palsy:  (0)   Normal symmetrical movements    Motor arm (left):  (0)   No drift    Motor arm (right):  (0)   No drift    Motor leg (left):  (0)   No drift    Motor leg (right):  (0)   No drift    Limb ataxia:  (0)   Absent    Sensory:  (0)   Normal- no sensory loss    Best language:  (0)   Normal- no aphasia    Dysarthria:  (0)   Normal    Extinction and inattention:  (0)   No abnormality        NIHSS Total Score:  0           ASSESSMENT & RECOMMENDATIONS     The patient was seen for aphasia.  The differential diagnosis includes stroke, TIA and seizures.     Impression: 89yo presenting to the hospital after having an episode of aphasia in the morning followed by a witnessed episode while in the emergency room. Suspect that this was either a stroke with rapidly resolving symptoms vs. A stuttering TIA.     Recommendations:  Transient Ischemic Attack Plan  - ABCD2 Score: 5  - Neurochecks  - Euthermia, Euglycemia  - Statin  - MRI/MRA Stroke Protocol  - TTE with Bubble Study  - 24-hour Telemetry  - Bedside Glucose Monitoring  - A1c, Lipid Panel  - PT/OT/SLP  - Stroke Education  - Depression Screen  - Apnea Screen  -  75mg Plavix daily    Prophylaxis          For VTE Prevention:  - ambulation (patient is low-risk and no additional VTE prophylaxis is needed)     The patient will be managed by the hospitalist team and  followed by the Stroke Consult service.    SANA Johnson is a 89 year old female with a history of diabetes, hypertension, hypercholesterolemia, and prior stroke presenting with symptoms concerning for TIA/stroke. Yesterday morning she had an episode where she had difficulty with speech manifesting as word finding difficulties. This lasted about an hour before resolution. She came to the emergency room for evaluation given that this episode was similar to a prior stroke episode. While in the emergency room she had an episode of aphasia upon awaking from sleep. A stroke code was called for this episode. CT/CTA/CTP performed without obvious abnormality. Upon being evaluated by this provider via tele-stroke she had resolved. There were no focal findings that were able to be elicited at that time.      Pertinent Past Medical/Surgical History  Past Medical History:   Diagnosis Date     Arthritis      Depressive disorder      Diabetes mellitus (H)      Hypercholesteraemia      Hypertension        Past Surgical History:   Procedure Laterality Date     ORTHOPEDIC SURGERY       RELEASE CARPAL TUNNEL         Medications:   Prescriptions Prior to Admission   Medication Sig Dispense Refill Last Dose     Acetaminophen (TYLENOL PO) Take 1,000 mg by mouth every morning Has stopped taking since Mon, Feb 11th or Tues, Feb 12th, 2013 when she started taking Vicodin    Past Week at Unknown time     ATORVASTATIN CALCIUM PO Take 40 mg by mouth daily.   Past Week at Unknown time     CITALOPRAM HYDROBROMIDE PO Take 20 mg by mouth daily.   Past Week at Unknown time     clopidogrel (PLAVIX) 75 MG tablet Take 75 mg by mouth daily   Past Week at Unknown time     fluticasone (FLONASE) 50 MCG/ACT spray Spray 2 sprays into both nostrils  daily as needed for rhinitis or allergies   prn med     furosemide (LASIX) 20 MG tablet Take 20 mg by mouth daily   Past Week at Unknown time     insulin detemir (LEVEMIR FLEXPEN/FLEXTOUCH) 100 UNIT/ML injection Inject 44 Units Subcutaneous every morning   Past Week at Unknown time     menthol-zinc oxide (CALMOSEPTINE) 0.44-20.625 % OINT ointment Apply topically 2 times daily as needed for skin protection   prn med     Montelukast Sodium (SINGULAIR PO) Take 10 mg by mouth At Bedtime   Past Week at Unknown time     order for DME [employer]  Phone #[phone]  Fax #[phone]  Ready To Wear Knee High Compression Stockings  20-30 mmHg   May need carlene and liner   Length of Need: Life Time  # of Pairs 3 3 Device 0    .    Allergies: No Known Allergies.    Family History: I have reviewed this patient's family history.    Social History:   Social History   Substance Use Topics     Smoking status: Former Smoker     Smokeless tobacco: Not on file     Alcohol use No       ROS:  The 10 point Review of Systems is negative other than noted in the HPI.    PHYSICAL EXAMINATION  Vital Signs:  B/P: 152/77,  T: 98.3,  P: 74,  R: 18    General:  patient lying in bed without any acute distress    HEENT:  normocephalic/atraumatic  Cardio:  RRR  Pulmonary:  no respiratory distress, desaturations on room air  Abdomen:  soft, non-tender, non-distended  Extremities:  pitting edema present  Skin:  intact     Neurologic  Mental Status:  fully alert, attentive and oriented, follows commands, speech clear and fluent  Cranial Nerves:  visual fields intact, PERRL, EOMI with normal smooth pursuit, facial sensation intact and symmetric, facial movements symmetric, hearing not formally tested but intact to conversation, palate elevation symmetric and uvula midline, no dysarthria, shoulder shrug strong bilaterally, tongue protrusion midline  Motor:  no abnormal movements, normal tone throughout, normal muscle bulk, no  pronator drift, normal and symmetric rapid finger tapping, able to move all limbs spontaneously, strength 5/5 throughout upper and lower extremities  Reflexes:  2+ and symmetric throughout  Sensory:  intact/symmetric to light touch and pin prick throughout upper and lower extremities  Coordination:  FNF and HS intact without dysmetria  Station/Gait:  utilizing walker at baseline    Labs  Labs and Imaging reviewed and used in developing the plan; pertinent results included.     Lab Results   Component Value Date     (H) 04/26/2018       Sheldon Wilson MD  Vascular Neurology/Neurocritical Care  Text Page - [phone]

## 2018-04-26 NOTE — PROGRESS NOTES
Hospitalist aurad, [phone] FYI patient HR terrance'ing down to the 30's, patient asymptomatic. EKG shows she now has a 2nd degree AV block, EKG earlier tonight showed 1st degree AV block.

## 2018-04-26 NOTE — CONSULTS
Care Coordinator consult. Met with daughter, patient sleeping currently. Patient may need home care. Care Transition Initial Assessment - RN        Met with: Patient and Family.  DATA   Active Problems:    Aphasia       Cognitive Status: sleeping.        Contact information and PCP information verified: Yes  Lives With: (P) alone  Living Arrangements: independent living facility                 Insurance concerns: No Insurance issues identified  ASSESSMENT  Patient currently receives the following services:  none        Identified issues/concerns regarding health management: none    PLAN  Financial costs for the patient include NA .  Patient given options and choices for discharge NA .  Patient/family is agreeable to the plan?  Discharge plan to be determined. Probably will need home care RN/PT/OT  Patient anticipates discharging to home lives at [facility] independent living [place] [facility]  She uses walker to ambulate . Eats dinner in dinning room [facility], has a kitchen in her apartment where she prepares her other meals. Daughters and a niece do her grocery and other shopping. Does not currently use any services.         Patient anticipates needs for home equipment: No  Plan/Disposition: to be determined possibly home with home care RN for medication management, PT/OT  Appointments: to be determined   Met with patient she just was helped back to bed after having difficulty while sitting in chair. Now sleeping. Spoke to her daughter to get this information. Daughter states has had problems remembering or taking her medications as prescribed and said she will need some kind of help to do this now. Daughter does not know for sure if [place] has people they use for home care. Would possibly want to use [facility] unless [place] has resources. Will continue to follow. Will send referral to [facility] in case they decide to use [facility]. Gave nibraeden [facility] brochure. Update:  [facility] is also a home care possibility.   Care  (CTS) will continue to follow as needed.

## 2018-04-26 NOTE — PROGRESS NOTES
Pt and her daughters provided with education and handout regarding VRE and contact isolation. Daughter questioned need for possible enteric isolation secondary to history of c. Diff, with diarrhea as recently as 2 weeks ago but no current loose/runny stools. Manisha at [facility] contacted and pt's VRE and c. Diff history reviewed. Per [facility], pt will remain on contact precautions for VRE but does not need to be placed on enteric precautions unless she begins to have loose/runny stools. Pt, family, and bedside RN updated.

## 2018-04-26 NOTE — PROGRESS NOTES
[facility]    Hospitalist Progress Note      Assessment & Plan   Ms. Sheila Johnson is a 89-year-old female with PMH of HTN, HLP, DM type 2, (insulin-dependent, poorly controlled), depression, arthritis, and previous history of stroke (on Plavix) who was brought into the emergency room with complaints of aphasia.    1. Acute Stroke   - presented with episode of aphasia in the morning followed by a witnessed episode while in the emergency room  - PTA on Plavix but apparently- noncompliant  - CT head -negative; CTA head and neck - Mild to moderate atherosclerotic disease involving the carotid  bifurcations and carotid siphon regions without significant stenosis.  - MRI brain- aAcute appearing 5 mm area of ischemia within the right occipital white matter. No evidence of hemorrhagic transformation of infarct, midline shift, or herniation; evidence of multiple previous microhemorrhages that appear centered in the basal ganglia and are likely due to hypertension given their distribution. Amyloid angiopathy could also be considered  - stroke protocol- frequent neurocheck, Tele  - Neuro consult appreciated- she was loaded with Plavix on admission and continued on Plavix 75 mg po daily (reinforced compliance)  - fasting lipid profile- LDL 71, total chol 144; continue PTA Lipitor 40 mg po daily  - Hb A1c 9.8- needs better cBS control  - Echo- normal EF 60-65%, mild Ao valvular stenosis  - PT/OT/SLP    - was AAOX3 in am; in the afternoon- she reported not feeling well but very vaque; noted to have dropped O2 sat- improved with supplemental O2; HR noted to drop in 30's on Tele, then improved to mid 60's; not on any BB; EKG on admission- NSR with 2nd degree AV block Mobitz II; STAT EKG today - NSR with first degree AV block; denies chest pain; is confused (changed from this am)- cannot tell me what is wrong; reports some headache  - CXR- cardiomegaly and increased vasc congestion  - troponins were  0.031--0.040  - will recheck troponin and proBNP  - stopped iv fluids and will give 1 dose of Lasix 40 mg iv now  - Cardiology consult  - paged Neuro regarding clinical change    2. HTN  - not on any meds at home, except lasix- which she takes for LE edema  - BP reasonable controlled    3. HLP  - continue PTA statin    4. DM type 2  -not controlled  - Hb a1c 9.8  - PTA on Levemir 44 units subq qam  - here switched to Lantus 20 units qHS  - she passed swallow eval in am but now with change in mental status- will rajesh her on decreased dose of Lantus.     5. Possible LLE cellulitis:  - she has b/l LE edema but LLE is slightly erythematous and warm to touch   - started on Ancef on admission- continue for now  - BC pending    # Pain Assessment:  Current Pain Score 4/26/2018   Patient currently in pain? denies   Pain score (0-10) -   Pain location -   Pain descriptors -   Sheila ramsey pain level was assessed and she currently denies pain.      DVT Prophylaxis: Pneumatic Compression Devices  Code Status: Full Code    Disposition: Expected discharge in couple of days.    Skyla Echeverria MD    Interval History   Was awake, alert, oriented X3 in am; in the afternoon- reported not feeling well, but could not be more specific, became confused, seems to have some SOB and some headache, denies chest pain; niece at bedside feels that this episode is very similar to what happened yesterday; discussed with bedside RN, charge RN and niece at bedside.     -Data reviewed today: I reviewed all new labs and imaging results over the last 24 hours. I personally reviewed the brain MRI image(s) showing as above and the Echocardiogram image(s) showing as above.    Physical Exam   Temp: 98.3  F (36.8  C) Temp src: Oral BP: 123/72 Pulse: 74 Heart Rate: 68 Resp: 18 SpO2: 90 % O2 Device: None (Room air)    Vitals:    04/25/18 2219 04/26/18 0611   Weight: 101.2 kg (223 lb) 101.2 kg (223 lb 3.2 oz)     Vital Signs with Ranges  Temp:  [98  F (36.7   C)-99.4  F (37.4  C)] 98.3  F (36.8  C)  Pulse:  [74] 74  Heart Rate:  [67-85] 68  Resp:  [16-18] 18  BP: (123-182)/(71-93) 123/72  SpO2:  [90 %-96 %] 90 %  I/O last 3 completed shifts:  In: 1068 [I.V.:1068]  Out: -     Constitutional: Confused  Respiratory: Bilateral air entry, bibasilar;ar crackles  Cardiovascular: S1S2, RRR, no murmurs, no rubs  GI: abdomen- obese, nonT, BS present  Skin/Integumen: mild erythema over left anterior shin  Extremities- 2+pitting edema /l LE  Neuro- confused, moves all extremities, no facial droop    Medications     - MEDICATION INSTRUCTIONS -       sodium chloride 75 mL/hr at 04/26/18 1000       atorvastatin (LIPITOR) tablet 40 mg  40 mg Oral Daily     ceFAZolin  2 g Intravenous Q8H     clopidogrel  75 mg Oral Daily     famotidine  20 mg Intravenous Q24H     insulin aspart  1-6 Units Subcutaneous Q4H     insulin glargine  20 Units Subcutaneous At Bedtime       Data     Recent Labs  Lab 04/26/18  0610 04/25/18  2255 04/25/18  1809   WBC 10.1  --  13.6*   HGB 11.9  --  13.3   MCV 88  --  89     --  313     --  133   POTASSIUM 3.7  --  4.0   CHLORIDE 102  --  96   CO2 26  --  31   BUN 14  --  19   CR 0.91  --  0.94   ANIONGAP 7  --  6   JAVON 8.1*  --  9.0   *  --  325*   ALBUMIN 2.5*  --  3.0*   PROTTOTAL 6.4*  --  7.6   BILITOTAL 0.5  --  0.6   ALKPHOS 59  --  75   ALT 12  --  14   AST 13  --  11   TROPI 0.040 0.031  --        Recent Results (from the past 24 hour(s))   CT Head w/o Contrast    Narrative    CT SCAN OF THE HEAD WITHOUT CONTRAST   4/25/2018 6:44 PM     HISTORY: Suspect CVA, but on Plavix, please query bleed.     TECHNIQUE:  Axial images of the head and coronal reformations without  IV contrast material.  Radiation dose for this scan was reduced using  automated exposure control, adjustment of the mA and/or kV according  to patient size, or iterative reconstruction technique.    COMPARISON: 6/24/2009    FINDINGS: Mild to moderate cerebral atrophy is  present. Patchy white  matter changes are seen in both hemispheres without mass effect. There  is no evidence for intracranial hemorrhage, mass effect, acute  infarct, or skull fracture. Moderate mucosal thickening is noted in  both maxillary sinuses as well as the right frontal and ethmoid  sinuses.      Impression    IMPRESSION: Chronic changes. No evidence for intracranial hemorrhage  or any acute process. Moderate mucosal thickening noted in the right  frontal and ethmoid air cells as well as both maxillary sinuses.    ERNA BROOKS MD   CT Head w/o Contrast    Narrative    CT SCAN OF THE HEAD WITHOUT CONTRAST   4/25/2018 9:06 PM     HISTORY: Code Stroke.     TECHNIQUE: Axial images of the head and coronal reformations without  IV contrast material. Radiation dose for this scan was reduced using  automated exposure control, adjustment of the mA and/or kV according  to patient size, or iterative reconstruction technique.    COMPARISON: 4/25/2018.    FINDINGS: Mild cerebral atrophy is present. There are some patchy  white matter changes in both hemispheres without mass effect. There is  no evidence for intracranial hemorrhage, mass effect, acute infarct,  or skull fracture. Mucosal thickening is noted in several paranasal  sinuses. There are some thickened secretions in the right frontal  sinus.      Impression    IMPRESSION: Cerebral atrophy with chronic white matter changes. No  evidence for intracranial hemorrhage or any acute brain pathology.    ERNA BROOKS MD   CTA Angiogram Head Neck    Narrative    CTA ANGIOGRAM HEAD AND NECK 4/25/2018 9:12 PM     HISTORY: Code Stroke.    TECHNIQUE: Axial images were obtained through the head and neck  without and with intravenous contrast. 70 mL of Isovue 370 was given.  Multiplanar reconstructions were performed. 3-D reconstructions off a  remote workstation for CT angiography were also acquired. Carotid  stenoses were evaluated by comparing the caliber of the  proximal  internal carotid artery to the caliber of the distal internal carotid  artery. Radiation dose for this scan was reduced using automated  exposure control, adjustment of the mA and/or kV according to patient  size, or iterative reconstruction technique.    FINDINGS:    Brachiocephalic vessels: There is some mild calcific plaque in the  thoracic arch and at the origin the brachiocephalic vessels, but there  is no stenosis.    Right carotid system: Moderate calcific plaque is present resulting in  40% stenosis of the proximal internal carotid artery.    Left carotid system: Mild to moderate calcific plaque. No evidence for  any significant stenosis.    Right vertebral artery: Normal.    Left vertebral artery: Normal.    [place]: There is some calcific plaque in the carotid siphon  regions. Basilar artery is widely patent. The proximal anterior,  middle, and posterior cerebral arteries are patent. There is no  evidence for thromboembolism or aneurysm.      Impression    IMPRESSION:  1. Mild to moderate atherosclerotic disease involving the carotid  bifurcations and carotid siphon regions without significant stenosis.  2. No evidence for any significant stenosis, dissection, aneurysm, or  thromboembolism.    ERNA BROOKS MD   CT Head w Contrast    Narrative    CT HEAD WITH CONTRAST 4/25/2018 9:21 PM     HISTORY: Code stroke.     TECHNIQUE: Axial images were obtained through the brain with  intravenous contrast for CT brain perfusion imaging. Coronal and  sagittal reconstructions were also acquired. 50 mL Isovue-370 was  given. Radiation dose for this scan was reduced using automated  exposure control, adjustment of the mA and/or kV according to patient  size, or iterative reconstruction technique..    FINDINGS: Cerebral blood flow, cerebral blood volume, and mean transit  time maps are symmetric. There is no evidence for ischemia or infarct.      Impression    IMPRESSION: Normal CT brain perfusion  imaging.    ERNA BROOKS MD

## 2018-04-26 NOTE — PROGRESS NOTES
04/26/18 1015  [facility] Clinical Swallow Evaluation   General Information   Onset Date 04/25/18   Start of Care Date 04/26/18   Referring Physician Tamara Lanza MD    Patient Profile Review/OT: Additional Occupational Profile Info See Profile for full history and prior level of function   Patient/Family Goals Statement Pt would like to eat   Swallowing Evaluation Bedside swallow evaluation   Behaviorial Observations Alert   Mode of current nutrition NPO   Respiratory Status Room air   Comments Sheila Johnson is a 89 year old female with a history of diabetes, hypertension, hypercholesterolemia, and prior stroke presenting with symptoms concerning for TIA/stroke. Yesterday morning she had an episode where she had difficulty with speech manifesting as word finding difficulties. This lasted about an hour before resolution. She came to the emergency room for evaluation given that this episode was similar to a prior stroke episode. While in the emergency room she had an episode of aphasia upon awaking from sleep. A stroke code was called for this episode. CT/CTA/CTP performed without obvious abnormality. Upon being evaluated by this provider via tele-stroke she had resolved. There were no focal findings that were able to be elicited at that time.      Upon clinical interview, pt reports hx of esophageal dilation (years ago) with no recurring symtpoms reported. Denies difficulty swallowing prior to admission or after previous stroke. Pt with noted aphasia, but able to answer questions appropriately with cues.   Clinical Swallow Evaluation   Oral Musculature generally intact   Structural Abnormalities none present   Dentition present and adequate   Mucosal Quality good   Mandibular Strength and Mobility intact   Oral Labial Strength and Mobility WFL   Lingual Strength and Mobility WFL   Velar Elevation intact   Buccal Strength and Mobility intact   Laryngeal Function Cough;Throat clear;Swallow;Voicing initiated;Dry  swallow palpated   Additional Documentation Yes   Clinical Swallow Eval: Thin Liquid Texture Trial   Mode of Presentation, Thin Liquids cup   Volume of Liquid or Food Presented 4 oz   Oral Phase of Swallow WFL   Pharyngeal Phase of Swallow intact   Diagnostic Statement no overt s/s of aspiration; swallow function appears grossly functional for thin liquids   Clinical Swallow Eval: Puree Solid Texture Trial   Mode of Presentation, Puree spoon   Volume of Puree Presented 4 oz   Oral Phase, Puree WFL   Pharyngeal Phase, Puree intact   Diagnostic Statement no overt s/s of aspiration; swallow function appears grossly functional for puree consistsencies   Clinical Swallow Eval: Semisolid Texture Trial   Mode of Presentation, Semisolid spoon   Volume of Semisolid Food Presented 2 tsp    Oral Phase, Semisolid WFL   Pharyngeal Phase, Semisolid intact   Diagnostic Statement no overt s/s of aspiration; swallow function appears grossly functional for semi-solid textures   Clinical Swallow Eval: Solid Food Texture Trial   Mode of Presentation, Solid self-fed   Volume of Solid Food Presented dry cereal x6 bites   Oral Phase, Solid WFL   Pharyngeal Phase, Solid intact   Diagnostic Statement no overt s/s of aspiration, adequate mastication; grossly functional for regular solids per clinical presentation   Swallow Compensations   Swallow Compensations Alternate viscosity of consistencies   Results No difficulties noted   Esophageal Phase of Swallow   Patient reports or presents with symptoms of esophageal dysphagia Yes   Esophageal comments Hx of esophageal dilation (years ago)   General Therapy Interventions   Planned Therapy Interventions Dysphagia Treatment   Dysphagia treatment Instruction of safe swallow strategies   Swallow Eval: Clinical Impressions   Skilled Criteria for Therapy Intervention Skilled criteria met.  Treatment indicated.   Functional Assessment Scale (FAS) 7   Treatment Diagnosis WFL oropharyngeal swallow  function   Diet texture recommendations Regular diet;Thin liquids   Recommended Feeding/Eating Techniques small sips/bites;maintain upright posture during/after eating for 30 mins;alternate between small bites and sips of food/liquid   Demonstrates Need for Referral to Another Service physical therapy;occupational therapy   Therapy Frequency 3 times/wk   Predicted Duration of Therapy Intervention (days/wks) 1-2 f/u    Risks and Benefits of Treatment have been explained. Yes   Patient, family and/or staff in agreement with Plan of Care Yes   Clinical Impression Comments Clinical swallow evaluation completed per MD order at 0920. Pt presents with WFL oropharyngeal swallow function per exam. Oral motor function appears grossly intact. No overt s/s of aspiration presented across PO trials this date. Pt denied globus sensation or reflux symptoms.     Recommending initiate regular diet and thin liquids. Pt should be upright, alert, take small bites/sips and alternate liquid/solids. SLP to follow for PO tolerance and education. Anticipate 1-2 sessions with speech-language evaluation deferred to  SLP pending discharge   Total Evaluation Time   Total Evaluation Time (Minutes) 11

## 2018-04-26 NOTE — PROVIDER NOTIFICATION
Page to Neuro- just had another episode of confusion/ WFD. Resolving. Per dtr, similar to episode yesterday while in ED.    Dr. Wilson in to see pt, dtr in room. Plan for EEG. Call neuro with any neuro changes.

## 2018-04-26 NOTE — PLAN OF CARE
Problem: Patient Care Overview  Goal: Plan of Care/Patient Progress Review  Outcome: No Change  Alert and orientated to self and place, disorientated to time and situation. Forgetful. Neuros word finding and expressive aphasia. VSS. Tele SB with BBB and 1st degree AV block, did not STAT EKG due to patient having periods of terrance which showed a 2nd degree AC block type II . NPO diet, patient having lots of word finding difficulty, speech to evaluate. Up with assist x 1 and walker. Denies pain. Q4 hr BS checks with sliding scale. Ancef IV for suspected cellulitis to LLE. Plan see PT, OT, Speech. Family to double check MRI screening form prior to completing MRI.

## 2018-04-26 NOTE — PLAN OF CARE
Problem: Patient Care Overview  Goal: Plan of Care/Patient Progress Review    Discharge Planner SLP   Patient plan for discharge: home  Current status: Clinical swallow evaluation completed per MD order at 0920. Pt presents with WFL oropharyngeal swallow function per exam. Oral motor function appears grossly intact. No overt s/s of aspiration presented across PO trials this date. Pt denied globus sensation or reflux symptoms. Recommending initiate regular diet and thin liquids. Pt should be upright, alert, take small bites/sips and alternate liquid/solids.   Barriers to return to prior living situation: aphasia  Recommendations for discharge: home with HH SLP  Rationale for recommendations: SLP to follow for PO tolerance and education. Anticipate 1-2 sessions with speech-language evaluation deferred to HH SLP pending discharge       Entered by: Yuliet Wei 04/26/2018 10:34 AM

## 2018-04-26 NOTE — PROGRESS NOTES
" 04/26/18 0800   Quick Adds   Type of Visit Initial PT Evaluation   Living Environment   Lives With alone   Living Arrangements independent living facility   Home Accessibility no concerns   Transportation Available car;family or friend will provide   Living Environment Comment Pt has a car, states \"My primary physician took my license away\" - pt reports she plans to \"take a driving test at [facility]\".   Self-Care   Dominant Hand right   Usual Activity Tolerance moderate   Current Activity Tolerance fair   Regular Exercise no   Equipment Currently Used at Home walker, rolling;raised toilet;shower chair;grab bar  (4WW)   Activity/Exercise/Self-Care Comment Pt gets 15 meals per month at [employer] at her facility- otherwise goes out to eat a lot with family, does some light cooking for breakfast/lunch. Pt gets housekeeping assistance. Pt's children assist with laundry and grocery shopping. Pt does her own medications. Pt states she does have a life alert type pendant   Functional Level Prior   Ambulation 1-->assistive equipment   Transferring 1-->assistive equipment   Toileting 1-->assistive equipment   Bathing 1-->assistive equipment   Dressing 0-->independent   Eating 0-->independent   Communication 0-->understands/communicates without difficulty   Swallowing 0-->swallows foods/liquids without difficulty   Cognition 0 - no cognition issues reported   Fall history within last six months yes   Number of times patient has fallen within last six months 3   Which of the above functional risks had a recent onset or change? ambulation;transferring   Prior Functional Level Comment Pt reports she lives in an independent senior apartment with elevator access, walk-in shower with bench, RTS with grab bars. Pt reports Mod I with 4WW for all mobility, IND with ADLs/IADLs except receives 15 meals/month in dining room, pt does some light cooking for other meals vs out to eat with family. Pt has housekeeping services " 1x/month, pt's children provide transportation, groceries, laundry. Pt states she is hopeful to drive again and plans to take a 's exam soon.    General Information   Onset of Illness/Injury or Date of Surgery - Date 04/25/18   Referring Physician Tamara Lanza MD   Patient/Family Goals Statement Not stated   Pertinent History of Current Problem (include personal factors and/or comorbidities that impact the POC) Pt is a 89yo female admitted for evaluation of aphasia - work-up pending, suspected stuttering TIA vs stroke with rapidly resolving symptoms per neuro. PMH significant for CVA, HTN, HLD, DM II.    Precautions/Limitations fall precautions   General Observations Pt sitting up in chair, neurology MD present   General Info Comments Activity: up with assist if neurologically stable   Cognitive Status Examination   Orientation orientation to person, place and time  (did not know president)   Level of Consciousness alert   Follows Commands and Answers Questions 100% of the time;able to follow single-step instructions   Personal Safety and Judgment at risk behaviors demonstrated   Cognitive Comment defer to OT   Pain Assessment   Patient Currently in Pain No   Integumentary/Edema   Integumentary/Edema Comments LLE 2+ edema   Posture    Posture Forward head position;Protracted shoulders   Range of Motion (ROM)   ROM Comment BLE WNL with AROM; limited by body habitus   Strength   Strength Comments BLE appear grossly 4/5   Bed Mobility   Bed Mobility Comments NT - pt up in chair and wanting to remain seated at end of session   Transfer Skills   Transfer Comments CGA sit>stand to FWW   Gait   Gait Comments CGA 10' with FWW, forward flexed posture, WBOS, decreased B step length   Balance   Balance Comments Fairly good static standing balance, unsteady with dynamic reaching without AD/UE support   Sensory Examination   Sensory Perception Comments Denies N/T   Coordination   Coordination Comments B heel to shin  "appear intact   Muscle Tone   Muscle Tone no deficits were identified   General Therapy Interventions   Planned Therapy Interventions balance training;bed mobility training;gait training;neuromuscular re-education;ROM;strengthening;stretching;transfer training;home program guidelines;progressive activity/exercise   Clinical Impression   Criteria for Skilled Therapeutic Intervention yes, treatment indicated   PT Diagnosis Difficulty with gait   Influenced by the following impairments Generalized weakness, decreased balance, decreased activity tolerance   Functional limitations due to impairments Decreased safety and IND with functional transfers and gait   Clinical Presentation Stable/Uncomplicated   Clinical Presentation Rationale clinically stable, work-up pending   Clinical Decision Making (Complexity) Low complexity   Therapy Frequency` daily   Predicted Duration of Therapy Intervention (days/wks) 4 days   Anticipated Discharge Disposition Transitional Care Facility;Home with Home Therapy   Risk & Benefits of therapy have been explained Yes   Patient, Family & other staff in agreement with plan of care Yes   [facility] \"6 Clicks\"   2016, Trustees of [facility] [employer] [facility], under license to [employer].  All rights reserved.   6 Clicks Short Forms Basic Mobility Inpatient Short Form   [facility]  \"6 Clicks\" V.2 Basic Mobility Inpatient Short Form   1. Turning from your back to your side while in a flat bed without using bedrails? 3 - A Little   2. Moving from lying on your back to sitting on the side of a flat bed without using bedrails? 3 - A Little   3. Moving to and from a bed to a chair (including a wheelchair)? 3 - A Little   4. Standing up from a chair using your arms (e.g., wheelchair, or bedside chair)? 3 - A Little   5. To walk in hospital room? 3 - A Little   6. Climbing 3-5 steps with a railing? 2 - A Lot   Basic Mobility Raw Score (Score out of 24.Lower scores equate to lower " levels of function) 17   Total Evaluation Time   Total Evaluation Time (Minutes) 10

## 2018-04-26 NOTE — PLAN OF CARE
"Problem: Patient Care Overview  Goal: Plan of Care/Patient Progress Review  PT: PT orders received, evaluation completed, treatment initiated. Pt is a 89yo female admitted for evaluation of aphasia - work-up pending, suspected stuttering TIA vs stroke with rapidly resolving symptoms per neuro. PMH significant for CVA, HTN, HLD, DM II. Pt AxO x3 at time of eval, dtr present. Pt reports she lives in an independent senior apartment with elevator access, walk-in shower with bench, RTS with grab bars. Pt reports Mod I with 4WW for all mobility, IND with ADLs/IADLs except receives 15 meals/month in dining room, pt does some light cooking for other meals vs out to eat with family. Pt has housekeeping services 1x/month, pt's children provide transportation, groceries, laundry. Pt states she is hopeful to drive again and plans to take a 's exam soon.     Discharge Planner PT   Patient plan for discharge: Home  Current status: Pt sitting up in chair, initially pt with repeated momentary desaturation to ~85% - with new sensor pt maintained >90% throughout session on RA. Pt transfers sit<>stand to FWW with CGA. Pt ambulated 60'x1 and 120'x1 with FWW and CGA - pt reports she feels \"a little shaky\" however no overt LOB noted.   Barriers to return to prior living situation: Decreased activity tolerance, level of A, fall risk, lives alone  Recommendations for discharge: Home with HHPT  Rationale for recommendations: Anticipate with continued therapy and medical management that pt will progress to be able to safely disch to home with use of 4WW and assist from family as PTA. Pt would benefit from HHPT to progress balance, strength, and activity tolerance for improved safety and IND with functional mobility. Pt's family appears very involved and supportive.       Entered by: Dary Wiley 04/26/2018 9:31 AM           "

## 2018-04-26 NOTE — PROGRESS NOTES
RECEIVING UNIT ED HANDOFF REVIEW    ED Nurse Handoff Report was reviewed by: DEDRA WALLACE on April 25, 2018 at 9:56 PM

## 2018-04-26 NOTE — PROGRESS NOTES
04/26/18 1034   Quick Adds   Type of Visit Initial Occupational Therapy Evaluation   Living Environment   Lives With alone   Living Arrangements independent living facility   Home Accessibility no concerns   Transportation Available family or friend will provide   Living Environment Comment pt would like to return to driving   Self-Care   Dominant Hand right   Usual Activity Tolerance moderate   Equipment Currently Used at Home walker, rolling;cane, straight;grab bar;shower chair   Functional Level Prior   Ambulation 1-->assistive equipment   Transferring 1-->assistive equipment   Toileting 1-->assistive equipment   Bathing 1-->assistive equipment   Dressing 0-->independent   Eating 0-->independent   Communication 0-->understands/communicates without difficulty   Swallowing 0-->swallows foods/liquids without difficulty   Cognition 0 - no cognition issues reported   Fall history within last six months yes   Number of times patient has fallen within last six months 3   General Information   Onset of Illness/Injury or Date of Surgery - Date 04/25/18   Referring Physician Tamara Lanza MD   Patient/Family Goals Statement return home and drive   Additional Occupational Profile Info/Pertinent History of Current Problem Tamara Lanza MD   Precautions/Limitations fall precautions   Cognitive Status Examination   Orientation person;place  (reported month was august)   Level of Consciousness alert   Able to Follow Commands WNL/WFL   Cognitive Comment slight impulsivity noted   Visual Perception   Visual Perception Wears glasses   Occulomotor difficulty tracking to left   Visual Perception Comments Pt has noticed blurriness and deficits with L eye   Sensory Examination   Sensory Comments WNL   Pain Assessment   Patient Currently in Pain No   Range of Motion (ROM)   ROM Comment BUE shoulders ~160 for flex and abd   Strength   Strength Comments RUE 4/5 shoulder flex/abd, 5/5 triceps/biceps and wrist flex/ext. LUE 4-/5  "shoulder flex/abd, 5/5 triceps/biceps and wrist flex/ext   Hand Strength   Hand Strength Comments 4/5 in both hands   Coordination   Fine Motor Coordination slowed opposition in both hands    Transfer Skill: Sit to Stand   Level of [place]: Sit/Stand contact guard   Balance   Balance Comments slightly shaky on feet   Lower Body Dressing   Level of [place]: Dress Lower Body stand-by assist   Instrumental Activities of Daily Living (IADL)   Previous Responsibilities meal prep;laundry;medication management   Activities of Daily Living Analysis   Impairments Contributing to Impaired Activities of Daily Living cognition impaired;strength decreased;ROM decreased  (possible visual deficits)   General Therapy Interventions   Planned Therapy Interventions ADL retraining;IADL retraining;cognition;ROM;strengthening   Clinical Impression   Criteria for Skilled Therapeutic Interventions Met yes, treatment indicated   OT Diagnosis decreased independence with ADL/IADLs   Influenced by the following impairments strength, vision, ROM, cognition   Assessment of Occupational Performance 3-5 Performance Deficits   Identified Performance Deficits ADLs, IADLs, safe functional transfers   Clinical Decision Making (Complexity) Moderate complexity   Therapy Frequency daily   Predicted Duration of Therapy Intervention (days/wks) 3 days   Anticipated Discharge Disposition Home with Home Therapy;Home with Assist   Risks and Benefits of Treatment have been explained. Yes   Patient, Family & other staff in agreement with plan of care Yes   [facility] [employer] TM \"6 Clicks\"   2016, Trustees of [facility], under license to [employer].  All rights reserved.   6 Clicks Short Forms Daily Activity Inpatient Short Form   [facility] [employer]  \"6 Clicks\" Daily Activity Inpatient Short Form   1. Putting on and taking off regular lower body clothing? 3 - A Little   2. Bathing (including washing, rinsing, drying)? 3 - A Little "   3. Toileting, which includes using toilet, bedpan or urinal? 3 - A Little   4. Putting on and taking off regular upper body clothing? 3 - A Little   5. Taking care of personal grooming such as brushing teeth? 3 - A Little   6. Eating meals? 4 - None   Daily Activity Raw Score (Score out of 24.Lower scores equate to lower levels of function) 19   Total Evaluation Time   Total Evaluation Time (Minutes) 13

## 2018-04-26 NOTE — ED NOTES
[facility]  ED Nurse Handoff Report    ED Chief complaint: Aphasia (word finding difficulty since 1030, more tired than usual)      ED Diagnosis:   Final diagnoses:   Transient cerebral ischemia, unspecified type   Aphasia       Code Status: Full Code    Allergies: No Known Allergies    Activity level - Baseline/Home:  Independent    Activity Level - Current:   Independent     Needed?: No    Isolation: Yes  Infection: VRE    Bariatric?: No    Vital Signs:   Vitals:    04/25/18 1821 04/25/18 1824 04/25/18 1826   BP: 144/90     Pulse: 74     Resp: 16 16 17   Temp: 99.4  F (37.4  C)     TempSrc: Oral     SpO2: 94% 95% 91%       Cardiac Rhythm: ,        Pain level:      Is this patient confused?: No     Patient Report: Initial Complaint: Aphasia  Focused Assessment: Pt presents to the ED today after she began having some difficulty speaking and finding worse around 1030 AM while at her art class. Pt states that she began to feel fatigued and decided to sleep to see if that would help. Upon waking patient still felt fatigued and continued to have some word finding issues. Pt states that her speaking has improved but not returned to baseline. Hx of stroke and is currently taking Plavix.    Upon hospitalist entering room to assess patient, pt was acting confused and was unable to give events of the day. Pt was disoriented, upon arriving to CT pt was A&Ox4. Pt continues to be A&Ox4 at this time.   Tests Performed: CT head, labs, EKG, UA  Abnormal Results: N/A  Treatments provided: IV    Family Comments: Family at bedside.    OBS brochure/video discussed/provided to patient: Yes    ED Medications: Medications - No data to display    Drips infusing?:  No    For the majority of the shift this patient was Green.   Interventions performed were N/A.    Severe Sepsis OR Septic Shock Diagnosis Present: No      ED NURSE PHONE NUMBER: [phone]

## 2018-04-26 NOTE — PHARMACY-ADMISSION MEDICATION HISTORY
Admission medication history interview status for the 4/25/2018  admission is complete. See EPIC admission navigator for prior to admission medications     Medication history source reliability:Good    Actions taken by pharmacist (provider contacted, etc):Verified all medications with patient's medication list that she brought from home.     Additional medication history information not noted on PTA med list :None    Medication reconciliation/reorder completed by provider prior to medication history?yes    Time spent in this activity: 20 minutes    Prior to Admission medications    Medication Sig Last Dose Taking? Auth Provider   Acetaminophen (TYLENOL PO) Take 1,000 mg by mouth every morning Has stopped taking since Mon, Feb 11th or Tues, Feb 12th, 2013 when she started taking Vicodin  Past Week at Unknown time Yes Unknown, Entered By History   ATORVASTATIN CALCIUM PO Take 40 mg by mouth daily. Past Week at Unknown time Yes Unknown, Entered By History   CITALOPRAM HYDROBROMIDE PO Take 20 mg by mouth daily. Past Week at Unknown time Yes Unknown, Entered By History   clopidogrel (PLAVIX) 75 MG tablet Take 75 mg by mouth daily Past Week at Unknown time Yes Reported, Patient   fluticasone (FLONASE) 50 MCG/ACT spray Spray 2 sprays into both nostrils daily as needed for rhinitis or allergies prn med Yes Unknown, Entered By History   furosemide (LASIX) 20 MG tablet Take 20 mg by mouth daily Past Week at Unknown time Yes Reported, Patient   insulin detemir (LEVEMIR FLEXPEN/FLEXTOUCH) 100 UNIT/ML injection Inject 44 Units Subcutaneous every morning Past Week at Unknown time Yes Unknown, Entered By History   menthol-zinc oxide (CALMOSEPTINE) 0.44-20.625 % OINT ointment Apply topically 2 times daily as needed for skin protection prn med Yes Unknown, Entered By History   Montelukast Sodium (SINGULAIR PO) Take 10 mg by mouth At Bedtime Past Week at Unknown time Yes Unknown, Entered By History   order for DME [employer]  Phone #[phone]  Fax #[phone]  Ready To Wear Knee [employer] Compression Stockings  20-30 mmHg   May need carlene and liner   Length of Need: Life Time  # of Pairs 3   Julieta Horta PA-C

## 2018-04-26 NOTE — H&P
Admitted:     04/25/2018      PRIMARY CARE PHYSICIAN:  Thania Cadena MD      CHIEF COMPLAINT:  Aphasia, difficulty speaking, word finding difficulty.      HISTORY OF PRESENT ILLNESS:  Ms. Sheila Johnson is a 89-year-old female with history of type 2 diabetes mellitus (insulin-dependent, poorly controlled), hypertension, hyperlipidemia, depression, arthritis, and previous history of stroke (on Plavix) who was brought into the emergency room with complaints of aphasia.  This morning at 10:30 a.m., she was at an art class and then she began noticing that she was having difficulty speaking with word finding difficulty.  Her family member noticed her symptoms and brought her into the emergency room.  Her symptoms persisted for an hour and then it got better. On arrival to the emergency room, her symptoms improved and as per the family, she was back to baseline.        She was evaluated by Dr. Gamboa in the emergency room and a CT scan of the head without contrast was done. It showed chronic changes, no evidence for intracranial hemorrhage or any acute process.  Moderate mucosal thickening noted in the right frontal and ethmoidal cells as well as both maxillary sinuses, otherwise no acute findings seen.  As her symptoms started several hours ago and resolving of the symptoms on arrival to the emergency room, no further interventions were done and a request for hospitalization was made for further evaluation of TIA.        On my evaluation in the emergency room when I went to see her, she was resting in bed with the lights turned off. I turned the lights on and woke her up and tried to ask her questions about what happened this morning.  She was unable to tell me anything about what happened and was unable to come up with words, so her symptoms recurred with aphasia. At this time, code stroke was called and another CT scan of the head without contrast was done that was negative. A CT with contrast and CT angiogram  of the head and neck are currently pending.  On my exam, there is no facial droop and strength in her right lower extremity is minimally weaker compared to the left lower extremity. Bilateral upper extremity strength is normal 5/5.  She was able to squeeze my fingers tightly without any issues. She denies any tingling or numbness.  No headache, no nausea or vomiting, no other complaints currently. I called her daughter and updated her about what is going on currently, and also discussed with the on-call neurologist who is aware of the situation and is evaluating her.      PAST MEDICAL HISTORY:   1.  Significant for history of stroke several years ago and she is currently on Plavix.   2.  History of diabetes mellitus, poorly controlled with hemoglobin A1c of 9.8. As per the daughter, patient has not eaten anything since this afternoon and has not taken her insulin yet.   3.  Hypertension.   4.  Hyperlipidemia.   5.  Arthritis.      PAST SURGICAL HISTORY:  Carpal tunnel release and orthopedic surgery.      FAMILY HISTORY:  Reviewed and noncontributory.      SOCIAL HISTORY:  She is a former smoker, remote history of smoking, no alcohol use.  She is .      REVIEW OF SYSTEMS:  Ten-point review of systems was done and is negative except as in HPI.      PHYSICAL EXAMINATION:   VITAL SIGNS:  Her temperature is 99.4 degrees Fahrenheit, afebrile.  Pulse rate is 75-76, blood pressure 144/90, respiratory rate is 16-17.  She is satting 91% on room air.   GENERAL:  She is pleasantly confused currently and is having aphasia with word finding difficulty.  She is not in any acute distress, lying comfortably in bed.   HEENT:  Head is atraumatic, normocephalic.  NECK:  Supple.   HEART:  S1, S2 heard, no murmurs, rubs or gallops.   LUNGS:  Clear to auscultation.  No rales, rhonchi or wheezing.   ABDOMEN:  Soft, nontender and nondistended.  Obesity.  No guarding, rigidity or rebound.   EXTREMITIES:  There is 1+ edema of the  bilateral lower extremities noticed.  LLE erythema noticed consistent with cellulitis   NEUROLOGIC:  Cranial nerves II-XII intact.  No facial droop noticed.  Muscle strength is 5/5 in bilateral upper extremities, right lower extremity felt to have mild weakness 4/5 compared to the left lower extremity.   PSYCHIATRIC:  Mood and affect are normal.  She is pleasantly confused.        LABORATORY AND RADIOLOGICAL INVESTIGATIONS: A 12-lead EKG showed sinus rhythm with first degree AV block with blocked PACs, right bundle branch block, left anterior fascicular block, no changes were seen compared to previous EKG.        CT head is negative.        CT head with contrast and CTA head and neck is currently pending, will be reviewed.      Her CBC showed WBC count of 13.6, hemoglobin 13.3, platelet count at 313.  Blood sugar at 325, elevated. Sodium 133, potassium 4, chloride 96, bicarbonate 31, BUN 19, creatinine 0.91.  Albumin is 3.  All her LFTs are normal.  Hemoglobin A1c of 9.8.  Urinalysis showed few bacteria, 3 squamous epithelial cells, 1 hyaline cast.      ASSESSMENT AND PLAN:     1.  A 89-year-old female with history of prior stroke on Plavix, hypertension, hyperlipidemia, and poorly controlled type 2 diabetes mellitus (insulin-dependent), presenting with recurrent aphasia which could be stuttering TIA versus acute stroke is in the differential, or this could be due to underlying dementia with confusion and memory issues contributing to her word finding difficulty.  As per the family, she did have minimal word finding difficulty at her baseline. She will be hospitalized. Her CT scan of the head is negative, so will continue the Plavix for now until otherwise advised by Neurology depending on her further imaging results. Will gently hydrate her with normal saline at 100 mL per hour and she will be n.p.o. until speech pathology evaluation is done.   2.  Bilateral lower extremity edema in acute stroke setting.  Will hold  her Lasix for now and will gently hydrate her.   3.  Type 2 diabetes mellitus, poorly controlled with hemoglobin A1c of 9.8.  She is going to be n.p.o. currently. Prior to hospitalization, she was on Lantus 48 units subq every evening.  Will give her 20 units of Lantus and monitor her blood sugars closely. She will be on sliding scale insulin with q.4h blood sugars.  With acute stroke setting, will avoid giving her D5.   4. LLE cellulitis:   LLE erythematous and warm to touch consistent with cellulitis. Start IV ancef. Blood cultures times 2 done and results pending      4.  CODE STATUS is discussed with the daughter and she wants her to be FULL CODE.     5.  Deep vein thrombosis prophylaxis with PCDs in the acute stroke setting.     6.  Neurology consultation will be obtained and stroke protocol without tPA will be initiated at this point. She will be hospitalized and closely monitored with neuro checks q.4h and on telemetry. An echocardiogram will be obtained.     # Pain Assessment:  Current Pain Score 2013   Pain score (0-10) 1   Pain location Back   Pain descriptors Spasm   - Jennifer is experiencing pain due to back pain . Pain management was discussed and the plan was created in a collaborative fashion.  Jennifer's response to the current recommendations: ambivalent  - Pharmacologic adjuvants: Acetaminophen          Addendum:  CT head with contrast and CTA head and neck negative only showed mild to moderate atherosclerosis   Rectal aspirin 300mg given in ED             AGNIESZKA CURTIS MD             D: 2018   T: 2018   MT:       Name:     JENNIFER FLORES   MRN:      [id number]        Account:      [id number]   :      1924        Admitted:     2018                   Document: [id number]

## 2018-04-26 NOTE — PLAN OF CARE
Problem: Patient Care Overview  Goal: Plan of Care/Patient Progress Review  Outcome: No Change  Patient here due to word finding difficulty. MRI positive for right occipital infarct. A&O x4 most of the day until 1430 when she had an event where her pulse dipped to 30 and became A/O to person only.  This lasted 15 minutes. Neuros intact with generalized weakness and one time episode of decline in mental status. VSS most of the day except at 1430 when pulse went down to 30. Tele SB with BBB 1st degree AVB. Tolerating regular diet well. Up with A1 + GB + walker. Denies pain. Continue to monitor and follow POC.

## 2018-04-27 ENCOUNTER — APPOINTMENT (OUTPATIENT)
Dept: CARDIOLOGY | Facility: CLINIC | Age: 83
DRG: 041 | End: 2018-04-27
Attending: INTERNAL MEDICINE
Payer: MEDICARE

## 2018-04-27 ENCOUNTER — APPOINTMENT (OUTPATIENT)
Dept: PHYSICAL THERAPY | Facility: CLINIC | Age: 83
DRG: 041 | End: 2018-04-27
Payer: MEDICARE

## 2018-04-27 LAB
ANION GAP SERPL CALCULATED.3IONS-SCNC: 6 MMOL/L (ref 3–14)
BUN SERPL-MCNC: 18 MG/DL (ref 7–30)
CALCIUM SERPL-MCNC: 8.9 MG/DL (ref 8.5–10.1)
CHLORIDE SERPL-SCNC: 102 MMOL/L (ref 94–109)
CO2 SERPL-SCNC: 32 MMOL/L (ref 20–32)
CREAT SERPL-MCNC: 1.11 MG/DL (ref 0.52–1.04)
ERYTHROCYTE [DISTWIDTH] IN BLOOD BY AUTOMATED COUNT: 13.4 % (ref 10–15)
GFR SERPL CREATININE-BSD FRML MDRD: 46 ML/MIN/1.7M2
GLUCOSE BLDC GLUCOMTR-MCNC: 195 MG/DL (ref 70–99)
GLUCOSE BLDC GLUCOMTR-MCNC: 196 MG/DL (ref 70–99)
GLUCOSE BLDC GLUCOMTR-MCNC: 216 MG/DL (ref 70–99)
GLUCOSE BLDC GLUCOMTR-MCNC: 231 MG/DL (ref 70–99)
GLUCOSE BLDC GLUCOMTR-MCNC: 234 MG/DL (ref 70–99)
GLUCOSE BLDC GLUCOMTR-MCNC: 311 MG/DL (ref 70–99)
GLUCOSE SERPL-MCNC: 213 MG/DL (ref 70–99)
HCT VFR BLD AUTO: 39.7 % (ref 35–47)
HGB BLD-MCNC: 13 G/DL (ref 11.7–15.7)
INTERPRETATION ECG - MUSE: NORMAL
MCH RBC QN AUTO: 29.2 PG (ref 26.5–33)
MCHC RBC AUTO-ENTMCNC: 32.7 G/DL (ref 31.5–36.5)
MCV RBC AUTO: 89 FL (ref 78–100)
PLATELET # BLD AUTO: 287 10E9/L (ref 150–450)
POTASSIUM SERPL-SCNC: 5 MMOL/L (ref 3.4–5.3)
RBC # BLD AUTO: 4.45 10E12/L (ref 3.8–5.2)
SODIUM SERPL-SCNC: 140 MMOL/L (ref 133–144)
WBC # BLD AUTO: 12 10E9/L (ref 4–11)

## 2018-04-27 PROCEDURE — 25000132 ZZH RX MED GY IP 250 OP 250 PS 637: Mod: GY | Performed by: INTERNAL MEDICINE

## 2018-04-27 PROCEDURE — 93010 ELECTROCARDIOGRAM REPORT: CPT | Performed by: INTERNAL MEDICINE

## 2018-04-27 PROCEDURE — 25000128 H RX IP 250 OP 636: Performed by: INTERNAL MEDICINE

## 2018-04-27 PROCEDURE — 25000131 ZZH RX MED GY IP 250 OP 636 PS 637: Mod: GY | Performed by: INTERNAL MEDICINE

## 2018-04-27 PROCEDURE — 27210795 ZZH PAD DEFIB QUICK CR4

## 2018-04-27 PROCEDURE — C1785 PMKR, DUAL, RATE-RESP: HCPCS

## 2018-04-27 PROCEDURE — C1898 LEAD, PMKR, OTHER THAN TRANS: HCPCS

## 2018-04-27 PROCEDURE — 99153 MOD SED SAME PHYS/QHP EA: CPT

## 2018-04-27 PROCEDURE — 02H63JZ INSERTION OF PACEMAKER LEAD INTO RIGHT ATRIUM, PERCUTANEOUS APPROACH: ICD-10-PCS | Performed by: INTERNAL MEDICINE

## 2018-04-27 PROCEDURE — 33208 INSRT HEART PM ATRIAL & VENT: CPT | Mod: KX | Performed by: INTERNAL MEDICINE

## 2018-04-27 PROCEDURE — 33208 INSRT HEART PM ATRIAL & VENT: CPT

## 2018-04-27 PROCEDURE — A9270 NON-COVERED ITEM OR SERVICE: HCPCS | Mod: GY | Performed by: PSYCHIATRY & NEUROLOGY

## 2018-04-27 PROCEDURE — 02HK3JZ INSERTION OF PACEMAKER LEAD INTO RIGHT VENTRICLE, PERCUTANEOUS APPROACH: ICD-10-PCS | Performed by: INTERNAL MEDICINE

## 2018-04-27 PROCEDURE — 80048 BASIC METABOLIC PNL TOTAL CA: CPT | Performed by: INTERNAL MEDICINE

## 2018-04-27 PROCEDURE — 93005 ELECTROCARDIOGRAM TRACING: CPT

## 2018-04-27 PROCEDURE — 25000125 ZZHC RX 250: Performed by: INTERNAL MEDICINE

## 2018-04-27 PROCEDURE — 00000146 ZZHCL STATISTIC GLUCOSE BY METER IP

## 2018-04-27 PROCEDURE — 99152 MOD SED SAME PHYS/QHP 5/>YRS: CPT | Performed by: INTERNAL MEDICINE

## 2018-04-27 PROCEDURE — 0JH606Z INSERTION OF PACEMAKER, DUAL CHAMBER INTO CHEST SUBCUTANEOUS TISSUE AND FASCIA, OPEN APPROACH: ICD-10-PCS | Performed by: INTERNAL MEDICINE

## 2018-04-27 PROCEDURE — 99152 MOD SED SAME PHYS/QHP 5/>YRS: CPT

## 2018-04-27 PROCEDURE — 85027 COMPLETE CBC AUTOMATED: CPT | Performed by: INTERNAL MEDICINE

## 2018-04-27 PROCEDURE — 99232 SBSQ HOSP IP/OBS MODERATE 35: CPT | Performed by: PSYCHIATRY & NEUROLOGY

## 2018-04-27 PROCEDURE — A9270 NON-COVERED ITEM OR SERVICE: HCPCS | Mod: GY | Performed by: INTERNAL MEDICINE

## 2018-04-27 PROCEDURE — 36415 COLL VENOUS BLD VENIPUNCTURE: CPT | Performed by: INTERNAL MEDICINE

## 2018-04-27 PROCEDURE — 21000000 ZZH R&B IMCU HEART CARE

## 2018-04-27 PROCEDURE — 40000193 ZZH STATISTIC PT WARD VISIT: Performed by: PHYSICAL THERAPIST

## 2018-04-27 PROCEDURE — C1892 INTRO/SHEATH,FIXED,PEEL-AWAY: HCPCS

## 2018-04-27 PROCEDURE — 99222 1ST HOSP IP/OBS MODERATE 55: CPT | Mod: 25 | Performed by: INTERNAL MEDICINE

## 2018-04-27 PROCEDURE — 25000132 ZZH RX MED GY IP 250 OP 250 PS 637: Mod: GY | Performed by: PSYCHIATRY & NEUROLOGY

## 2018-04-27 PROCEDURE — A9270 NON-COVERED ITEM OR SERVICE: HCPCS | Mod: GY

## 2018-04-27 PROCEDURE — 25000132 ZZH RX MED GY IP 250 OP 250 PS 637: Mod: GY

## 2018-04-27 PROCEDURE — 27210784 ZZH KIT PACEMAKER CR8

## 2018-04-27 PROCEDURE — 97110 THERAPEUTIC EXERCISES: CPT | Mod: GP | Performed by: PHYSICAL THERAPIST

## 2018-04-27 PROCEDURE — 25000131 ZZH RX MED GY IP 250 OP 636 PS 637: Mod: GY

## 2018-04-27 PROCEDURE — 99233 SBSQ HOSP IP/OBS HIGH 50: CPT | Performed by: INTERNAL MEDICINE

## 2018-04-27 RX ORDER — NALOXONE HYDROCHLORIDE 0.4 MG/ML
.1-.4 INJECTION, SOLUTION INTRAMUSCULAR; INTRAVENOUS; SUBCUTANEOUS
Status: DISCONTINUED | OUTPATIENT
Start: 2018-04-27 | End: 2018-04-27

## 2018-04-27 RX ORDER — LIDOCAINE HYDROCHLORIDE 10 MG/ML
10-30 INJECTION, SOLUTION EPIDURAL; INFILTRATION; INTRACAUDAL; PERINEURAL
Status: DISCONTINUED | OUTPATIENT
Start: 2018-04-27 | End: 2018-04-27 | Stop reason: HOSPADM

## 2018-04-27 RX ORDER — CEFAZOLIN SODIUM 1 G/3ML
1 INJECTION, POWDER, FOR SOLUTION INTRAMUSCULAR; INTRAVENOUS
Status: COMPLETED | OUTPATIENT
Start: 2018-04-27 | End: 2018-04-27

## 2018-04-27 RX ORDER — IBUTILIDE FUMARATE 1 MG/10ML
0.01 INJECTION, SOLUTION INTRAVENOUS
Status: DISCONTINUED | OUTPATIENT
Start: 2018-04-27 | End: 2018-04-27 | Stop reason: HOSPADM

## 2018-04-27 RX ORDER — LIDOCAINE 40 MG/G
CREAM TOPICAL
Status: DISCONTINUED | OUTPATIENT
Start: 2018-04-27 | End: 2018-04-28 | Stop reason: HOSPADM

## 2018-04-27 RX ORDER — LIDOCAINE 40 MG/G
CREAM TOPICAL
Status: DISCONTINUED | OUTPATIENT
Start: 2018-04-27 | End: 2018-04-27

## 2018-04-27 RX ORDER — NALOXONE HYDROCHLORIDE 0.4 MG/ML
0.4 INJECTION, SOLUTION INTRAMUSCULAR; INTRAVENOUS; SUBCUTANEOUS EVERY 5 MIN PRN
Status: DISCONTINUED | OUTPATIENT
Start: 2018-04-27 | End: 2018-04-27 | Stop reason: HOSPADM

## 2018-04-27 RX ORDER — BUPIVACAINE HYDROCHLORIDE 2.5 MG/ML
10-30 INJECTION, SOLUTION EPIDURAL; INFILTRATION; INTRACAUDAL
Status: COMPLETED | OUTPATIENT
Start: 2018-04-27 | End: 2018-04-27

## 2018-04-27 RX ORDER — FENTANYL CITRATE 50 UG/ML
25-50 INJECTION, SOLUTION INTRAMUSCULAR; INTRAVENOUS
Status: DISCONTINUED | OUTPATIENT
Start: 2018-04-27 | End: 2018-04-27 | Stop reason: HOSPADM

## 2018-04-27 RX ORDER — KETOROLAC TROMETHAMINE 30 MG/ML
15 INJECTION, SOLUTION INTRAMUSCULAR; INTRAVENOUS
Status: DISCONTINUED | OUTPATIENT
Start: 2018-04-27 | End: 2018-04-27 | Stop reason: HOSPADM

## 2018-04-27 RX ORDER — IBUTILIDE FUMARATE 1 MG/10ML
1 INJECTION, SOLUTION INTRAVENOUS
Status: DISCONTINUED | OUTPATIENT
Start: 2018-04-27 | End: 2018-04-27 | Stop reason: HOSPADM

## 2018-04-27 RX ORDER — PROTAMINE SULFATE 10 MG/ML
5-40 INJECTION, SOLUTION INTRAVENOUS EVERY 10 MIN PRN
Status: DISCONTINUED | OUTPATIENT
Start: 2018-04-27 | End: 2018-04-27 | Stop reason: HOSPADM

## 2018-04-27 RX ORDER — OXYCODONE AND ACETAMINOPHEN 5; 325 MG/1; MG/1
1 TABLET ORAL EVERY 4 HOURS PRN
Status: DISCONTINUED | OUTPATIENT
Start: 2018-04-27 | End: 2018-04-28 | Stop reason: HOSPADM

## 2018-04-27 RX ORDER — CEFAZOLIN SODIUM 2 G/100ML
2 INJECTION, SOLUTION INTRAVENOUS
Status: DISCONTINUED | OUTPATIENT
Start: 2018-04-27 | End: 2018-04-28 | Stop reason: HOSPADM

## 2018-04-27 RX ORDER — HYDRALAZINE HYDROCHLORIDE 20 MG/ML
10 INJECTION INTRAMUSCULAR; INTRAVENOUS EVERY 4 HOURS PRN
Status: DISCONTINUED | OUTPATIENT
Start: 2018-04-27 | End: 2018-04-28 | Stop reason: HOSPADM

## 2018-04-27 RX ORDER — PROMETHAZINE HYDROCHLORIDE 25 MG/ML
6.25-25 INJECTION, SOLUTION INTRAMUSCULAR; INTRAVENOUS EVERY 4 HOURS PRN
Status: DISCONTINUED | OUTPATIENT
Start: 2018-04-27 | End: 2018-04-27 | Stop reason: HOSPADM

## 2018-04-27 RX ORDER — SODIUM CHLORIDE 450 MG/100ML
INJECTION, SOLUTION INTRAVENOUS CONTINUOUS
Status: DISCONTINUED | OUTPATIENT
Start: 2018-04-27 | End: 2018-04-27

## 2018-04-27 RX ORDER — ONDANSETRON 2 MG/ML
4 INJECTION INTRAMUSCULAR; INTRAVENOUS EVERY 4 HOURS PRN
Status: DISCONTINUED | OUTPATIENT
Start: 2018-04-27 | End: 2018-04-27 | Stop reason: HOSPADM

## 2018-04-27 RX ORDER — BUPIVACAINE HYDROCHLORIDE 2.5 MG/ML
10-30 INJECTION, SOLUTION EPIDURAL; INFILTRATION; INTRACAUDAL
Status: DISCONTINUED | OUTPATIENT
Start: 2018-04-27 | End: 2018-04-27 | Stop reason: HOSPADM

## 2018-04-27 RX ORDER — MORPHINE SULFATE 2 MG/ML
1-2 INJECTION, SOLUTION INTRAMUSCULAR; INTRAVENOUS EVERY 5 MIN PRN
Status: DISCONTINUED | OUTPATIENT
Start: 2018-04-27 | End: 2018-04-27 | Stop reason: HOSPADM

## 2018-04-27 RX ORDER — LORAZEPAM 2 MG/ML
.5-2 INJECTION INTRAMUSCULAR EVERY 10 MIN PRN
Status: DISCONTINUED | OUTPATIENT
Start: 2018-04-27 | End: 2018-04-27 | Stop reason: HOSPADM

## 2018-04-27 RX ORDER — LIDOCAINE HYDROCHLORIDE 10 MG/ML
10-30 INJECTION, SOLUTION EPIDURAL; INFILTRATION; INTRACAUDAL; PERINEURAL
Status: COMPLETED | OUTPATIENT
Start: 2018-04-27 | End: 2018-04-27

## 2018-04-27 RX ORDER — FLUMAZENIL 0.1 MG/ML
0.2 INJECTION, SOLUTION INTRAVENOUS
Status: DISCONTINUED | OUTPATIENT
Start: 2018-04-27 | End: 2018-04-27 | Stop reason: HOSPADM

## 2018-04-27 RX ORDER — ATROPINE SULFATE 0.1 MG/ML
.5-1 INJECTION INTRAVENOUS
Status: DISCONTINUED | OUTPATIENT
Start: 2018-04-27 | End: 2018-04-27 | Stop reason: HOSPADM

## 2018-04-27 RX ORDER — LIDOCAINE HYDROCHLORIDE AND EPINEPHRINE 10; 10 MG/ML; UG/ML
10-30 INJECTION, SOLUTION INFILTRATION; PERINEURAL
Status: DISCONTINUED | OUTPATIENT
Start: 2018-04-27 | End: 2018-04-27 | Stop reason: HOSPADM

## 2018-04-27 RX ORDER — ADENOSINE 3 MG/ML
6-12 INJECTION, SOLUTION INTRAVENOUS EVERY 5 MIN PRN
Status: DISCONTINUED | OUTPATIENT
Start: 2018-04-27 | End: 2018-04-27 | Stop reason: HOSPADM

## 2018-04-27 RX ORDER — FAMOTIDINE 20 MG/1
20 TABLET, FILM COATED ORAL AT BEDTIME
Status: DISCONTINUED | OUTPATIENT
Start: 2018-04-27 | End: 2018-04-28 | Stop reason: HOSPADM

## 2018-04-27 RX ORDER — DOBUTAMINE HYDROCHLORIDE 200 MG/100ML
5-40 INJECTION INTRAVENOUS CONTINUOUS PRN
Status: DISCONTINUED | OUTPATIENT
Start: 2018-04-27 | End: 2018-04-27 | Stop reason: HOSPADM

## 2018-04-27 RX ORDER — FUROSEMIDE 10 MG/ML
20-100 INJECTION INTRAMUSCULAR; INTRAVENOUS
Status: DISCONTINUED | OUTPATIENT
Start: 2018-04-27 | End: 2018-04-27 | Stop reason: HOSPADM

## 2018-04-27 RX ORDER — DIPHENHYDRAMINE HYDROCHLORIDE 50 MG/ML
25-50 INJECTION INTRAMUSCULAR; INTRAVENOUS
Status: DISCONTINUED | OUTPATIENT
Start: 2018-04-27 | End: 2018-04-27 | Stop reason: HOSPADM

## 2018-04-27 RX ORDER — PROTAMINE SULFATE 10 MG/ML
1-5 INJECTION, SOLUTION INTRAVENOUS
Status: DISCONTINUED | OUTPATIENT
Start: 2018-04-27 | End: 2018-04-27 | Stop reason: HOSPADM

## 2018-04-27 RX ORDER — HEPARIN SODIUM 1000 [USP'U]/ML
1000-10000 INJECTION, SOLUTION INTRAVENOUS; SUBCUTANEOUS EVERY 5 MIN PRN
Status: DISCONTINUED | OUTPATIENT
Start: 2018-04-27 | End: 2018-04-27 | Stop reason: HOSPADM

## 2018-04-27 RX ADMIN — CEFAZOLIN SODIUM 2 G: 2 INJECTION, SOLUTION INTRAVENOUS at 05:50

## 2018-04-27 RX ADMIN — CLOPIDOGREL 75 MG: 75 TABLET, FILM COATED ORAL at 16:52

## 2018-04-27 RX ADMIN — LIDOCAINE HYDROCHLORIDE 10 ML: 10 INJECTION, SOLUTION EPIDURAL; INFILTRATION; INTRACAUDAL; PERINEURAL at 12:26

## 2018-04-27 RX ADMIN — FAMOTIDINE 20 MG: 20 TABLET, FILM COATED ORAL at 21:33

## 2018-04-27 RX ADMIN — MIDAZOLAM 0.5 MG: 1 INJECTION INTRAMUSCULAR; INTRAVENOUS at 12:26

## 2018-04-27 RX ADMIN — INSULIN ASPART 2 UNITS: 100 INJECTION, SOLUTION INTRAVENOUS; SUBCUTANEOUS at 05:50

## 2018-04-27 RX ADMIN — CEFAZOLIN 1 G: 1 INJECTION, POWDER, FOR SOLUTION INTRAMUSCULAR; INTRAVENOUS at 12:00

## 2018-04-27 RX ADMIN — Medication 25000 UNITS: at 12:43

## 2018-04-27 RX ADMIN — MIDAZOLAM 1 MG: 1 INJECTION INTRAMUSCULAR; INTRAVENOUS at 12:13

## 2018-04-27 RX ADMIN — ATORVASTATIN CALCIUM 40 MG: 40 TABLET, FILM COATED ORAL at 16:52

## 2018-04-27 RX ADMIN — MONTELUKAST SODIUM 10 MG: 10 TABLET, FILM COATED ORAL at 21:33

## 2018-04-27 RX ADMIN — INSULIN ASPART 2 UNITS: 100 INJECTION, SOLUTION INTRAVENOUS; SUBCUTANEOUS at 16:54

## 2018-04-27 RX ADMIN — FENTANYL CITRATE 25 MCG: 50 INJECTION, SOLUTION INTRAMUSCULAR; INTRAVENOUS at 12:26

## 2018-04-27 RX ADMIN — INSULIN ASPART 2 UNITS: 100 INJECTION, SOLUTION INTRAVENOUS; SUBCUTANEOUS at 01:46

## 2018-04-27 RX ADMIN — INSULIN GLARGINE 35 UNITS: 100 INJECTION, SOLUTION SUBCUTANEOUS at 22:15

## 2018-04-27 RX ADMIN — BUPIVACAINE HYDROCHLORIDE 25 MG: 2.5 INJECTION, SOLUTION EPIDURAL; INFILTRATION; INTRACAUDAL at 12:19

## 2018-04-27 RX ADMIN — FENTANYL CITRATE 50 MCG: 50 INJECTION, SOLUTION INTRAMUSCULAR; INTRAVENOUS at 12:13

## 2018-04-27 RX ADMIN — INSULIN ASPART 4 UNITS: 100 INJECTION, SOLUTION INTRAVENOUS; SUBCUTANEOUS at 21:32

## 2018-04-27 ASSESSMENT — VISUAL ACUITY
OU: NORMAL ACUITY;GLASSES

## 2018-04-27 ASSESSMENT — ACTIVITIES OF DAILY LIVING (ADL)
ADLS_ACUITY_SCORE: 19

## 2018-04-27 NOTE — PLAN OF CARE
Problem: Patient Care Overview  Goal: Plan of Care/Patient Progress Review  SLP-  Pt not available, in surgery.  Will reschedule for tomorrow.

## 2018-04-27 NOTE — PLAN OF CARE
Problem: Patient Care Overview  Goal: Plan of Care/Patient Progress Review  Discharge Planner PT   Patient plan for discharge: Home  Current status: Pt was only seen for exercises today due to waiting to have pacemaker procedure and reports dizziness with gait.   Barriers to return to prior living situation: Independent with mobility and assist comes daily  Recommendations for discharge: Home with assist and home PT   Rationale for recommendations: Pt uses walker and has mobility but would benefit from additional support of homecare       Entered by: Sheryl Holden 04/27/2018 12:18 PM

## 2018-04-27 NOTE — PROVIDER NOTIFICATION
FYI- Bp is 184/84-  been elevated after pacemaker.  Patient had stroke- do you want her to have any prn's?  thanks

## 2018-04-27 NOTE — CONSULTS
"[facility]    Cardiac Electrophysiology Consultation     Date of Admission:  4/25/2018  Date of Consult (When I saw the patient): 04/27/18    Assessment & Plan   Sheila Johnson is a 89 year old female who was admitted on 4/25/2018. I was asked to see the patient for falls and conduction abnl. 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 Fell a few times as she would lost her balance without tripping over any objects. Normal LVE. Not on any negative chronotropic drugs. Tele shows occasional non-conducted pacs in daytime and transient chb with escape rate in 40's for a beat or two. No h/o long term monitoring as part of recurrent TIA. Was on coumadin in the past for \"clot in the back of the brain\" but had to stop due to GIB. Never was told to have AFib.    Pt has intrinsic conduction abnl as described above and may have contributed to occasional imbalance resulting in falls. One option is to have her on a 30 days event monitor for documentation but with established conduction abnl I would favor implanting a pacemaker for this reason and moreover as a diagnostic tool for assessment of potential atrial fibrillation that may explain her recurrent TIA.    Family would like to think about it and let us know. In the meantime keep NPO.  Mejia Van Meza    Code Status    Full Code    Primary Care Physician   Thania Cadena    History is obtained from the patient    Past Medical History   I have reviewed this patient's medical history and updated it with pertinent information if needed.   Past Medical History:   Diagnosis Date     Arthritis      Depressive disorder      Diabetes mellitus (H)      Hypercholesteraemia      Hypertension        Past Surgical History   I have reviewed this patient's surgical history and updated it with pertinent information if needed.  Past Surgical History:   Procedure " Laterality Date     ORTHOPEDIC SURGERY       RELEASE CARPAL TUNNEL         Prior to Admission Medications   Prior to Admission Medications   Prescriptions Last Dose Informant Patient Reported? Taking?   ATORVASTATIN CALCIUM PO Past Week at Unknown time Self Yes Yes   Sig: Take 40 mg by mouth daily.   Acetaminophen (TYLENOL PO) Past Week at Unknown time Self Yes Yes   Sig: Take 1,000 mg by mouth every morning Has stopped taking since Mon, Feb 11th or Tues, Feb 12th, 2013 when she started taking Vicodin    CITALOPRAM HYDROBROMIDE PO Past Week at Unknown time Self Yes Yes   Sig: Take 20 mg by mouth daily.   Montelukast Sodium (SINGULAIR PO) Past Week at Unknown time  Yes Yes   Sig: Take 10 mg by mouth At Bedtime   clopidogrel (PLAVIX) 75 MG tablet Past Week at Unknown time  Yes Yes   Sig: Take 75 mg by mouth daily   fluticasone (FLONASE) 50 MCG/ACT spray prn med  Yes Yes   Sig: Spray 2 sprays into both nostrils daily as needed for rhinitis or allergies   furosemide (LASIX) 20 MG tablet Past Week at Unknown time  Yes Yes   Sig: Take 20 mg by mouth daily   insulin detemir (LEVEMIR FLEXPEN/FLEXTOUCH) 100 UNIT/ML injection Past Week at Unknown time  Yes Yes   Sig: Inject 44 Units Subcutaneous every morning   menthol-zinc oxide (CALMOSEPTINE) 0.44-20.625 % OINT ointment prn med  Yes Yes   Sig: Apply topically 2 times daily as needed for skin protection   order for DME   No No   Sig: [employer]  Phone #[phone]  Fax #[phone]  Ready To Wear Knee High Compression Stockings  20-30 mmHg   May need carlene and liner   Length of Need: Life Time  # of Pairs 3      Facility-Administered Medications: None     Allergies   No Known Allergies    Social History   I have reviewed this patient's social history and updated it with pertinent information if needed. Sheila Johnson  reports that she has quit smoking. She does not have any smokeless tobacco history on file. She reports that she does not drink  alcohol or use illicit drugs.    Family History   I have reviewed this patient's family history and updated it with pertinent information if needed.   No family history on file.    Review of Systems   Comprehensive review of systems was performed with pertinent positives and negatives listed in assessment and plan section.    Physical Exam   Temp: 97.4  F (36.3  C) Temp src: Oral BP: 145/75 Pulse: 72 Heart Rate: 58 Resp: 18 SpO2: 97 % O2 Device: None (Room air) Oxygen Delivery: 1 LPM  Vital Signs with Ranges  Temp:  [97.1  F (36.2  C)-98.2  F (36.8  C)] 97.4  F (36.3  C)  Pulse:  [30-81] 72  Heart Rate:  [58-78] 58  Resp:  [18] 18  BP: (131-175)/(61-81) 145/75  SpO2:  [94 %-98 %] 97 %  223 lbs 3.2 oz    Constitutional: awake, alert, cooperative, no apparent distress, and appears stated age  Eyes: Lids and lashes normal, pupils equal, rount, extra ocular muscles intact, sclera clear, conjunctiva normal  ENT: Normocephalic, without obvious abnormality, atraumatic, sinuses nontender on palpation, external ears without lesions, oral pharynx with moist mucous membranes, tonsils without erythema or exudates, gums normal and good dentition.  Hematologic / Lymphatic: no cervical lymphadenopathy  Respiratory: No increased work of breathing, good air exchange, clear to auscultation bilaterally, no crackles or wheezing  Cardiovascular: Normal apical impulse, regular rate and rhythm, normal S1 and S2, no S3 or S4, and no murmur noted  GI: No scars, normal bowel sounds, soft, non-distended, non-tender, no masses palpated, no hepatosplenomegally  Skin: no bruising or bleeding  Musculoskeletal: There is no redness, warmth, or swelling of the joints.  Full range of motion noted.    Neurologic: Awake, alert, oriented to name, place and time.    Neuropsychiatric: General: normal, calm and normal eye contact    Data   I personally reviewed all recent ECGs and images.  Results for orders placed or performed during the hospital  "encounter of 04/25/18 (from the past 24 hour(s))   Glucose by meter   Result Value Ref Range    Glucose 240 (H) 70 - 99 mg/dL   MRI Brain w & w/o contrast    Narrative    MRI BRAIN WITHOUT AND WITH CONTRAST  4/26/2018 11:40 AM    HISTORY: Aphasia.     TECHNIQUE: Multiplanar, multisequence MRI of the brain without and  with 10 mL Gadavist.    COMPARISON: CTA from earlier the same day. Brain MR 5/24/2009.    FINDINGS: 5 mm area of restricted diffusion within the right occipital  white matter concerning for acute area of ischemia. No evidence of  hemorrhagic transformation of infarct. No significant mass effect or  midline shift. No evidence of acute intracranial hemorrhage.  Ventricular size within normal limits without hydrocephalus.    Diffuse parenchymal volume loss. Fairly extensive confluent  periventricular as well as patchy deep and subcortical white matter T2  hyperintensities which are likely due to chronic microvascular  ischemic disease.    Multiple foci of susceptibility hypointensity that are clustered in  the basal ganglia but small lesions also seen within the cerebellum,  brainstem, and a few in the cerebral hemispheres. Given their  distribution, these are favored to represent hypertensive hemorrhages.    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 This does not require  treatment and has little clinical significance.    Marked mucosal thickening within the paranasal sinuses. There is an  air-fluid layer in the right frontal sinus concerning for acute  sinusitis. Right mastoid effusion.      Impression    IMPRESSION:     1. Acute appearing 5 mm area of ischemia within the right occipital  white matter. No evidence of hemorrhagic transformation of infarct,  midline shift, or herniation.  2. Diffuse parenchymal volume loss and white matter changes likely due  to chronic microvascular ischemic disease.  3. Evidence of multiple previous " microhemorrhages that appear centered  in the basal ganglia and are likely due to hypertension given their  distribution. Amyloid angiopathy could also be considered.  4. Marked mucosal thickening in the paranasal sinuses. There is an  air-fluid layer in the right frontal sinus concerning for acute  sinusitis.      RAHUL AGUDELO MD   ECHO COMPLETE BUBBLE STUDY WITH OPTISON    Narrative    [id number]  [id number]  [id number]  [id number]^KIRSTEN^AGNIESZKA^           [facility]  Echocardiography Laboratory  [facility] [place] [address]        Name: JENNIFER FLORES  MRN: [id number]  : 1924  Study Date: 2018 01:39 PM  Age: 89 yrs  Gender: Female  Patient Location: [facility]  Reason For Study: CVA  Ordering Physician: AGNIESZKA CURTIS  Referring Physician: Thania Cdaena  Performed By: Carla Soto     BSA: 1.9 m2  Height: 59 in  Weight: 223 lb  HR: 72  BP: 144/90 mmHg  _____________________________________________________________________________  __        Procedure  Complete Portable Bubble Echo Adult. Contrast Optison.  _____________________________________________________________________________  __        Interpretation Summary     The visual ejection fraction is estimated at 60-65%.  Left ventricular systolic function is normal.  Mild valvular aortic stenosis.  The study was technically difficult.  _____________________________________________________________________________  __        Left Ventricle  The left ventricle is normal in size. There is borderline concentric left  ventricular hypertrophy. Grade I or early diastolic dysfunction. Diastolic  Doppler findings (E/E' ratio and/or other parameters) suggest left ventricular  filling pressures are increased. The visual ejection fraction is estimated at  60-65%. Left ventricular systolic function is normal.     Right Ventricle  The right ventricle is grossly normal size. The right ventricular systolic  function is borderline reduced.      Atria  The left atrium is moderately dilated. The right atrium is mildly dilated. The  bubble study is non diagnostic due to poor image quality. Possibly a few  buubles cross over to the left but I cannot be definite. There is no color  Doppler evidence of an atrial shunt.     Mitral Valve  There is severe mitral annular calcification. There is trace mitral  regurgitation. The mean mitral valve gradient is 4.7 mmHg.        Tricuspid Valve  There is trace tricuspid regurgitation.     Aortic Valve  The aortic valve is not well visualized. No aortic regurgitation is present.  Mild valvular aortic stenosis. The calculated aortic valve are is 1.9 cm^2.  The peak AoV pressure gradient is 16.0 mmHg. The mean AoV pressure gradient is  8.9 mmHg.     Pulmonic Valve  There is no pulmonic valvular regurgitation. Normal pulmonic valve velocity.     Vessels  The aortic root is normal size. Normal size ascending aorta. The inferior vena  cava is not dilated.     Pericardium  There is no pericardial effusion.        Rhythm  Sinus rhythm was noted. The patient exhibited frequent PACs.  _____________________________________________________________________________  __  MMode/2D Measurements & Calculations  IVSd: 1.2 cm     LVIDd: 4.3 cm  LVIDs: 2.4 cm  LVPWd: 1.1 cm  FS: 43.3 %  LV mass(C)d: 180.7 grams  LV mass(C)dI: 93.6 grams/m2  Ao root diam: 3.1 cm  LA dimension: 4.2 cm  asc Aorta Diam: 2.8 cm  LA/Ao: 1.3  LVOT diam: 2.2 cm  LVOT area: 3.9 cm2  LA Volume (BP): 89.8 ml  LA Volume Index (BP): 46.5 ml/m2  RWT: 0.52  TAPSE: 1.9 cm           Doppler Measurements & Calculations  MV E max caron: 103.0 cm/sec  MV A max caron: 154.2 cm/sec  MV E/A: 0.67  MV max PG: 10.8 mmHg  MV mean P.7 mmHg  MV V2 VTI: 40.5 cm  MVA(VTI): 2.1 cm2  MV dec time: 0.22 sec  Ao V2 max: 198.6 cm/sec  Ao max P.0 mmHg  Ao V2 mean: 144.0 cm/sec  Ao mean P.9 mmHg  Ao V2 VTI: 43.1 cm  MIKE(I,D): 1.9 cm2  MIKE(V,D): 2.0 cm2  LV V1 max P.3 mmHg  LV V1  max: 103.7 cm/sec  LV V1 VTI: 21.4 cm  SV(LVOT): 83.2 ml  SI(LVOT): 43.1 ml/m2  AV Aneesh Ratio (DI): 0.52  MIKE Index (cm2/m2): 1.0  E/E' av.4  Lateral E/e': 17.5  Medial E/e': 17.2              _____________________________________________________________________________  __        Report approved by: John Hansen 2018 03:32 PM      Care Coordinator IP Consult    Narrative    Misti Mckinney RN     2018  3:40 PM  Care Coordinator consult. Met with daughter, patient sleeping   currently. Patient may need home care. Care Transition Initial   Assessment - RN        Met with: Patient and Family.  DATA   Active Problems:    Aphasia       Cognitive Status: sleeping.        Contact information and PCP information verified: Yes  Lives With: (P) alone  Living Arrangements: independent living facility                 Insurance concerns: No Insurance issues identified  ASSESSMENT  Patient currently receives the following services:  none        Identified issues/concerns regarding health management: none    PLAN  Financial costs for the patient include NA .  Patient given options and choices for discharge NA .  Patient/family is agreeable to the plan?  Discharge plan to be   determined. Probably will need home care RN/PT/OT  Patient anticipates discharging to home lives at [facility] in [facility]  She uses walker to ambulate . Eats dinner in dinning room [facility],   has a kitchen in her apartment where she prepares her other   meals. Daughters and a niece do her grocery and other shopping.   Does not currently use any services.         Patient anticipates needs for home equipment: No  Plan/Disposition: to be determined possibly home with home care   RN for medication management, PT/OT  Appointments: to be determined   Met with patient she just was helped back to bed after having   difficulty while sitting in chair. Now sleeping. Spoke to her   daughter to get this  information. Daughter states has had   problems remembering or taking her medications as prescribed and   said she will need some kind of help to do this now. Daughter   does not know for sure if [facility] has people they use for   home care. Would possibly want to use [facility] unless   [facility] has resources. Will continue to follow. Will send   referral to [facility] in case they decide to use [facility].   Gave meghana [facility] brochure. Update: [facility]   is also a home care possibility.   Care  (CTS) will continue to follow as   needed.           Glucose by meter   Result Value Ref Range    Glucose 268 (H) 70 - 99 mg/dL   EKG 12-lead, tracing only   Result Value Ref Range    Interpretation ECG Click View Image link to view waveform and result    XR Chest Port 1 View    Narrative    XR CHEST PORT 1 VW 4/26/2018 3:21 PM    COMPARISON: 5/6/2011    HISTORY: Shortness of breath, concern for fluid overload.      Impression    IMPRESSION: Mildly enlarged cardiac silhouette. Pulmonary vascular  congestion is noted. No focal airspace disease, pleural effusion or  pneumothorax.    SRUTHI ALEXIS MD   Cardiology IP Consult: Patient to be seen: Routine - within 24 hours; bradycardia, EKG on admission with 2nd degree AV block; Consultant may enter orders: Yes    Narrative    Amari Longoria MD     4/26/2018  5:47 PM  Cardiology consult dictated   Troponin I   Result Value Ref Range    Troponin I ES 0.041 0.000 - 0.045 ug/L   NT proBNP inpatient and ED   Result Value Ref Range    N-Terminal Pro BNP Inpatient 1264 0 - 1800 pg/mL   Glucose by meter   Result Value Ref Range    Glucose 282 (H) 70 - 99 mg/dL   Glucose by meter   Result Value Ref Range    Glucose 328 (H) 70 - 99 mg/dL   Glucose by meter   Result Value Ref Range    Glucose 234 (H) 70 - 99 mg/dL   Glucose by meter   Result Value Ref Range    Glucose 216 (H) 70 - 99 mg/dL   CBC with platelets   Result Value Ref  Range    WBC 12.0 (H) 4.0 - 11.0 10e9/L    RBC Count 4.45 3.8 - 5.2 10e12/L    Hemoglobin 13.0 11.7 - 15.7 g/dL    Hematocrit 39.7 35.0 - 47.0 %    MCV 89 78 - 100 fl    MCH 29.2 26.5 - 33.0 pg    MCHC 32.7 31.5 - 36.5 g/dL    RDW 13.4 10.0 - 15.0 %    Platelet Count 287 150 - 450 10e9/L   Basic metabolic panel   Result Value Ref Range    Sodium 140 133 - 144 mmol/L    Potassium 5.0 3.4 - 5.3 mmol/L    Chloride 102 94 - 109 mmol/L    Carbon Dioxide 32 20 - 32 mmol/L    Anion Gap 6 3 - 14 mmol/L    Glucose 213 (H) 70 - 99 mg/dL    Urea Nitrogen 18 7 - 30 mg/dL    Creatinine 1.11 (H) 0.52 - 1.04 mg/dL    GFR Estimate 46 (L) >60 mL/min/1.7m2    GFR Estimate If Black 55 (L) >60 mL/min/1.7m2    Calcium 8.9 8.5 - 10.1 mg/dL

## 2018-04-27 NOTE — CONSULTS
"Consult Date:  04/26/2018      CHIEF COMPLAINT:  Difficulty speaking due to expressive aphasia.      HISTORY OF PRESENT ILLNESS:  Ms. Sheila Johnson is a 89-year-old woman who presented to [facility] yesterday with aphasia symptoms and diagnosed with a stroke.  Since she has been here, she has had some abnormal EKGs and telemetry strips reflecting bradycardia and I am asked by the Hospitalist Service to provide cardiology consultation about this rhythm issue.      Ms. Johnson seems to be communicating quite appropriately today.  She does not seem to have any signs of aphasia during my brief conversation with her.  She tells me that she does have lightheadedness.  In fact, her lightheadedness has been getting worse over the last few months, but she has not had any syncopal episodes.  However, she feels lightheaded most all the time and has felt lightheaded quite a bit today.  She has had 3 falling episodes over the last 6 months.  She tells me that she did not trip or slip or lose her balance.  She \"has no idea why she fell.\"  I asked her if she felt lightheaded at that time and she said yes, but she did not faint.      I reviewed the EKGs and rhythm strips available.  She has sinus rhythm with a long first-degree AV block of over 300 milliseconds, right bundle branch block and left anterior fascicular block, and evidence on her initial ECG of second-degree AV block type 1.  There is clearly prolongation of the AK segment and a dropped beat.  At other times, I see tele strips indicating blocked PACs and a heart rate is low as 38 beats per minute.  The P-wave is difficult to identify in some of these strips and it she could be a 2:1 AV block.  There are no prolonged pauses recorded.  In fact, I do not see any pauses greater than 2 seconds and the episode of heart rate down to 38 beats per minute on telemetry strip was at 11:30 p.m., possibly when she was sleeping.      PAST MEDICAL HISTORY: " Includes:   1.  Previous history of stroke for which she has been on Plavix.   2.  Poorly controlled type 2 diabetes.   3.  Hypertension.   4.  Dyslipidemia.   5.  Osteoarthritis.   6.  Carpal tunnel surgery.      FAMILY HISTORY:  Negative for premature cardiac disease.      SOCIAL HISTORY:  She has a remote history of smoking, which she quit many years ago and does not use alcohol.      REVIEW OF SYSTEMS:  Negative except what was noted in history of present illness.      PHYSICAL EXAMINATION:     VITAL SIGNS:  I see heart rates generally of a 67-85.  There is one pulse recorded of 30 beats per minute, but I do not see any associated telemetry strips with that. She is afebrile. Blood pressure is 155/81, respiratory rate 18, oxygen saturation 97% on 2 liters nasal cannula.   GENERAL:  She appears comfortable with examination and breathing easily.   LUNGS:  Clear to auscultation bilaterally.   NECK:  Jugular venous pressure appears grossly normal.  Carotids normal without bruits.   HEENT:  Unremarkable with moist mucous membranes, no xanthoma.   HEART:  Rhythm is regular with normal rate.  No cardiac murmur.   ABDOMEN:  Soft and nontender with no masses or hepatomegaly.   EXTREMITIES:  She has no lower extremity edema, cyanosis or clubbing.  Good peripheral pulses.   SKIN:  Warm and dry and intact.   NEUROLOGIC:  She is alert and oriented with no gross motor defects.      Her home medications  prior to admission did not include any rate slowing medications and she is currently not on any rate slowing medications in the hospital.      IMPRESSION:  Ms. Johnson is a 89-year-old woman with admission, currently with a stroke and presenting symptoms of aphasia.  Her symptoms seem to be improved, and I was consulted for evaluation of bradycardia and possible high-grade AV block.  In my review of her EKGs and telemetry strips, I see a long first degree AV block in the setting of right bundle branch block and left anterior  fascicular block.  There are few Wenckebach cycles (second-degree AV block type 1) and some episodes of blocked PACs.  I do not see any high-grade AV block recorded.  However, her heart rate has gone down to 38 beats per minute on telemetry, which has been recorded.      The difficulty in this situation is that she complains of feeling lightheaded, almost constantly and that makes the determination of whether her bradycardia is symptomatic, quite challenging.  She has no syncopal events, but has had 3 falling episodes in the last several months, which could have been related to episodes of bradycardia.      I will ask Electrophysiology to review this situation and consider whether a pacemaker might be considered.  A better option might be to perform a more prolonged monitoring period to identify any more serious arrhythmias and to help correlate episodes of more significant bradycardia with her symptoms of lightheadedness.         AVIVA NATH MD, [facility]             D: 2018   T: 2018   MT: SRINIVASAN      Name:     JENNIFER FLORES   MRN:      [id number]        Account:       [id number]   :      1924           Consult Date:  2018      Document: [id number]       cc: Thania Cadena MD

## 2018-04-27 NOTE — PROCEDURES
Procedure Date: 04/26/2018      ONE-HOUR ELECTROENCEPHALOGRAM WITH VIDEO       ORDERING PROVIDER:  Sheldon Wilson MD         EEG #       DATE OF STUDY:  04/26/2018      CLINICAL SUMMARY:  The patient is a 89-year-old female who presented with speech difficulties and confusion.  EEG was performed to evaluate for seizures.     TECHNICAL SUMMARY: This continuous video- EEG monitoring procedure was performed with 23 scalp electrodes in 10-20 electrode system placement, and additional scalp, precordial and other surface electrodes used for electrical referencing and artifact detection.  Video monitoring was utilized and periodically reviewed by EEG technologists and the physician for electroclinical correlations.     INTERICTAL ACTIVITY:  The patient appeared to be drowsy throughout most of the recording.  When the patient was more alert and was talking, there was occasional 6-7 Hz theta activity over the posterior head regions, which was symmetric and appeared to be reactive.  Diffuse theta-delta slowing was present, and slow roving eye movements were seen throughout most of the recording.  No epileptiform discharges or focal slowing were present.      CLINICAL/ICTAL EVENTS:  No electrographic or clinical seizures were recorded.      IMPRESSION:  This is an abnormal video EEG.  The patient was drowsy during most of the recording, but even when she was more alert and talking, there was some diffuse theta slowing, which is consistent with mild diffuse nonspecific encephalopathy.  No epileptiform discharges were present.  No electrographic or clinical seizures were recorded.  This is somewhat due to drowsiness, but also indicates mild diffuse nonspecific encephalopathy.  No epileptiform discharges were present.  No electrographic or clinical seizures were recorded.         ROSA M VILLELA MD             D: 04/27/2018   T: 04/27/2018   MT: AARON      Name:     JENNIFER FLORES   MRN:      [id number]         Account:        [id number]   :      1924           Procedure Date: 2018      Document: [id number]

## 2018-04-27 NOTE — PLAN OF CARE
Problem: Patient Care Overview  Goal: Plan of Care/Patient Progress Review  Outcome: Improving  A&O.   Report called from 73.   VSS. Tele  Was 2nd degree block before pacemaker.   Reg diet- however patient is diabetic. Denies pain. Plan:   To get pacemaker.  Up w/ ax1 and walker.  R. Occipital infarct, fogetful, word finding difficulty, expressive aphasia in past, however day nurse did not hear any- no other neuro deficits.  Was having a lot of dizziness at home with frequent falls.  1 peripheral IV in R. Ac.  Family at bedside.  Nurse thought patient lives alone.

## 2018-04-27 NOTE — PROGRESS NOTES
[id number]: A&Ox4. VSS on 2LPM NC. Pt anxious and frustrated with word finding difficulty. Up with A1,walker and gait belt. Lasix given this afternoon. Regular diet well tolerated. Denies pain. EEG complete. Cardiology consult for tomorrow. Tele in place. Continue to monitor.

## 2018-04-27 NOTE — PLAN OF CARE
Problem: Patient Care Overview  Goal: Discharge Needs Assessment  Outcome: No Change  Alert and oriented. VSS on 1L NC. Neuros intact, word finding difficulties at times. CMS intact. Up with assist of 1 and walker. Tolerating regular diet. Voiding. Tele 2nd degree AV block type ll and SR with 1st degree AV block with BBB. Plan for cardiology consult today.

## 2018-04-27 NOTE — PROGRESS NOTES
[facility]  Vascular Neurology Progress Note  04/27/18    Patient Name: Sheila NGUYEN Tolland    Interval events:  No events overnight. Episode of speech difficulty when aroused from sleep yesterday. EEG performed yesterday afternoon.       Objective:  B/P: 145/75, T: 97.4, P: 72, R: 18    GENERAL: NAD, sitting in chair  HEENT: NC/AT. Mucous membranes moist.  CARDIAC: RRR without  Murmur.  RESPIRATORY: Good effort. CTAB. No w/r/r appreciated.  ABDOMINAL: Soft, non tender, non distended. + BS.   EXTREMITIES: Warm, dry.     NEUROLOGIC:  MS: Awake, alert, oriented to situation, place, time  CN:    II - visual fields intact  III, IV, VI - EOMI, PERRL, no nystagmus noted  V - facial sensation intact and equal   VII - facial movement symmetrical  VIII - hearing intact to conversation  IX, X - uvula midline  XII - tongue midline with full ROM  MOTOR: normal tone throughout, no abnormal movements, strength 4+/5 grossly throughout  SENSORY: intact and symmetric to light touch throughout upper and lower extremities  COORDINATION: FNF intact    National [employer] Stroke Scale   Score    Level of consciousness: (0)   Alert, keenly responsive    LOC questions: (0)   Answers both questions correctly    LOC commands: (0)   Performs both tasks correctly    Best gaze: (0)   Normal    Visual: (0)   No visual loss    Facial palsy: (0)   Normal symmetrical movements    Motor arm (left): (0)   No drift    Motor arm (right): (0)   No drift    Motor leg (left): (0)   No drift    Motor leg (right): (0)   No drift    Limb ataxia: (0)   Absent    Sensory: (0)   Normal- no sensory loss    Best language: (0)   Normal- no aphasia    Dysarthria: (0)   Normal    Extinction and inattention: (0)   No abnormality        Total Score:  0     Imaging  No new imaging    Pertinent Studies  LDL 71  A1C 9.8  Echocardiogram   Left Ventricle  The left ventricle is normal in size. There is borderline concentric left  ventricular  hypertrophy. Grade I or early diastolic dysfunction. Diastolic  Doppler findings (E/E' ratio and/or other parameters) suggest left ventricular  filling pressures are increased. The visual ejection fraction is estimated at  60-65%. Left ventricular systolic function is normal.     Right Ventricle  The right ventricle is grossly normal size. The right ventricular systolic  function is borderline reduced.     Atria  The left atrium is moderately dilated. The right atrium is mildly dilated. The  bubble study is non diagnostic due to poor image quality. Possibly a few  buubles cross over to the left but I cannot be definite. There is no color  Doppler evidence of an atrial shunt.      Medications    atorvastatin (LIPITOR) tablet 40 mg  40 mg Oral Daily     clopidogrel  75 mg Oral Daily     famotidine  20 mg Oral At Bedtime     insulin aspart  1-6 Units Subcutaneous Q4H     insulin glargine  20 Units Subcutaneous At Bedtime     montelukast (SINGULAIR) tablet 10 mg  10 mg Oral At Bedtime       Assessment & Plan:  Sheila Johnson is a 89 year old female who presents with right hemisphere ischemic infarct, etiology likely cardioembolic, but small vessel disease given comorbidities not excluded. Initial head CT/CTA shows no acute abnormality. MRI with small area of diffusion restriction in centrum semiovale on the right.     EEG performed yesterday, awaiting repeat. Discussed with daughter and patient at length the idea of waiting for this read and should there be an abnormality seen to start low-dose Keppra. If the EEG was inconclusive we would revisit the idea of empirically starting low-dose Keppra as there is still a possibility that the stereotypical events would represent a small seizure focus.    Continue on clopidogrel, atorvastatin, and all diabetes regimen for secondary stroke prevention. Dilated atria suggest a possible underlying dysrhythmia. She may undergo pacemaker placement per cardiology and with this  device may have long-term rhythm monitoring to assess for possible underlying atrial fibrillation that may necessitate anticoagulation.      Sheldon Wilson MD  Vascular Neurology/Neurocritical Care  Text Page - [phone]

## 2018-04-27 NOTE — PROGRESS NOTES
[facility]    Hospitalist Progress Note      Assessment & Plan   Ms. Sheila Johnson is a 89-year-old female with PMH of HTN, HLP, DM type 2, (insulin-dependent, poorly controlled), depression, arthritis, and previous history of stroke (on Plavix) who was brought into the emergency room with complaints of aphasia.    1. Acute Stroke   - presented with episode of aphasia in the morning followed by another witnessed episode while in the emergency room  - PTA on Plavix but apparently- noncompliant  - CT head -negative; CTA head and neck - Mild to moderate atherosclerotic disease involving the carotid  bifurcations and carotid siphon regions without significant stenosis.  - MRI brain 4/26- Acute appearing 5 mm area of ischemia within the right occipital white matter. No evidence of hemorrhagic transformation of infarct, midline shift, or herniation; evidence of multiple previous microhemorrhages that appear centered in the basal ganglia and are likely due to hypertension given their distribution. Amyloid angiopathy could also be considered  - stroke protocol- frequent neurocheck, Tele  - Neuro consult appreciated- she was loaded with Plavix on admission and continued on Plavix 75 mg po daily (reinforced compliance)  - fasting lipid profile- LDL 71, total chol 144; continue PTA Lipitor 40 mg po daily  - Hb A1c 9.8- needs better BS control  - Echo- normal EF 60-65%, mild Ao valvular stenosis  - PT/OT/SLP    - yesterday afternoon- she had another episode of change in mentation with confusion/not feeling well that lasted for 15-30 minutes; her niece reported that the episode was similar with the initial episode at home;  HR dropped in mid 30's, then improved to mid 60's; was also mildly hypoxic  - unclear cause if this events- seizures? Vs cardiac etiology  - EEG as per Neuro  - Cardiology/EP consulted- see below  - she is back to baseline today    2. Conduction abnormalities  - initial EKG on admission-  read as NSR with 2nd degree AV block Mobitz II but seems more 1st degree AV block with transient CHB with blocked conduction for a beat; RBBB and left anterior fascicular block  - repeat EKG yesterday when she had another event of confusion - NSR with first degree AV block  - she is not on any beta blockers  - not sure if her events of aphasia/not feeling well are related to conduction abnormalities  - troponins were 0.031--0.040--0.041  - Echo as above; TSH 1.62  - CardiologyEP consults appreciated  - plan for pacemaker placement today (would be a diagnostic tool to assess her for possible Afib also that might explain her stroke)    3. HTN  - not on any meds at home, except lasix- which she takes for LE edema  - PTA Lasix was held on admission  - yesterday -there was concern for fluid overload (during the episode of confusion- O2 sat dropped in mid 80's, there was some crackles at bases, proBNP 1264, CXR- some vasc congestion)  - 1 dose of Lasix 40 mg iv given yesterday; hold further diuretics at this time, creatinine is up a little 1.11  - BP reasonable controlled/permissive HTN in the setting of acute stroke  - continue to monitor bloodpressures    4. HLP  - continue PTA statin    5. DM type 2  - not controlled; Hb a1c 9.8  - PTA on Levemir 44 units subq qam  - here switched to Lantus 20 units qHS because of npo status because stroke/confusion  - she will have PPM today, then likely will resume diet  - will order Lantus 35 units qhs now; adjust the doses as needed  - Accuchecks and ISS    6. Concern LLE cellulitis:  - she has b/l LE edema but LLE is slightly erythematous but she states that is chronic  - there was initial concern for cellulitis so she was started on Ancef on admission  - not convinced that this is cellulitis, plus she stated that she had C diffX3 in the past  - d/c Ancef, monitor clinically, monitor fever curve and WBCs trend (WBCs today 12)  - BC pending    # Pain Assessment:  Current Pain Score  4/26/2018   Patient currently in pain? denies   Pain score (0-10) -   Pain location -   Pain descriptors -   Sheila ramsey pain level was assessed and she currently denies pain.      DVT Prophylaxis: Pneumatic Compression Devices  Code Status: Full Code    Disposition: Expected discharge in couple of days.    Skyla Echeverria MD    Interval History    Doing fine this morning, AAOX3; denies chest pain; reports some chronic SOB with exertion, denies abd pain, no N/V; discussed with Neurology, bedside RN, and niece at bedside.     -Data reviewed today: I reviewed all new labs and imaging results over the last 24 hours. I personally reviewed no images or EKG's today.    Physical Exam   Temp: 97.4  F (36.3  C) Temp src: Oral BP: 145/75 Pulse: 72 Heart Rate: 58 Resp: 18 SpO2: 97 % O2 Device: None (Room air) Oxygen Delivery: 1 LPM  Vitals:    04/25/18 2219 04/26/18 0611   Weight: 101.2 kg (223 lb) 101.2 kg (223 lb 3.2 oz)     Vital Signs with Ranges  Temp:  [97.1  F (36.2  C)-98.2  F (36.8  C)] 97.4  F (36.3  C)  Pulse:  [30-81] 72  Heart Rate:  [58-78] 58  Resp:  [18] 18  BP: (131-175)/(61-81) 145/75  SpO2:  [94 %-98 %] 97 %  I/O last 3 completed shifts:  In: 1315.42 [P.O.:460; I.V.:855.42]  Out: 800 [Urine:800]    Constitutional: Awake, alert, NAD, up in the chart  Respiratory: Bilateral air entry, no wheezing, no rales no crackles  Cardiovascular: S1S2, RRR, no murmurs, no rubs  GI: abdomen- obese, nonT, BS present  Skin/Integumen: mild erythema over left anterior shin  Extremities- 1+pitting edema /l LE  Neuro- AAOX3, no FNDs    Medications     - MEDICATION INSTRUCTIONS -         atorvastatin (LIPITOR) tablet 40 mg  40 mg Oral Daily     clopidogrel  75 mg Oral Daily     famotidine  20 mg Oral At Bedtime     insulin aspart  1-6 Units Subcutaneous Q4H     insulin glargine  20 Units Subcutaneous At Bedtime     montelukast (SINGULAIR) tablet 10 mg  10 mg Oral At Bedtime       Data     Recent Labs  Lab 04/27/18  7205  "04/26/18  1630 04/26/18  0610 04/25/18  2255 04/25/18  1809   WBC 12.0*  --  10.1  --  13.6*   HGB 13.0  --  11.9  --  13.3   MCV 89  --  88  --  89     --  282  --  313     --  135  --  133   POTASSIUM 5.0  --  3.7  --  4.0   CHLORIDE 102  --  102  --  96   CO2 32  --  26  --  31   BUN 18  --  14  --  19   CR 1.11*  --  0.91  --  0.94   ANIONGAP 6  --  7  --  6   JAVON 8.9  --  8.1*  --  9.0   *  --  196*  --  325*   ALBUMIN  --   --  2.5*  --  3.0*   PROTTOTAL  --   --  6.4*  --  7.6   BILITOTAL  --   --  0.5  --  0.6   ALKPHOS  --   --  59  --  75   ALT  --   --  12  --  14   AST  --   --  13  --  11   TROPI  --  0.041 0.040 0.031  --        Recent Results (from the past 24 hour(s))   MRI Brain w & w/o contrast    Narrative    MRI BRAIN WITHOUT AND WITH CONTRAST  4/26/2018 11:40 AM    HISTORY: Aphasia.     TECHNIQUE: Multiplanar, multisequence MRI of the brain without and  with 10 mL Gadavist.    COMPARISON: CTA from earlier the same day. Brain MR 5/24/2009.    FINDINGS: 5 mm area of restricted diffusion within the right occipital  white matter concerning for acute area of ischemia. No evidence of  hemorrhagic transformation of infarct. No significant mass effect or  midline shift. No evidence of acute intracranial hemorrhage.  Ventricular size within normal limits without hydrocephalus.    Diffuse parenchymal volume loss. Fairly extensive confluent  periventricular as well as patchy deep and subcortical white matter T2  hyperintensities which are likely due to chronic microvascular  ischemic disease.    Multiple foci of susceptibility hypointensity that are clustered in  the basal ganglia but small lesions also seen within the cerebellum,  brainstem, and a few in the cerebral hemispheres. Given their  distribution, these are favored to represent hypertensive hemorrhages.    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 This " does not require  treatment and has little clinical significance.    Marked mucosal thickening within the paranasal sinuses. There is an  air-fluid layer in the right frontal sinus concerning for acute  sinusitis. Right mastoid effusion.      Impression    IMPRESSION:     1. Acute appearing 5 mm area of ischemia within the right occipital  white matter. No evidence of hemorrhagic transformation of infarct,  midline shift, or herniation.  2. Diffuse parenchymal volume loss and white matter changes likely due  to chronic microvascular ischemic disease.  3. Evidence of multiple previous microhemorrhages that appear centered  in the basal ganglia and are likely due to hypertension given their  distribution. Amyloid angiopathy could also be considered.  4. Marked mucosal thickening in the paranasal sinuses. There is an  air-fluid layer in the right frontal sinus concerning for acute  sinusitis.      RAHUL AGUDELO MD   XR Chest Port 1 View    Narrative    XR CHEST PORT 1 VW 4/26/2018 3:21 PM    COMPARISON: 5/6/2011    HISTORY: Shortness of breath, concern for fluid overload.      Impression    IMPRESSION: Mildly enlarged cardiac silhouette. Pulmonary vascular  congestion is noted. No focal airspace disease, pleural effusion or  pneumothorax.    SRUTHI ALEXIS MD

## 2018-04-28 ENCOUNTER — APPOINTMENT (OUTPATIENT)
Dept: SPEECH THERAPY | Facility: CLINIC | Age: 83
DRG: 041 | End: 2018-04-28
Payer: MEDICARE

## 2018-04-28 ENCOUNTER — APPOINTMENT (OUTPATIENT)
Dept: GENERAL RADIOLOGY | Facility: CLINIC | Age: 83
DRG: 041 | End: 2018-04-28
Attending: INTERNAL MEDICINE
Payer: MEDICARE

## 2018-04-28 VITALS
TEMPERATURE: 97.6 F | BODY MASS INDEX: 47.46 KG/M2 | HEART RATE: 78 BPM | OXYGEN SATURATION: 94 % | RESPIRATION RATE: 16 BRPM | DIASTOLIC BLOOD PRESSURE: 73 MMHG | WEIGHT: 220 LBS | HEIGHT: 57 IN | SYSTOLIC BLOOD PRESSURE: 159 MMHG

## 2018-04-28 LAB
ANION GAP SERPL CALCULATED.3IONS-SCNC: 6 MMOL/L (ref 3–14)
BUN SERPL-MCNC: 18 MG/DL (ref 7–30)
CALCIUM SERPL-MCNC: 8.6 MG/DL (ref 8.5–10.1)
CHLORIDE SERPL-SCNC: 104 MMOL/L (ref 94–109)
CO2 SERPL-SCNC: 28 MMOL/L (ref 20–32)
CREAT SERPL-MCNC: 0.89 MG/DL (ref 0.52–1.04)
ERYTHROCYTE [DISTWIDTH] IN BLOOD BY AUTOMATED COUNT: 13.5 % (ref 10–15)
GFR SERPL CREATININE-BSD FRML MDRD: 59 ML/MIN/1.7M2
GLUCOSE BLDC GLUCOMTR-MCNC: 143 MG/DL (ref 70–99)
GLUCOSE BLDC GLUCOMTR-MCNC: 207 MG/DL (ref 70–99)
GLUCOSE BLDC GLUCOMTR-MCNC: 292 MG/DL (ref 70–99)
GLUCOSE BLDC GLUCOMTR-MCNC: 328 MG/DL (ref 70–99)
GLUCOSE SERPL-MCNC: 149 MG/DL (ref 70–99)
HCT VFR BLD AUTO: 37.6 % (ref 35–47)
HGB BLD-MCNC: 12.3 G/DL (ref 11.7–15.7)
MCH RBC QN AUTO: 28.9 PG (ref 26.5–33)
MCHC RBC AUTO-ENTMCNC: 32.7 G/DL (ref 31.5–36.5)
MCV RBC AUTO: 89 FL (ref 78–100)
PLATELET # BLD AUTO: 265 10E9/L (ref 150–450)
POTASSIUM SERPL-SCNC: 3.8 MMOL/L (ref 3.4–5.3)
RBC # BLD AUTO: 4.25 10E12/L (ref 3.8–5.2)
SODIUM SERPL-SCNC: 138 MMOL/L (ref 133–144)
WBC # BLD AUTO: 11.2 10E9/L (ref 4–11)

## 2018-04-28 PROCEDURE — A9270 NON-COVERED ITEM OR SERVICE: HCPCS | Mod: GY | Performed by: PSYCHIATRY & NEUROLOGY

## 2018-04-28 PROCEDURE — 99232 SBSQ HOSP IP/OBS MODERATE 35: CPT | Performed by: PSYCHIATRY & NEUROLOGY

## 2018-04-28 PROCEDURE — 40000225 ZZH STATISTIC SLP WARD VISIT

## 2018-04-28 PROCEDURE — 80048 BASIC METABOLIC PNL TOTAL CA: CPT | Performed by: INTERNAL MEDICINE

## 2018-04-28 PROCEDURE — 99207 ZZC MOONLIGHTING INDICATOR: CPT | Performed by: INTERNAL MEDICINE

## 2018-04-28 PROCEDURE — 92526 ORAL FUNCTION THERAPY: CPT | Mod: GN

## 2018-04-28 PROCEDURE — 40000986 XR CHEST 2 VW

## 2018-04-28 PROCEDURE — 99239 HOSP IP/OBS DSCHRG MGMT >30: CPT | Performed by: INTERNAL MEDICINE

## 2018-04-28 PROCEDURE — 00000146 ZZHCL STATISTIC GLUCOSE BY METER IP

## 2018-04-28 PROCEDURE — 85027 COMPLETE CBC AUTOMATED: CPT | Performed by: INTERNAL MEDICINE

## 2018-04-28 PROCEDURE — 99232 SBSQ HOSP IP/OBS MODERATE 35: CPT | Mod: 24 | Performed by: INTERNAL MEDICINE

## 2018-04-28 PROCEDURE — 25000132 ZZH RX MED GY IP 250 OP 250 PS 637: Mod: GY | Performed by: INTERNAL MEDICINE

## 2018-04-28 PROCEDURE — 36415 COLL VENOUS BLD VENIPUNCTURE: CPT | Performed by: INTERNAL MEDICINE

## 2018-04-28 PROCEDURE — 25000132 ZZH RX MED GY IP 250 OP 250 PS 637: Mod: GY | Performed by: PSYCHIATRY & NEUROLOGY

## 2018-04-28 PROCEDURE — A9270 NON-COVERED ITEM OR SERVICE: HCPCS | Mod: GY | Performed by: INTERNAL MEDICINE

## 2018-04-28 RX ADMIN — INSULIN ASPART 2 UNITS: 100 INJECTION, SOLUTION INTRAVENOUS; SUBCUTANEOUS at 01:26

## 2018-04-28 RX ADMIN — CLOPIDOGREL 75 MG: 75 TABLET, FILM COATED ORAL at 08:57

## 2018-04-28 RX ADMIN — ACETAMINOPHEN 650 MG: 325 TABLET, FILM COATED ORAL at 05:20

## 2018-04-28 RX ADMIN — ATORVASTATIN CALCIUM 40 MG: 40 TABLET, FILM COATED ORAL at 08:57

## 2018-04-28 RX ADMIN — INSULIN ASPART 1 UNITS: 100 INJECTION, SOLUTION INTRAVENOUS; SUBCUTANEOUS at 08:57

## 2018-04-28 RX ADMIN — INSULIN ASPART 4 UNITS: 100 INJECTION, SOLUTION INTRAVENOUS; SUBCUTANEOUS at 13:36

## 2018-04-28 ASSESSMENT — PAIN DESCRIPTION - DESCRIPTORS: DESCRIPTORS: SORE

## 2018-04-28 ASSESSMENT — VISUAL ACUITY
OU: NORMAL ACUITY
OU: NORMAL ACUITY;GLASSES
OU: NORMAL ACUITY;GLASSES

## 2018-04-28 NOTE — PROGRESS NOTES
Denies pain. DC to home with daughter and HHC and therapies. SS and care coordinator following. Appts made for dc, cards, PCP, neuro as able. Diabetes video and education. PPM education and handout. VRE handout all reviewed with pt and family. Ambulatory with walker. Daughter driving home. All belongings with pt.

## 2018-04-28 NOTE — PROVIDER NOTIFICATION
"Pt reported L \"head pressure\". Lasts less than 5 sec. Resolved on its own without tx. States happens approx 1 x per day.  "

## 2018-04-28 NOTE — PLAN OF CARE
Problem: Patient Care Overview  Goal: Plan of Care/Patient Progress Review  Discharge Planner SLP   [place] plan for [place].   Current status: Swallowing is going well and pt is tolerating regular diet with thins without difficulty, per family report. Pt took single sips of thins by cup without issue. SLP provided education on  speech therapy services, including potential for speech language eval as the pt is having slightly worsened word finding difficulties after this stroke. Pt is in agreement with this plan and SLP gave some strategies for word finding to pt and family. RECS: No further need for swallowing therapy, and pt is going to defer speech-language eval to next level of care. Needs home health ST orders.   Barriers to return to prior living situation: None  Recommendations for discharge:  Speech for speech-language and word finding specifically  Rationale for recommendations: Mild increased word finding difficulty.        Entered by: Ericka Choi 04/28/2018 11:04 AM     Speech Language Therapy Discharge Summary    Reason for therapy discharge:    Discharged to home with home therapy.    Progress towards therapy goal(s). See goals on Care Plan in [facility] electronic health record for goal details.  Goals met    Therapy recommendation(s):    Continued therapy is recommended.  Rationale/Recommendations:  Home health speech eval for speech-language and word finding specifically.

## 2018-04-28 NOTE — PLAN OF CARE
Problem: Patient Care Overview  Goal: Plan of Care/Patient Progress Review  Occupational Therapy Discharge Summary    Reason for therapy discharge:    Discharged to home with home therapy.    Progress towards therapy goal(s). See goals on Care Plan in [facility] electronic health record for goal details.  Goals not met.  Barriers to achieving goals:   discharge from facility.    Therapy recommendation(s):    Continued therapy is recommended.  Rationale/Recommendations:  To maximize I in ADL/IADL.

## 2018-04-28 NOTE — PLAN OF CARE
Problem: Patient Care Overview  Goal: Plan of Care/Patient Progress Review  Physical Therapy Discharge Summary    Reason for therapy discharge:    Discharged to home with home therapy.    Progress towards therapy goal(s). See goals on Care Plan in [facility] electronic health record for goal details.  Goals not met.  Barriers to achieving goals:   discharge from facility.    Therapy recommendation(s):    Continued therapy is recommended.  Rationale/Recommendations:  Pt would benefit from HHPT to progress balance, strength, and activity tolerance for improved safety and IND with functional mobility. .

## 2018-04-28 NOTE — PROGRESS NOTES
Home Care referral sent to [facility] per family request .  Faxed handoff to [phone] to Dr. Amaya for PCP appointment for May 7th at 2:20 pm.

## 2018-04-28 NOTE — PROGRESS NOTES
[facility]  Vascular Neurology Progress Note  04/28/18    Patient Name: Sheila Bustosveland    Interval events:  S/p PPM placement yesterday. No further episodes of speech difficulties.      Objective:  B/P: 133/80, T: 97.6, P: 72, R: 14    GENERAL: NAD, lying on bed  HEENT: NC/AT. Mucous membranes moist.  CARDIAC: RRR without  Murmur.  RESPIRATORY: Good effort. CTAB. No w/r/r appreciated.  ABDOMINAL: Soft, non tender, non distended. + BS.   EXTREMITIES: Warm, dry.     NEUROLOGIC:  MS: drowsy, but arrousable, oriented to self, place, year  CN:    II - visual fields intact  III, IV, VI - EOMI, PERRL, no nystagmus noted  V - facial sensation intact and equal   VII - facial movement symmetrical  VIII - hearing intact to conversation  IX, X - uvula midline  XII - tongue midline with full ROM  MOTOR: normal tone throughout, no abnormal movements, strength 4+/5 throughout  SENSORY: intact and symmetric to light touch throughout upper and lower extremities  REFLEXES: 2+ and symmetric in patellar, biceps, and brachioradialis  COORDINATION: FNF intact    National [employer] Stroke Scale   Score    Level of consciousness: (1)   Not alert; arousable w/ minor stim to obey/answer/respond    LOC questions: (0)   Answers both questions correctly    LOC commands: (0)   Performs both tasks correctly    Best gaze: (0)   Normal    Visual: (0)   No visual loss    Facial palsy: (0)   Normal symmetrical movements    Motor arm (left): (0)   No drift    Motor arm (right): (0)   No drift    Motor leg (left): (0)   No drift    Motor leg (right): (0)   No drift    Limb ataxia: (0)   Absent    Sensory: (0)   Normal- no sensory loss    Best language: (0)   Normal- no aphasia    Dysarthria: (0)   Normal    Extinction and inattention: (0)   No abnormality        Total Score:  1     Imaging  No new imaging    Pertinent Studies  LDL   LDL Cholesterol Calculated   Date Value Ref Range Status   04/26/2018 71 <100 mg/dL Final      Comment:     Desirable:       <100 mg/dl   ]  A1C   Lab Results   Component Value Date    A1C 9.8 2018    A1C 6.9 2013    A1C 6.4 2009     Echocardiogram  Recent Results (from the past 4320 hour(s))   ECHO COMPLETE BUBBLE STUDY WITH OPTISON    Narrative    [id number]  [id number]  [id number]  [id number]^KIRSTEN^AGNIESZKA^           [facility]  Echocardiography Laboratory  [address] [place] [address]        Name: JENNIFER FLORES  MRN: [id number]  : 1924  Study Date: 2018 01:39 PM  Age: 89 yrs  Gender: Female  Patient Location: [facility]  Reason For Study: CVA  Ordering Physician: AGNIESZKA CURTIS  Referring Physician: Thania Cadena  Performed By: Carla Soto     BSA: 1.9 m2  Height: 59 in  Weight: 223 lb  HR: 72  BP: 144/90 mmHg  _____________________________________________________________________________  __        Procedure  Complete Portable Bubble Echo Adult. Contrast Optison.  _____________________________________________________________________________  __        Interpretation Summary     The visual ejection fraction is estimated at 60-65%.  Left ventricular systolic function is normal.  Mild valvular aortic stenosis.  The study was technically difficult.  _____________________________________________________________________________  __        Left Ventricle  The left ventricle is normal in size. There is borderline concentric left  ventricular hypertrophy. Grade I or early diastolic dysfunction. Diastolic  Doppler findings (E/E' ratio and/or other parameters) suggest left ventricular  filling pressures are increased. The visual ejection fraction is estimated at  60-65%. Left ventricular systolic function is normal.     Right Ventricle  The right ventricle is grossly normal size. The right ventricular systolic  function is borderline reduced.     Atria  The left atrium is moderately dilated. The right atrium is mildly dilated. The  bubble study is non  diagnostic due to poor image quality. Possibly a few  buubles cross over to the left but I cannot be definite. There is no color  Doppler evidence of an atrial shunt.     Mitral Valve  There is severe mitral annular calcification. There is trace mitral  regurgitation. The mean mitral valve gradient is 4.7 mmHg.        Tricuspid Valve  There is trace tricuspid regurgitation.     Aortic Valve  The aortic valve is not well visualized. No aortic regurgitation is present.  Mild valvular aortic stenosis. The calculated aortic valve are is 1.9 cm^2.  The peak AoV pressure gradient is 16.0 mmHg. The mean AoV pressure gradient is  8.9 mmHg.     Pulmonic Valve  There is no pulmonic valvular regurgitation. Normal pulmonic valve velocity.     Vessels  The aortic root is normal size. Normal size ascending aorta. The inferior vena  cava is not dilated.     Pericardium  There is no pericardial effusion.        Rhythm  Sinus rhythm was noted. The patient exhibited frequent PACs.  _____________________________________________________________________________  __  MMode/2D Measurements & Calculations  IVSd: 1.2 cm     LVIDd: 4.3 cm  LVIDs: 2.4 cm  LVPWd: 1.1 cm  FS: 43.3 %  LV mass(C)d: 180.7 grams  LV mass(C)dI: 93.6 grams/m2  Ao root diam: 3.1 cm  LA dimension: 4.2 cm  asc Aorta Diam: 2.8 cm  LA/Ao: 1.3  LVOT diam: 2.2 cm  LVOT area: 3.9 cm2  LA Volume (BP): 89.8 ml  LA Volume Index (BP): 46.5 ml/m2  RWT: 0.52  TAPSE: 1.9 cm           Doppler Measurements & Calculations  MV E max aneesh: 103.0 cm/sec  MV A max aneesh: 154.2 cm/sec  MV E/A: 0.67  MV max PG: 10.8 mmHg  MV mean P.7 mmHg  MV V2 VTI: 40.5 cm  MVA(VTI): 2.1 cm2  MV dec time: 0.22 sec  Ao V2 max: 198.6 cm/sec  Ao max P.0 mmHg  Ao V2 mean: 144.0 cm/sec  Ao mean P.9 mmHg  Ao V2 VTI: 43.1 cm  MIKE(I,D): 1.9 cm2  MIKE(V,D): 2.0 cm2  LV V1 max P.3 mmHg  LV V1 max: 103.7 cm/sec  LV V1 VTI: 21.4 cm  SV(LVOT): 83.2 ml  SI(LVOT): 43.1 ml/m2  AV Aneesh Ratio (DI): 0.52  MIKE  Index (cm2/m2): 1.0  E/E' av.4  Lateral E/e': 17.5  Medial E/e': 17.2              _____________________________________________________________________________  __        Report approved by: John Hansen 2018 03:32 PM          Medications    atorvastatin (LIPITOR) tablet 40 mg  40 mg Oral Daily     ceFAZolin  2 g Intravenous Pre-Op/Pre-procedure x 1 dose     clopidogrel  75 mg Oral Daily     famotidine  20 mg Oral At Bedtime     insulin aspart  1-6 Units Subcutaneous Q4H     insulin glargine  35 Units Subcutaneous At Bedtime     montelukast (SINGULAIR) tablet 10 mg  10 mg Oral At Bedtime     sodium chloride (PF)  3 mL Intracatheter Q8H       Assessment & Plan:  Sheila Johnson is a 89 year old female who presents with right hemisphere ischemic infarct, etiology likely cardioembolic, but small vessel disease given comorbidities not excluded. Initial head CT/CTA shows no acute abnormality. MRI with small area of diffusion restriction in centrum semiovale on the right.     Secondary prevention: Keep with Plavix and statin. Need to work on better glucose control, discussed this importance with patient this morning. Blood pressure goal of normotension.    We also discussed at some length the initiation of an antiepileptic empirically given the stereotypical nature of the events. Given that there weren't any concerning findings on the EEG it was felt that a trial without the antiepileptics would be fine. In addition with the new pacer it would not be clear if there was a benefit from the medication or the pacer though the likelihood of there being a cardiac cause of stereotypical neurologic findings would be extremely low. She will need follow up with neurology in 4-6 weeks to discuss her progress and if there had been any more events that are of concern.     No further neurologic interventions or investigations from our perspective at this time. Neurology will sign off. Please call with any  further question.     Sheldon Wilson MD  Vascular Neurology/Neurocritical Care  Text Page - [phone]

## 2018-04-28 NOTE — PROGRESS NOTES
"EP Cardiology Progress Note  Hermes Otero MD         Assessment and Plan:      1.  Advanced AV conduction system disease.  Second-degree AVB with RBBB/LAFB on the conducted beats, prior falls:  Pt had dual Biotronik PM yesterday.  Device interrogation OK, CXR OK (A-lead slack somewhat limited, o/w OK), wound ---> small hematoma.   - OK to DC from our standpoint  - will arrange for outpatient device clinic f/up in 7-10 days.  - post device care precautions d/w pt + family     Total Time: 25 min;  13 minutes spent in direct communication with patient / family and care coordination.               Interval History:   no new complaints          Review of Systems:   CONSTITUTIONAL: NEGATIVE for fever, chills, change in weight  ENT/MOUTH: NEGATIVE for ear, mouth and throat problems  RESP: NEGATIVE for significant cough or SOB  CV: minimal incisional chest pain; no palpitations or peripheral edema          Physical Exam:        Blood pressure 133/80, pulse 72, temperature 97.6  F (36.4  C), temperature source Oral, resp. rate 14, height 1.448 m (4' 9\"), weight 99.8 kg (220 lb), SpO2 94 %.  Vitals:    04/25/18 2219 04/26/18 0611 04/28/18 0527   Weight: 101.2 kg (223 lb) 101.2 kg (223 lb 3.2 oz) 99.8 kg (220 lb)     Vital Signs with Ranges  Temp:  [97.6  F (36.4  C)-98.7  F (37.1  C)] 97.6  F (36.4  C)  Heart Rate:  [63-95] 69  Resp:  [10-25] 14  BP: (133-187)/() 133/80  SpO2:  [87 %-100 %] 94 %  I/O's Last 24 hours  I/O last 3 completed shifts:  In: 360 [P.O.:360]  Out: 720 [Urine:720]    EXAM:  Alert  Lungs: clear bilat  CV: RRR, 1/6 GOMEZ  SKIN: small hematoma  CV: RRR         Medications:          atorvastatin (LIPITOR) tablet 40 mg  40 mg Oral Daily     ceFAZolin  2 g Intravenous Pre-Op/Pre-procedure x 1 dose     clopidogrel  75 mg Oral Daily     famotidine  20 mg Oral At Bedtime     insulin aspart  1-6 Units Subcutaneous Q4H     insulin glargine  35 Units Subcutaneous At Bedtime     montelukast (SINGULAIR) tablet 10 " mg  10 mg Oral At Bedtime     sodium chloride (PF)  3 mL Intracatheter Q8H            Data:      All new lab and imaging data was reviewed.   Recent Labs   Lab Test  18   0540  18   0832  18   0610   11   1505   WBC  11.2*  12.0*  10.1   < >  12.4*   HGB  12.3  13.0  11.9   < >  13.3   MCV  89  89  88   < >  87   PLT  265  287  282   < >  319   INR   --    --    --    --   1.07    < > = values in this interval not displayed.      Recent Labs   Lab Test  18   0540  18   0832  18   0610   NA  138  140  135   POTASSIUM  3.8  5.0  3.7   CHLORIDE  104  102  102   CO2  28  32  26   BUN  18  18  14   CR  0.89  1.11*  0.91   ANIONGAP  6  6  7   JAVON  8.6  8.9  8.1*   GLC  149*  213*  196*     Recent Labs   Lab Test  18   1630  18   0610  18   2255   TROPI  0.041  0.040  0.031         EKG results:  Reviewed     Echo Results:  Recent Results (from the past 4320 hour(s))   ECHO COMPLETE BUBBLE STUDY WITH OPTISON    Narrative    [id number]  [facility]  [id number]  [id number]^KIRSTEN^AGNIESZKA^           [facility]  Echocardiography Laboratory  [address] [place], [place] [address]        Name: JENNIFER FLORES  MRN: [id number]  : 1924  Study Date: 2018 01:39 PM  Age: 89 yrs  Gender: Female  Patient Location: [facility]  Reason For Study: CVA  Ordering Physician: AGNIESZKA CURTIS  Referring Physician: Thania Cadena  Performed By: Carla Soto     BSA: 1.9 m2  Height: 59 in  Weight: 223 lb  HR: 72  BP: 144/90 mmHg  _____________________________________________________________________________  __        Procedure  Complete Portable Bubble Echo Adult. Contrast Optison.  _____________________________________________________________________________  __        Interpretation Summary     The visual ejection fraction is estimated at 60-65%.  Left ventricular systolic function is normal.  Mild valvular aortic stenosis.  The study was technically  difficult.  _____________________________________________________________________________  __        Left Ventricle  The left ventricle is normal in size. There is borderline concentric left  ventricular hypertrophy. Grade I or early diastolic dysfunction. Diastolic  Doppler findings (E/E' ratio and/or other parameters) suggest left ventricular  filling pressures are increased. The visual ejection fraction is estimated at  60-65%. Left ventricular systolic function is normal.     Right Ventricle  The right ventricle is grossly normal size. The right ventricular systolic  function is borderline reduced.     Atria  The left atrium is moderately dilated. The right atrium is mildly dilated. The  bubble study is non diagnostic due to poor image quality. Possibly a few  buubles cross over to the left but I cannot be definite. There is no color  Doppler evidence of an atrial shunt.     Mitral Valve  There is severe mitral annular calcification. There is trace mitral  regurgitation. The mean mitral valve gradient is 4.7 mmHg.        Tricuspid Valve  There is trace tricuspid regurgitation.     Aortic Valve  The aortic valve is not well visualized. No aortic regurgitation is present.  Mild valvular aortic stenosis. The calculated aortic valve are is 1.9 cm^2.  The peak AoV pressure gradient is 16.0 mmHg. The mean AoV pressure gradient is  8.9 mmHg.     Pulmonic Valve  There is no pulmonic valvular regurgitation. Normal pulmonic valve velocity.     Vessels  The aortic root is normal size. Normal size ascending aorta. The inferior vena  cava is not dilated.     Pericardium  There is no pericardial effusion.        Rhythm  Sinus rhythm was noted. The patient exhibited frequent PACs.  _____________________________________________________________________________  __  MMode/2D Measurements & Calculations  IVSd: 1.2 cm     LVIDd: 4.3 cm  LVIDs: 2.4 cm  LVPWd: 1.1 cm  FS: 43.3 %  LV mass(C)d: 180.7 grams  LV mass(C)dI: 93.6  grams/m2  Ao root diam: 3.1 cm  LA dimension: 4.2 cm  asc Aorta Diam: 2.8 cm  LA/Ao: 1.3  LVOT diam: 2.2 cm  LVOT area: 3.9 cm2  LA Volume (BP): 89.8 ml  LA Volume Index (BP): 46.5 ml/m2  RWT: 0.52  TAPSE: 1.9 cm           Doppler Measurements & Calculations  MV E max aneesh: 103.0 cm/sec  MV A max aneesh: 154.2 cm/sec  MV E/A: 0.67  MV max PG: 10.8 mmHg  MV mean P.7 mmHg  MV V2 VTI: 40.5 cm  MVA(VTI): 2.1 cm2  MV dec time: 0.22 sec  Ao V2 max: 198.6 cm/sec  Ao max P.0 mmHg  Ao V2 mean: 144.0 cm/sec  Ao mean P.9 mmHg  Ao V2 VTI: 43.1 cm  MIKE(I,D): 1.9 cm2  MIKE(V,D): 2.0 cm2  LV V1 max P.3 mmHg  LV V1 max: 103.7 cm/sec  LV V1 VTI: 21.4 cm  SV(LVOT): 83.2 ml  SI(LVOT): 43.1 ml/m2  AV Aneesh Ratio (DI): 0.52  MIKE Index (cm2/m2): 1.0  E/E' av.4  Lateral E/e': 17.5  Medial E/e': 17.2              _____________________________________________________________________________  __        Report approved by: John Hansen 2018 03:32 PM          Imaging:   Recent Results (from the past 24 hour(s))   X-ray Chest 2 vws*    Narrative    CHEST TWO VIEWS  2018 8:08 AM     COMPARISON: Frontal chest x-ray 2018.    HISTORY: Post cardiac implant. Check for pneumothorax and lead  position.     FINDINGS: There is a dual-lead pacemaker module implanted over the  left chest with the leads in the right atrium and right ventricle.    The cardiac silhouette, pulmonary vasculature, lungs and pleural  spaces are within normal limits.      Impression    IMPRESSION: Clear lungs. New dual-lead pacemaker. No pneumothorax.

## 2018-04-28 NOTE — PLAN OF CARE
Problem: Patient Care Overview  Goal: Plan of Care/Patient Progress Review  Outcome: Improving  Pt VSS on RA, bp slightly elevated, didn't require hydralazine. Tele 100% vpaced. A&Ox4. Contact precautions maintained. Up with A of 2, using commode. PPM site some dried drainage, unchanged, site soft. Denies pain. Slept. Plan for CXR and interrogation in the am. Continue to monitor.

## 2018-04-28 NOTE — DISCHARGE SUMMARY
[facility]ist Discharge Summary    Sheila Johnson MRN# [id number]   Age: 89 year old YOB: 1924     Date of Admission:  4/25/2018  Date of Discharge::  4/28/2018  Admitting Physician:  Tamara Lanza MD  Discharge Physician:  Davon Jeong MD  Primary Physician: Thania Cadena  Transferring Facility: N/A     Home clinic: [address] [facility]          Admission Diagnoses:   Aphasia [R47.01]  Transient cerebral ischemia, unspecified type [G45.9]          Discharge Diagnosis:   Principle diagnosis: acute CVA, pacemaker placement  Secondary diagnoses:  Patient Active Problem List   Diagnosis     Back pain     Aphasia          Brief History of Presenting Illness:   As per admit hx  Ms. Sheila Johnson is a 89-year-old female with history of type 2 diabetes mellitus (insulin-dependent, poorly controlled), hypertension, hyperlipidemia, depression, arthritis, and previous history of stroke (on Plavix) who was brought into the emergency room with complaints of aphasia.  This morning at 10:30 a.m., she was at an art class and then she began noticing that she was having difficulty speaking with word finding difficulty.  Her family member noticed her symptoms and brought her into the emergency room.  Her symptoms persisted for an hour and then it got better. On arrival to the emergency room, her symptoms improved and as per the family, she was back to baseline.         She was evaluated by Dr. Gamboa in the emergency room and a CT scan of the head without contrast was done. It showed chronic changes, no evidence for intracranial hemorrhage or any acute process.  Moderate mucosal thickening noted in the right frontal and ethmoidal cells as well as both maxillary sinuses, otherwise no acute findings seen.  As her symptoms started several hours ago and resolving of the symptoms on arrival to the emergency room, no further interventions were done and a request for  hospitalization was made for further evaluation of TIA.       Recent Results (from the past 24 hour(s))   X-ray Chest 2 vws*    Narrative    CHEST TWO VIEWS  4/28/2018 8:08 AM     COMPARISON: Frontal chest x-ray 4/26/2018.    HISTORY: Post cardiac implant. Check for pneumothorax and lead  position.     FINDINGS: There is a dual-lead pacemaker module implanted over the  left chest with the leads in the right atrium and right ventricle.    The cardiac silhouette, pulmonary vasculature, lungs and pleural  spaces are within normal limits.      Impression    IMPRESSION: Clear lungs. New dual-lead pacemaker. No pneumothorax.              Hospital Course:   R HEMISPHERIC ISCHEMIC INFARCT  Presented with episode of aphasia  followed by another witnessed episode while in the emergency room. PTA on Plavix but apparently- noncompliant. CT head -negative; CTA head and neck - Mild to moderate atherosclerotic disease involving the carotid bifurcations and carotid siphon regions without significant stenosis. MRI brain 4/26- Acute appearing 5 mm area of ischemia within the right occipital white matter. No evidence of hemorrhagic transformation of infarct, midline shift, or herniation; evidence of multiple previous microhemorrhages that appear centered in the basal ganglia and are likely due to hypertension given their distribution. Amyloid angiopathy could also be considered. On 4/26 she had another episode of change in mentation with confusion/not feeling well that lasted for 15-30 minutes; her niece reported that the episode was similar with the initial episode at home;  HR dropped in mid 30's, then improved to mid 60's; was also mildly hypoxic.  EEG unremarkable. S/P pacemaker.  -will f/u cards and neuro in clinic. Now with pacemaker in, if she has any more such episodes, neuro might put her on emperic anti epileptic meds.    Advanced AV conduction system disease.  Second-degree AVB with RBBB/LAFB on the conducted beats, prior  falls:  Pt had dual Biotronik PM 4/27.  Device interrogation 4/28 was OK, CXR OK (A-lead slack somewhat limited, o/w OK), wound ---> small hematoma.   -outpatient device clinic f/up in 7-10 days.    HTN  Stable. Was variable in hospital  -f/u OP. Could consider small dose antiHTN in future.    DM2  Continue home insulin regimen    LE CELLULITIS--ruled out. antbx were d/joie. Has no fever/ erythema.    DISPO  Seen by SW. Felt ok to be discharged to [facility] with home RN/PT/OT/AIDE              Procedures:   pacemaker         Allergies:    No Known Allergies          Medications Prior to Admission:     Prescriptions Prior to Admission   Medication Sig Dispense Refill Last Dose     Acetaminophen (TYLENOL PO) Take 1,000 mg by mouth every morning Has stopped taking since Mon, Feb 11th or Tues, Feb 12th, 2013 when she started taking Vicodin    Past Week at Unknown time     ATORVASTATIN CALCIUM PO Take 40 mg by mouth daily.   Past Week at Unknown time     CITALOPRAM HYDROBROMIDE PO Take 20 mg by mouth daily.   Past Week at Unknown time     clopidogrel (PLAVIX) 75 MG tablet Take 75 mg by mouth daily   Past Week at Unknown time     fluticasone (FLONASE) 50 MCG/ACT spray Spray 2 sprays into both nostrils daily as needed for rhinitis or allergies   prn med     furosemide (LASIX) 20 MG tablet Take 20 mg by mouth daily   Past Week at Unknown time     insulin detemir (LEVEMIR FLEXPEN/FLEXTOUCH) 100 UNIT/ML injection Inject 44 Units Subcutaneous every morning   Past Week at Unknown time     menthol-zinc oxide (CALMOSEPTINE) 0.44-20.625 % OINT ointment Apply topically 2 times daily as needed for skin protection   prn med     Montelukast Sodium (SINGULAIR PO) Take 10 mg by mouth At Bedtime   Past Week at Unknown time     order for DME [employer]  Phone #[phone]  Fax #[phone]  Ready To Wear Knee High Compression Stockings  20-30 mmHg   May need carlene and liner   Length of Need: Life Time  # of Pairs  "3 3 Device 0              Discharge Medications:     Current Discharge Medication List      CONTINUE these medications which have NOT CHANGED    Details   Acetaminophen (TYLENOL PO) Take 1,000 mg by mouth every morning Has stopped taking since Mon, Feb 11th or Tues, Feb 12th, 2013 when she started taking Vicodin       ATORVASTATIN CALCIUM PO Take 40 mg by mouth daily.      CITALOPRAM HYDROBROMIDE PO Take 20 mg by mouth daily.      clopidogrel (PLAVIX) 75 MG tablet Take 75 mg by mouth daily      fluticasone (FLONASE) 50 MCG/ACT spray Spray 2 sprays into both nostrils daily as needed for rhinitis or allergies      furosemide (LASIX) 20 MG tablet Take 20 mg by mouth daily      insulin detemir (LEVEMIR FLEXPEN/FLEXTOUCH) 100 UNIT/ML injection Inject 44 Units Subcutaneous every morning      menthol-zinc oxide (CALMOSEPTINE) 0.44-20.625 % OINT ointment Apply topically 2 times daily as needed for skin protection      Montelukast Sodium (SINGULAIR PO) Take 10 mg by mouth At Bedtime      order for [facility] [employer]  Phone #[phone]  Fax #[phone]  Ready To Wear Knee High Compression Stockings  20-30 mmHg   May need carlene and liner   Length of Need: Life Time  # of Pairs 3  Qty: 3 Device, Refills: 0    Associated Diagnoses: Open wound of knee, leg, and ankle, unspecified laterality, subsequent encounter; Swelling of limb; Localized edema; Lymphedema of both lower extremities; Chronic ulcer of ankle, unspecified laterality, 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); Venous ulcer of ankle, unspecified laterality (H)         STOP taking these medications       insulin glargine (LANTUS) 100 UNIT/ML injection Comments:   Reason for Stopping:                     Consultations:   Consultation during this admission received from cardiology and neurology            Discharge Exam:   Blood pressure 133/80, pulse 72, temperature 97.6  F (36.4  C), temperature source Oral, resp. rate 14, height 1.448 m (4' 9\"), weight " 99.8 kg (220 lb), SpO2 94 %.  GENERAL APPEARANCE: healthy, alert and no distress  EYES: conjunctiva clear, eyes grossly normal  HENT: external ears and nose normal   NECK: supple, no masses or adenopathy  RESP: lungs clear to auscultation - no rales, rhonchi or wheezes  CV: regular rate and rhythm, normal S1 S2, no S3 or S4 and no murmur, click or rub   ABDOMEN: soft, nontender, no HSM or masses and bowel sounds normal  MS: no clubbing, cyanosis; no edema  SKIN: clear without significant rashes or lesions -pacemeker site ok.  NEURO: Normal strength and tone, sensory exam grossly normal, mentation intact and speech normal    Unresulted Labs Ordered in the Past 30 Days of this Admission     Date and Time Order Name Status Description    4/25/2018 2201 Blood culture Preliminary     4/25/2018 2201 Blood culture Preliminary           Recent Results (from the past 24 hour(s))   X-ray Chest 2 vws*    Narrative    CHEST TWO VIEWS  4/28/2018 8:08 AM     COMPARISON: Frontal chest x-ray 4/26/2018.    HISTORY: Post cardiac implant. Check for pneumothorax and lead  position.     FINDINGS: There is a dual-lead pacemaker module implanted over the  left chest with the leads in the right atrium and right ventricle.    The cardiac silhouette, pulmonary vasculature, lungs and pleural  spaces are within normal limits.      Impression    IMPRESSION: Clear lungs. New dual-lead pacemaker. No pneumothorax.              Pending Tests at Discharge:   None         Discharge Instructions and Follow-Up:   Discharge diet: Regular   Discharge activity: Activity as tolerated   Discharge follow-up: F/u neuro and cards as schediled/ recommended           Discharge Disposition:   Discharged to home      Attestation:  I have reviewed today's vital signs, notes, medications, labs and imaging.    Time Spent on this Encounter   I, Davon Jeong, personally saw the patient today and spent greater than 30 minutes discharging this patient.    Davon Jeong MD

## 2018-04-28 NOTE — DISCHARGE INSTRUCTIONS
Hospital follow up appointment has been made for you with Dr. Cadena on Monday May 7th at 2:20pm  Please call if you need to change appointment [phone].  A follow-up appointment was scheduled for you [see above].  It is very important to go to it--bring these papers and your insurance card with you.  At the visit, talk about your hospital stay.  Tell your doctor how you feel.  Show your new list of medications.  Your doctor will update records, make sure you are still doing OK, and decide if any tests or medication changes are needed.        Please follow up with neurology in 2 weeks. You may call any of the following neurology clinics for appointment. Tell them you were hospitalized and need follow up as recommended at discharge.    1. [facility]   [address] , [address]   [place] [address]   [phone]    You need to call this neurology office next week to set up a hospital follow up appointment. ( see above number) you need to be seen in 2 weeks.     [facility] will call you to set up follow up for your pacemaker check: if you do not hear from them by early this week please call them to set this up: phone : [phone].    [facility] phone [phone] if you do not hear from them.

## 2018-04-30 ENCOUNTER — TELEPHONE (OUTPATIENT)
Dept: CARDIOLOGY | Facility: CLINIC | Age: 83
End: 2018-04-30

## 2018-04-30 ENCOUNTER — CARE COORDINATION (OUTPATIENT)
Dept: CARDIOLOGY | Facility: CLINIC | Age: 83
End: 2018-04-30

## 2018-04-30 LAB — INTERPRETATION ECG - MUSE: NORMAL

## 2018-04-30 NOTE — PROGRESS NOTES
Patient was evaluated by cardiology while inpatient for AV conduction disease PM placed while inpatient . Called patient to discuss any post hospital d/c questions she may have, review medication changes, and confirm f/u appts.Patient denied any questions regarding new medications or changes to some of her current medications that she was taking prior to admission. Patient denied any questions or concerns regarding her incision. Patient denied any light headedness, dizziness, or syncope. RN confirmed with patient that we would like her to schedule f/u apt with out device clinic. Patient advised to call clinic with any cardiac related questions or concerns prior. Patient verbalized understanding and agreed with plan. RN transferred patient to scheduled to arrange f/u apt with device clinic.

## 2018-04-30 NOTE — TELEPHONE ENCOUNTER
Post device implant discharge phone call.    Reviewed the following:  No raising arm above shoulder on the side of implant for 3 weeks  Remove outer dressing 3 days after implant. May shower after outer dressing removed. Leave steri-strips in place, will be removed at 1 week device check  Watch for redness, drainage, warmth, or fever. Call device clinic if any signs of infection.     1 week device check scheduled:  Friday May 4th at 3pm. I spoke with pt's Anette kowalski/     Pt states understanding of all instructions. sml

## 2018-05-02 LAB
BACTERIA SPEC CULT: NO GROWTH
BACTERIA SPEC CULT: NO GROWTH
Lab: NORMAL
Lab: NORMAL
SPECIMEN SOURCE: NORMAL
SPECIMEN SOURCE: NORMAL

## 2018-05-03 LAB — INTERPRETATION ECG - MUSE: NORMAL

## 2018-05-04 ENCOUNTER — ALLIED HEALTH/NURSE VISIT (OUTPATIENT)
Dept: CARDIOLOGY | Facility: CLINIC | Age: 83
End: 2018-05-04
Payer: COMMERCIAL

## 2018-05-04 DIAGNOSIS — Z95.0 CARDIAC PACEMAKER IN SITU: Primary | ICD-10-CM

## 2018-05-04 PROCEDURE — 93280 PM DEVICE PROGR EVAL DUAL: CPT | Performed by: INTERNAL MEDICINE

## 2018-05-04 NOTE — PROGRESS NOTES
Jorge Moffett 7-10 day Post Pacemaker Device Check  AP: 3 % : 90 %  Mode: DDD 60        Underlying Rhythm: Mobitz II HB  Heart Rate: Stable with adequate variability. Presents in W/C.  Sensing: Stable    Pacing Threshold: Stable    Impedance: Stable  Battery Status: 8 yrs 7 months  Incision/Complications: Steri strips gently removed. Edges of incision well approximated without signs of infection. Reminded to not raise left arm above shoulder height x 2 more weeks.  Atrial Arrhythmia: None  Ventricular Arrhythmia: None  Setting Change: None    Care Plan: Pt has no complaints. Accompanied by meghana. Has HM set up at bedside. 6 week check scheduled for June and 3 month post PPM implant EP NP/PA order entered. Directed to scheduling at time of discharge. AMARI Baltazar RN.

## 2018-05-04 NOTE — MR AVS SNAPSHOT
After Visit Summary   5/4/2018    Sheila Johnson    MRN: [id number]           Patient Information     Date Of Birth          2/13/1924        Visit Information        Provider Department      5/4/2018 3:00 PM BAEZ MYRNA2 [facility]        Today's Diagnoses     Cardiac pacemaker in situ    -  1       Follow-ups after your visit        Additional Services     Follow-Up with Cardiac Advanced Practice Provider                 Your next 10 appointments already scheduled     Jun 14, 2018  2:00 PM CDT   Pacemaker Check with BAEZ DCR2   [facility] ([facility])    [address]  [place] [address] [phone] OPT 2            Aug 06, 2018  2:30 PM CDT   Return Visit with BELLO Huber CNP   [facility] ([facility])    [id number]  [place] [address] [phone] OPT 2              Future tests that were ordered for you today     Open Future Orders        Priority Expected Expires Ordered    Follow-Up with Cardiac Advanced Practice Provider Routine 8/6/2018 9/16/2019 5/4/2018            Who to contact     If you have questions or need follow up information about today's clinic visit or your schedule please contact [facility] directly at [phone].  Normal or non-critical lab and imaging results will be communicated to you by MyChart, letter or phone within 4 business days after the clinic has received the results. If you do not hear from us within 7 days, please contact the clinic through MyChart or phone. If you have a critical or abnormal lab result, we will notify you by phone as soon as possible.  Submit refill requests through [employer] or call your pharmacy and they will forward the refill request to us. Please allow 3 business days for your refill to be completed.           "Additional Information About Your Visit        MyChart Information     [employer] lets you send messages to your doctor, view your test results, renew your prescriptions, schedule appointments and more. To sign up, go to [email or web address] . Click on \"Log in\" on the left side of the screen, which will take you to the Welcome page. Then click on \"Sign up Now\" on the right side of the page.     You will be asked to enter the access code listed below, as well as some personal information. Please follow the directions to create your username and password.     Your access code is: [email or web address]  Expires: 2018 10:21 AM     Your access code will  in 90 days. If you need help or a new code, please call your [facility] clinic or [phone].        Care EveryWhere ID     This is your Care EveryWhere ID. This could be used by other organizations to access your [facility] medical records  [id number]         Blood Pressure from Last 3 Encounters:   18 159/73   10/19/17 164/71   10/04/17 160/79    Weight from Last 3 Encounters:   18 99.8 kg (220 lb)   13 107.1 kg (236 lb 1.8 oz)              We Performed the Following     PM DEVICE PROGRAMMING EVAL, DUAL LEAD PACER ([id number])        Primary Care Provider Office Phone # Fax #    Thania Cadena [phone]       [facility] [address] [address]  [place] [address]        Equal Access to Services     [facility] Center[facility] AH: Hadii aad ku hadasho Soomaali, waaxda luqadaha, qaybta kaalmada adeegyada, waxay kaleigh guzman. So [facility] [phone].    ATENCIÓN: Si habla español, tiene a junior disposición servicios gratuitos de asistencia lingüística. Llame al [phone].    We comply with applicable federal civil rights laws and [place] laws. We do not discriminate on the basis of race, color, national origin, age, disability, sex, sexual orientation, or gender identity.            Thank you!     Thank you for choosing " [facility]  for your care. Our goal is always to provide you with excellent care. Hearing back from our patients is one way we can continue to improve our services. Please take a few minutes to complete the written survey that you may receive in the mail after your visit with us. Thank you!             Your Updated Medication List - Protect others around you: Learn how to safely use, store and throw away your medicines at www.disposemymeds.org.          This list is accurate as of 5/4/18  3:44 PM.  Always use your most recent med list.                   Brand Name Dispense Instructions for use Diagnosis    ATORVASTATIN CALCIUM PO      Take 40 mg by mouth daily.        CITALOPRAM HYDROBROMIDE PO      Take 20 mg by mouth daily.        clopidogrel 75 MG tablet    PLAVIX     Take 75 mg by mouth daily        fluticasone 50 MCG/ACT spray    FLONASE     Spray 2 sprays into both nostrils daily as needed for rhinitis or allergies        furosemide 20 MG tablet    LASIX     Take 20 mg by mouth daily        LEVEMIR FLEXPEN/FLEXTOUCH 100 UNIT/ML injection   Generic drug:  insulin detemir      Inject 44 Units Subcutaneous every morning        menthol-zinc oxide 0.44-20.625 % Oint ointment    CALMOSEPTINE     Apply topically 2 times daily as needed for skin protection        order for DME     3 Device    [employer] Phone #[phone] Fax #[phone] Ready To Wear Knee High Compression Stockings 20-30 mmHg  May need carlene and liner  Length of Need: Life Time # of Pairs 3    Open wound of knee, leg, and ankle, unspecified laterality, subsequent encounter, Swelling of limb, Localized edema, Lymphedema of both lower extremities, Chronic ulcer of ankle, unspecified laterality, 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), Venous ulcer of ankle, unspecified laterality (H)       SINGULAIR PO      Take 10 mg by mouth At Bedtime        TYLENOL PO      Take 1,000 mg by mouth every morning  Has stopped taking since Mon, Feb 11th or Tues, Feb 12th, 2013 when she started taking Vicodin

## 2018-05-23 ENCOUNTER — APPOINTMENT (OUTPATIENT)
Dept: CT IMAGING | Facility: CLINIC | Age: 83
End: 2018-05-23
Attending: EMERGENCY MEDICINE
Payer: MEDICARE

## 2018-05-23 ENCOUNTER — APPOINTMENT (OUTPATIENT)
Dept: GENERAL RADIOLOGY | Facility: CLINIC | Age: 83
End: 2018-05-23
Attending: EMERGENCY MEDICINE
Payer: MEDICARE

## 2018-05-23 ENCOUNTER — HOSPITAL ENCOUNTER (OUTPATIENT)
Facility: CLINIC | Age: 83
Setting detail: OBSERVATION
Discharge: HOME | End: 2018-05-24
Attending: EMERGENCY MEDICINE | Admitting: HOSPITALIST
Payer: MEDICARE

## 2018-05-23 DIAGNOSIS — M62.830 BACK MUSCLE SPASM: ICD-10-CM

## 2018-05-23 DIAGNOSIS — M54.50 ACUTE LEFT-SIDED LOW BACK PAIN WITHOUT SCIATICA: Primary | ICD-10-CM

## 2018-05-23 DIAGNOSIS — R09.02 HYPOXIA: ICD-10-CM

## 2018-05-23 DIAGNOSIS — K59.00 CONSTIPATION, UNSPECIFIED CONSTIPATION TYPE: ICD-10-CM

## 2018-05-23 DIAGNOSIS — R10.9 FLANK PAIN: ICD-10-CM

## 2018-05-23 LAB
ALBUMIN UR-MCNC: 30 MG/DL
ANION GAP SERPL CALCULATED.3IONS-SCNC: 8 MMOL/L (ref 3–14)
APPEARANCE UR: CLEAR
BASOPHILS # BLD AUTO: 0.1 10E9/L (ref 0–0.2)
BASOPHILS NFR BLD AUTO: 0.6 %
BILIRUB UR QL STRIP: NEGATIVE
BUN SERPL-MCNC: 27 MG/DL (ref 7–30)
CALCIUM SERPL-MCNC: 8.7 MG/DL (ref 8.5–10.1)
CHLORIDE SERPL-SCNC: 101 MMOL/L (ref 94–109)
CO2 SERPL-SCNC: 28 MMOL/L (ref 20–32)
COLOR UR AUTO: ABNORMAL
CREAT SERPL-MCNC: 0.98 MG/DL (ref 0.52–1.04)
DIFFERENTIAL METHOD BLD: ABNORMAL
EOSINOPHIL # BLD AUTO: 0.5 10E9/L (ref 0–0.7)
EOSINOPHIL NFR BLD AUTO: 4.5 %
ERYTHROCYTE [DISTWIDTH] IN BLOOD BY AUTOMATED COUNT: 13.2 % (ref 10–15)
GFR SERPL CREATININE-BSD FRML MDRD: 53 ML/MIN/1.7M2
GLUCOSE BLDC GLUCOMTR-MCNC: 184 MG/DL (ref 70–99)
GLUCOSE BLDC GLUCOMTR-MCNC: 259 MG/DL (ref 70–99)
GLUCOSE SERPL-MCNC: 198 MG/DL (ref 70–99)
GLUCOSE UR STRIP-MCNC: NEGATIVE MG/DL
HBA1C MFR BLD: 10.1 % (ref 0–5.6)
HCT VFR BLD AUTO: 36.6 % (ref 35–47)
HGB BLD-MCNC: 12.1 G/DL (ref 11.7–15.7)
HGB UR QL STRIP: NEGATIVE
HYALINE CASTS #/AREA URNS LPF: 1 /LPF (ref 0–2)
IMM GRANULOCYTES # BLD: 0 10E9/L (ref 0–0.4)
IMM GRANULOCYTES NFR BLD: 0.3 %
KETONES UR STRIP-MCNC: NEGATIVE MG/DL
LEUKOCYTE ESTERASE UR QL STRIP: ABNORMAL
LYMPHOCYTES # BLD AUTO: 2.8 10E9/L (ref 0.8–5.3)
LYMPHOCYTES NFR BLD AUTO: 23.6 %
MCH RBC QN AUTO: 29.2 PG (ref 26.5–33)
MCHC RBC AUTO-ENTMCNC: 33.1 G/DL (ref 31.5–36.5)
MCV RBC AUTO: 88 FL (ref 78–100)
MONOCYTES # BLD AUTO: 1.1 10E9/L (ref 0–1.3)
MONOCYTES NFR BLD AUTO: 9.7 %
NEUTROPHILS # BLD AUTO: 7.2 10E9/L (ref 1.6–8.3)
NEUTROPHILS NFR BLD AUTO: 61.3 %
NITRATE UR QL: NEGATIVE
NRBC # BLD AUTO: 0 10*3/UL
NRBC BLD AUTO-RTO: 0 /100
PH UR STRIP: 7.5 PH (ref 5–7)
PLATELET # BLD AUTO: 307 10E9/L (ref 150–450)
POTASSIUM SERPL-SCNC: 4.3 MMOL/L (ref 3.4–5.3)
RBC # BLD AUTO: 4.15 10E12/L (ref 3.8–5.2)
RBC #/AREA URNS AUTO: <1 /HPF (ref 0–2)
SODIUM SERPL-SCNC: 137 MMOL/L (ref 133–144)
SOURCE: ABNORMAL
SP GR UR STRIP: 1.03 (ref 1–1.03)
SQUAMOUS #/AREA URNS AUTO: 2 /HPF (ref 0–1)
UROBILINOGEN UR STRIP-MCNC: NORMAL MG/DL (ref 0–2)
WBC # BLD AUTO: 11.7 10E9/L (ref 4–11)
WBC #/AREA URNS AUTO: 3 /HPF (ref 0–5)

## 2018-05-23 PROCEDURE — 25000125 ZZHC RX 250: Performed by: EMERGENCY MEDICINE

## 2018-05-23 PROCEDURE — 96372 THER/PROPH/DIAG INJ SC/IM: CPT

## 2018-05-23 PROCEDURE — 83036 HEMOGLOBIN GLYCOSYLATED A1C: CPT | Performed by: EMERGENCY MEDICINE

## 2018-05-23 PROCEDURE — G0378 HOSPITAL OBSERVATION PER HR: HCPCS

## 2018-05-23 PROCEDURE — 71046 X-RAY EXAM CHEST 2 VIEWS: CPT

## 2018-05-23 PROCEDURE — 00000146 ZZHCL STATISTIC GLUCOSE BY METER IP

## 2018-05-23 PROCEDURE — 99220 ZZC INITIAL OBSERVATION CARE,LEVL III: CPT | Performed by: HOSPITALIST

## 2018-05-23 PROCEDURE — A9270 NON-COVERED ITEM OR SERVICE: HCPCS | Mod: GY | Performed by: HOSPITALIST

## 2018-05-23 PROCEDURE — 25000131 ZZH RX MED GY IP 250 OP 636 PS 637: Mod: GY | Performed by: HOSPITALIST

## 2018-05-23 PROCEDURE — 25000132 ZZH RX MED GY IP 250 OP 250 PS 637: Mod: GY | Performed by: EMERGENCY MEDICINE

## 2018-05-23 PROCEDURE — 74177 CT ABD & PELVIS W/CONTRAST: CPT

## 2018-05-23 PROCEDURE — 81001 URINALYSIS AUTO W/SCOPE: CPT | Performed by: EMERGENCY MEDICINE

## 2018-05-23 PROCEDURE — 99285 EMERGENCY DEPT VISIT HI MDM: CPT | Mod: 25

## 2018-05-23 PROCEDURE — 85025 COMPLETE CBC W/AUTO DIFF WBC: CPT | Performed by: EMERGENCY MEDICINE

## 2018-05-23 PROCEDURE — 80048 BASIC METABOLIC PNL TOTAL CA: CPT | Performed by: EMERGENCY MEDICINE

## 2018-05-23 PROCEDURE — 25000128 H RX IP 250 OP 636: Performed by: EMERGENCY MEDICINE

## 2018-05-23 PROCEDURE — 25000132 ZZH RX MED GY IP 250 OP 250 PS 637: Mod: GY | Performed by: HOSPITALIST

## 2018-05-23 PROCEDURE — A9270 NON-COVERED ITEM OR SERVICE: HCPCS | Mod: GY | Performed by: EMERGENCY MEDICINE

## 2018-05-23 RX ORDER — MONTELUKAST SODIUM 10 MG/1
10 TABLET ORAL AT BEDTIME
Status: DISCONTINUED | OUTPATIENT
Start: 2018-05-23 | End: 2018-05-24 | Stop reason: HOSPADM

## 2018-05-23 RX ORDER — CYCLOBENZAPRINE HCL 5 MG
5 TABLET ORAL 3 TIMES DAILY PRN
Status: DISCONTINUED | OUTPATIENT
Start: 2018-05-23 | End: 2018-05-24 | Stop reason: HOSPADM

## 2018-05-23 RX ORDER — AMOXICILLIN 250 MG
1 CAPSULE ORAL 2 TIMES DAILY PRN
Status: DISCONTINUED | OUTPATIENT
Start: 2018-05-23 | End: 2018-05-24 | Stop reason: HOSPADM

## 2018-05-23 RX ORDER — CLOPIDOGREL BISULFATE 75 MG/1
75 TABLET ORAL DAILY
Status: DISCONTINUED | OUTPATIENT
Start: 2018-05-23 | End: 2018-05-24 | Stop reason: HOSPADM

## 2018-05-23 RX ORDER — ACETAMINOPHEN 325 MG/1
650 TABLET ORAL ONCE
Status: DISCONTINUED | OUTPATIENT
Start: 2018-05-23 | End: 2018-05-23

## 2018-05-23 RX ORDER — LIDOCAINE 4 G/G
1 PATCH TOPICAL
Status: DISCONTINUED | OUTPATIENT
Start: 2018-05-23 | End: 2018-05-23

## 2018-05-23 RX ORDER — LIDOCAINE 4 G/G
1 PATCH TOPICAL ONCE
Status: COMPLETED | OUTPATIENT
Start: 2018-05-23 | End: 2018-05-23

## 2018-05-23 RX ORDER — ACETAMINOPHEN 325 MG/1
975 TABLET ORAL ONCE
Status: COMPLETED | OUTPATIENT
Start: 2018-05-23 | End: 2018-05-23

## 2018-05-23 RX ORDER — NALOXONE HYDROCHLORIDE 0.4 MG/ML
.1-.4 INJECTION, SOLUTION INTRAMUSCULAR; INTRAVENOUS; SUBCUTANEOUS
Status: DISCONTINUED | OUTPATIENT
Start: 2018-05-23 | End: 2018-05-24 | Stop reason: HOSPADM

## 2018-05-23 RX ORDER — DEXTROSE MONOHYDRATE 25 G/50ML
25-50 INJECTION, SOLUTION INTRAVENOUS
Status: DISCONTINUED | OUTPATIENT
Start: 2018-05-23 | End: 2018-05-24 | Stop reason: HOSPADM

## 2018-05-23 RX ORDER — LIDOCAINE 40 MG/G
CREAM TOPICAL
Status: DISCONTINUED | OUTPATIENT
Start: 2018-05-23 | End: 2018-05-24 | Stop reason: HOSPADM

## 2018-05-23 RX ORDER — ACETAMINOPHEN 500 MG
1000 TABLET ORAL 3 TIMES DAILY
Status: DISCONTINUED | OUTPATIENT
Start: 2018-05-23 | End: 2018-05-24 | Stop reason: HOSPADM

## 2018-05-23 RX ORDER — ONDANSETRON 4 MG/1
4 TABLET, ORALLY DISINTEGRATING ORAL EVERY 6 HOURS PRN
Status: DISCONTINUED | OUTPATIENT
Start: 2018-05-23 | End: 2018-05-24 | Stop reason: HOSPADM

## 2018-05-23 RX ORDER — LIDOCAINE 4 G/G
1 PATCH TOPICAL
Status: DISCONTINUED | OUTPATIENT
Start: 2018-05-24 | End: 2018-05-24 | Stop reason: HOSPADM

## 2018-05-23 RX ORDER — AMOXICILLIN 250 MG
2 CAPSULE ORAL 2 TIMES DAILY PRN
Status: DISCONTINUED | OUTPATIENT
Start: 2018-05-23 | End: 2018-05-24 | Stop reason: HOSPADM

## 2018-05-23 RX ORDER — POLYETHYLENE GLYCOL 3350 17 G/17G
17 POWDER, FOR SOLUTION ORAL DAILY PRN
Status: DISCONTINUED | OUTPATIENT
Start: 2018-05-23 | End: 2018-05-24 | Stop reason: HOSPADM

## 2018-05-23 RX ORDER — IOPAMIDOL 755 MG/ML
111 INJECTION, SOLUTION INTRAVASCULAR ONCE
Status: COMPLETED | OUTPATIENT
Start: 2018-05-23 | End: 2018-05-23

## 2018-05-23 RX ORDER — FUROSEMIDE 20 MG
20 TABLET ORAL DAILY
Status: DISCONTINUED | OUTPATIENT
Start: 2018-05-23 | End: 2018-05-24 | Stop reason: HOSPADM

## 2018-05-23 RX ORDER — ONDANSETRON 2 MG/ML
4 INJECTION INTRAMUSCULAR; INTRAVENOUS EVERY 6 HOURS PRN
Status: DISCONTINUED | OUTPATIENT
Start: 2018-05-23 | End: 2018-05-24 | Stop reason: HOSPADM

## 2018-05-23 RX ORDER — NICOTINE POLACRILEX 4 MG
15-30 LOZENGE BUCCAL
Status: DISCONTINUED | OUTPATIENT
Start: 2018-05-23 | End: 2018-05-24 | Stop reason: HOSPADM

## 2018-05-23 RX ADMIN — MONTELUKAST 10 MG: 10 TABLET, FILM COATED ORAL at 22:06

## 2018-05-23 RX ADMIN — LIDOCAINE 1 PATCH: 560 PATCH PERCUTANEOUS; TOPICAL; TRANSDERMAL at 11:59

## 2018-05-23 RX ADMIN — CYCLOBENZAPRINE HYDROCHLORIDE 5 MG: 5 TABLET, FILM COATED ORAL at 22:06

## 2018-05-23 RX ADMIN — INSULIN ASPART 2 UNITS: 100 INJECTION, SOLUTION INTRAVENOUS; SUBCUTANEOUS at 18:25

## 2018-05-23 RX ADMIN — IOPAMIDOL 111 ML: 755 INJECTION, SOLUTION INTRAVENOUS at 10:04

## 2018-05-23 RX ADMIN — OXYCODONE HYDROCHLORIDE 2.5 MG: 5 TABLET ORAL at 17:02

## 2018-05-23 RX ADMIN — CLOPIDOGREL 75 MG: 75 TABLET, FILM COATED ORAL at 17:02

## 2018-05-23 RX ADMIN — FUROSEMIDE 20 MG: 20 TABLET ORAL at 17:02

## 2018-05-23 RX ADMIN — ACETAMINOPHEN 975 MG: 325 TABLET, FILM COATED ORAL at 09:27

## 2018-05-23 RX ADMIN — ACETAMINOPHEN 1000 MG: 500 TABLET, FILM COATED ORAL at 20:14

## 2018-05-23 RX ADMIN — SODIUM CHLORIDE 74 ML: 9 INJECTION, SOLUTION INTRAVENOUS at 10:04

## 2018-05-23 ASSESSMENT — ENCOUNTER SYMPTOMS
WEAKNESS: 0
DIARRHEA: 0
NAUSEA: 0
BLOOD IN STOOL: 0
VOMITING: 0
HEMATURIA: 0
SHORTNESS OF BREATH: 0
FLANK PAIN: 1
DYSURIA: 0
NUMBNESS: 0

## 2018-05-23 NOTE — PROGRESS NOTES
RECEIVING UNIT ED HANDOFF REVIEW    ED Nurse Handoff Report was reviewed by: Erich López on May 23, 2018 at 2:49 PM

## 2018-05-23 NOTE — ED PROVIDER NOTES
History     Chief Complaint:  Flank pain    HPI   Sheila Johnson is a 89 year old female with a history of stroke and pacemaker implant who presents with left-sided flank pain.  The patient says that she began having left-sided flank pain a couple days ago, which has gotten progressively worse.  She describes the flank pain as sharp and constant, but nothing provokes it.  Therefore, the patient presents to the ED for further evaluation today.  Here in the ED, the patient says that she has had flank pain like this in the past, but the flank pain today is worse than previous episodes.  The patient denies nausea, vomiting, diarrhea, chest pain, shortness of breath, numbness, tingling, weakness, dysuria, blood in urine, changes in bowel movements, or blood in stool.  Of note, the patient has not taken any medications for pain prior to calling EMS.    Allergies:  No known drug allergies     Medications:    Acetaminophen (TYLENOL PO)  ATORVASTATIN   CITALOPRAM HYDROBROMIDE   clopidogrel (PLAVIX)   fluticasone (FLONASE)   furosemide (LASIX)   menthol-zinc oxide (CALMOSEPTINE)   Montelukast Sodium (SINGULAIR PO)    Past Medical History:    Stroke  Arthritis   Depressive disorder   Diabetes mellitus (H)   Hypercholesteraemia   Hypertension   Aphasia  Back pain    Past Surgical History:    Pace maker implant  Release carpal tunnel    Family History:    History reviewed. No pertinent family history.     Social History:  Smoking Status: Former Smoker  Alcohol Use: No  Patient presents with family.  Marital Status:   [5]    Review of Systems   Respiratory: Negative for shortness of breath.    Cardiovascular: Negative for chest pain.   Gastrointestinal: Negative for blood in stool, diarrhea, nausea and vomiting.   Genitourinary: Positive for flank pain. Negative for dysuria and hematuria.   Neurological: Negative for weakness and numbness.   All other systems reviewed and are negative.      Physical Exam     Patient  "Vitals for the past 24 hrs:   BP Temp Temp src Pulse Resp SpO2 Height Weight   05/23/18 1515 171/76 - - - - 99 % - -   05/23/18 1445 160/76 - - - - 97 % - -   05/23/18 1400 171/70 - - - - 96 % - -   05/23/18 1300 - - - - - 91 % - -   05/23/18 1245 - - - - - 90 % - -   05/23/18 1230 124/57 - - - - 91 % - -   05/23/18 1215 129/62 - - - - 92 % - -   05/23/18 1200 123/74 - - - - - - -   05/23/18 1145 148/68 - - - - 93 % - -   05/23/18 1130 147/68 - - - - 96 % - -   05/23/18 1115 141/65 - - - - 96 % - -   05/23/18 1045 - - - - - 95 % - -   05/23/18 1030 - - - - - 97 % - -   05/23/18 1015 - - - - - 97 % - -   05/23/18 0945 - - - - - 97 % - -   05/23/18 0930 - - - - - 96 % - -   05/23/18 0915 - - - - - 95 % - -   05/23/18 0900 - - - - - 95 % - -   05/23/18 0845 - - - - - 94 % - -   05/23/18 0839 169/81 97.4  F (36.3  C) Oral 61 16 (!) 80 % 1.448 m (4' 9\") 99.8 kg (220 lb)     Physical Exam  Physical Exam   General: Resting on the bed.  Ears, Nose, Throat:  External ears normal.  Nose normal.    Eyes:  Conjunctivae clear.  Pupils are equal, round, and reactive.   Neck: Normal range of motion.  Neck supple.   CV: Regular rate and rhythm.  No murmurs.      Respiratory: Effort normal and breath sounds normal.  No wheezing or crackles.   Gastrointestinal: Soft.  No distension. There is no tenderness.    Musculoskeletal: left low back tenderness.  No midline T L spine pain.   Neuro: Alert. Moving all extremities appropriately.  Normal speech.  Gross muscle strength intact of the proximal and distal bilateral upper and lower extremities.  Sensation intact to light touch in all 4 extremities.  Skin: Skin is warm and dry.  No rash noted.     Emergency Department Course     Imaging:  Radiographic findings were communicated with the patient who voiced understanding of the findings.  X-ray chest, 2 views:  Left subclavian cardiac device is in place. The cardiac  size is at the upper limits of normal and stable. No evidence " of  pneumothorax. There are no acute infiltrates.  Result per radiology.     CT Abdomen/Pelvis with IV contrast:   Colonic diverticulosis with no evidence of diverticulitis.  No acute abnormality is seen.    As per radiology.    Laboratory:  UA with micro: PH urine 7.5 high, protein albumin urine 30, leukocyte esterase urine small, squamous epithelial/hpf urine 2 high o/w negative  CBC: WBC: 11.7 high, HGB: 12.1, PLT: 307  BMP: Glucose 198 high, GFR estimate 53 low, o/w WNL (Creatinine: 0.98)    Interventions:  927 acetaminophen 975 mg p.o.  1159 lidocaine patch 1 patch transdermal    Emergency Department Course:  Nursing notes and vitals reviewed. 0912 I performed an exam of the patient as documented above.     IV inserted. Medicine administered as documented above. Blood drawn. This was sent to the lab for further testing, results above.    The patient provided a urine sample here in the emergency department. This was sent for laboratory testing, findings above.     The patient was sent for a XR and CT while in the emergency department, findings above.     1110 I rechecked the patient and discussed the results of her workup thus far.     1413  I consulted with Dr. Mendoza of the hospitalist services. They are in agreement to accept the patient for admission.    Findings and plan explained to the Patient who consents to admission. Discussed the patient with Dr. Mendoza, who will admit the patient to a obs bed for further monitoring, evaluation, and treatment.      Impression & Plan      Medical Decision Makin year old female with a history of diabetes who presents with flank pain.  Vital signs reviewed and are unremarkable.  Broad differential pursued including but not limited to pyelonephritis, renal colic, compression fracture, muscle spasm strain, diverticulitis, colitis, etc.  Overall patient's well-appearing and nontoxic.  Flank pain is localized to her left low back.  There is no signs of neurologic  injury.  No evidence of epidural abscess or hematoma.  Urinalysis is bland without any evidence of infection not concerning for Gama or renal colic.  CBC with mild leukocytosis, suspect this is stress demargination not concerning for infection.  BMP shows no acute electrolyte metabolic or renal adenoma.  CT shows diverticulosis without diverticulitis.  There is no evidence of bony abnormality.  She has no midline T-spine tenderness or L-spine tenderness.  Pain is reproducible to the left lower back.  Any movement or change in position recreates pain as well.  Suspect is most likely muscle spasm strain.  Was able to control pain with Tylenol and lidocaine patch.  Unfortunately she remained persistently hypoxic.  Hypoxia is only after narcotics.  Suspect this is secondary to opiates and obesity hypoventilation syndrome.  Chest x-ray is obtained showing no evidence of pneumonia and effusion or infiltrates.  She denies any chest pain or shortness breath.  Given that she is asymptomatic with otherwise reassuring vital signs low suspicion for PE or other acute pathology driving this etiology.  Suspect she needs more time and mobilization to clear the narcotics.  Given that she is requiring continued oxygen and did not feel that she is appropriate for discharge home.  Patient will be admitted to observation for continued management and work up as required.  Graciously accepted by hospitalist.      Diagnosis:    ICD-10-CM    1. Hypoxia R09.02    2. Flank pain R10.9    3. Back muscle spasm M62.830    4. Acute left-sided low back pain without sciatica M54.5        Disposition:  Admitted to obs.    Mau Florentino  5/23/2018    EMERGENCY DEPARTMENT  Mau MEI am serving as a scribe at 9:12 AM on 5/23/2018 to document services personally performed by Olena Castorena MD based on my observations and the provider's statements to me.        Olena Castorena MD  05/23/18 1804       Olena Castorena MD  05/23/18  1904

## 2018-05-23 NOTE — IP AVS SNAPSHOT
MRN:[id number]                      After Visit Summary   5/23/2018    Sheila Johnson    MRN: [id number]           Thank you!     Thank you for choosing [facility] for your care. Our goal is always to provide you with excellent care. Hearing back from our patients is one way we can continue to improve our services. Please take a few minutes to complete the written survey that you may receive in the mail after you visit with us. Thank you!        Patient Information     Date Of Birth          2/13/1924        About your hospital stay     You were admitted on:  May 23, 2018 You last received care in the:  [facility] Observation Unit    You were discharged on:  May 24, 2018        Reason for your hospital stay       You were hospitalized for further evaluation and treatment of acute left-sided back pain, likely musculoskeletal strain.                  Who to Call     For medical emergencies, please call 911.  For non-urgent questions about your medical care, please call your primary care provider or clinic, [phone]          Attending Provider     Provider Specialty    Olena Castorena MD Emergency Medicine    [facility] Internal Medicine       Primary Care Provider Office Phone # Fax #    Thania Cadena [phone]      After Care Instructions     Activity       Your activity upon discharge: activity as tolerated, use walker at all times.            Diet       Follow this diet upon discharge: Resume home diet.            Discharge Instructions       1) Follow-up with your primary care provider in the next week to discuss hospitalization   2) Pain control with tylenol 1000 mg by mouth three times daily and lidoderm patches, prescriptions at discharge   3) Heat and ice for pain control   4) Continue with home physical therapy   5) Stool softener prescribed at discharge  6) Consider outpatient sleep study due to drop in oxygen levels at times when you sleep                 "  Follow-up Appointments     Follow-up and recommended labs and tests        Follow up with primary care provider, Thania Cadena, within 7 days for hospital follow- up.  No follow up labs or test are needed.                  Your next 10 appointments already scheduled     Jun 14, 2018  2:00 PM CDT   Pacemaker Check with BAEZ DCR2   [facility]    [address]  [place] [facility] [place] [id number] [phone] OPT 2            Aug 06, 2018  2:30 PM CDT   Return Visit with BELLO Huber CNP   [facility])    [address]  [place] [id number] [phone] OPT 2              Additional Services     Home Care PT Referral for Hospital Discharge       PT to eval and treat    RESUME HOME PT    Your provider has ordered home care - physical therapy. If you have not been contacted within 2 days of your discharge please call the department phone number listed on the top of this document.                  Pending Results     No orders found for last 3 day(s).            Statement of Approval     Ordered          05/24/18 1350  I have reviewed and agree with all the recommendations and orders detailed in this document.  EFFECTIVE NOW     Approved and electronically signed by:  Argenis Mendiola PA-C             Admission Information     Date & Time Provider Department Dept. Phone    5/23/2018 Jet Mendoza MD [facility] Unit [phone]      Your Vitals Were     Blood Pressure Pulse Temperature Respirations Height Weight    169/66 68 96.4  F (35.8  C) (Oral) 16 1.448 m (4' 9\") 99.8 kg (220 lb)    Pulse Oximetry BMI (Body Mass Index)                94% 47.61 kg/m2          MyChart Information     [employer] lets you send messages to your doctor, view your test results, renew your prescriptions, schedule appointments and more. To sign up, go to " "[email or web address] . Click on \"Log in\" on the left side of the screen, which will take you to the Welcome page. Then click on \"Sign up Now\" on the right side of the page.     You will be asked to enter the access code listed below, as well as some personal information. Please follow the directions to create your username and password.     Your access code is: [email or web address]  Expires: 2018 10:21 AM     Your access code will  in 90 days. If you need help or a new code, please call your [facility] clinic or [phone].        Care EveryWhere ID     This is your Care EveryWhere ID. This could be used by other organizations to access your [facility] medical records  [id number]        Equal Access to Services     STEVIE BENNETT : Vin Figueredo, baudilio lundberg, madeline mccoy, rhina mclean . So [facility] [phone].    ATENCIÓN: Si habla español, tiene a junior disposición servicios gratuitos de asistencia lingüística. Rosina al [phone].    We comply with applicable federal civil rights laws and [place] laws. We do not discriminate on the basis of race, color, national origin, age, disability, sex, sexual orientation, or gender identity.               Review of your medicines      START taking        Dose / Directions    Lidocaine 4 % Patch   Commonly known as:  LIDOCARE   Used for:  Acute left-sided low back pain without sciatica   Notes to Patient:  12 hours on. 12 hours off.        Dose:  1 patch   Place 1 patch onto the skin every 24 hours   Quantity:  30 patch   Refills:  0       senna-docusate 8.6-50 MG per tablet   Commonly known as:  SENOKOT-S;PERICOLACE   Used for:  Constipation, unspecified constipation type        Dose:  1 tablet   Take 1 tablet by mouth 2 times daily   Quantity:  100 tablet   Refills:  0         CONTINUE these medicines which may have CHANGED, or have new prescriptions. If we are uncertain of the size of tablets/capsules you " have at home, strength may be listed as something that might have changed.        Dose / Directions    acetaminophen 500 MG tablet   Commonly known as:  TYLENOL   This may have changed:    - medication strength  - when to take this   Used for:  Acute left-sided low back pain without sciatica        Dose:  1000 mg   Take 2 tablets (1,000 mg) by mouth 3 times daily   Quantity:  1 Bottle   Refills:  0         CONTINUE these medicines which have NOT CHANGED        Dose / Directions    ATORVASTATIN CALCIUM PO        Dose:  40 mg   Take 40 mg by mouth daily.   Refills:  0       CITALOPRAM HYDROBROMIDE PO        Dose:  20 mg   Take 20 mg by mouth daily.   Refills:  0       clopidogrel 75 MG tablet   Commonly known as:  PLAVIX        Dose:  75 mg   Take 75 mg by mouth daily   Refills:  0       fluticasone 50 MCG/ACT spray   Commonly known as:  FLONASE        Dose:  2 spray   Spray 2 sprays into both nostrils daily   Refills:  0       furosemide 20 MG tablet   Commonly known as:  LASIX        Dose:  20 mg   Take 20 mg by mouth daily   Refills:  0       LEVEMIR FLEXPEN/FLEXTOUCH 100 UNIT/ML injection   Generic drug:  insulin detemir        Dose:  46 Units   Inject 46 Units Subcutaneous every morning   Refills:  0       menthol-zinc oxide 0.44-20.625 % Oint ointment   Commonly known as:  CALMOSEPTINE        Apply topically 2 times daily as needed for skin protection   Refills:  0       order for DME   Used for:  Open wound of knee, leg, and ankle, unspecified laterality, subsequent encounter, Swelling of limb, Localized edema, Lymphedema of both lower extremities, Chronic ulcer of ankle, unspecified laterality, 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), Venous ulcer of ankle, unspecified laterality (H)        [employer] Phone #[phone] Fax #[phone] Ready To Wear Knee High Compression Stockings 20-30 mmHg  May need carlene and liner  Length of Need: Life Time # of Pairs 3   Quantity:  3 Device   Refills:  0        SINGULAIR PO        Dose:  10 mg   Take 10 mg by mouth At Bedtime   Refills:  0            Where to get your medicines      These medications were sent to [facility] [place], [place] - [address]  [address] [place] [address]     Phone:  [phone]     acetaminophen 500 MG tablet    Lidocaine 4 % Patch    senna-docusate 8.6-50 MG per tablet                Protect others around you: Learn how to safely use, store and throw away your medicines at www.disposemymeds.org.             Medication List: This is a list of all your medications and when to take them. Check marks below indicate your daily home schedule. Keep this list as a reference.      Medications           Morning Afternoon Evening Bedtime As Needed    acetaminophen 500 MG tablet   Commonly known as:  TYLENOL   Take 2 tablets (1,000 mg) by mouth 3 times daily   Last time this was given:  1,000 mg on 5/24/2018  8:00 AM                                         ATORVASTATIN CALCIUM PO   Take 40 mg by mouth daily.                                   CITALOPRAM HYDROBROMIDE PO   Take 20 mg by mouth daily.                                   clopidogrel 75 MG tablet   Commonly known as:  PLAVIX   Take 75 mg by mouth daily   Last time this was given:  75 mg on 5/24/2018  8:00 AM                                   fluticasone 50 MCG/ACT spray   Commonly known as:  FLONASE   Spray 2 sprays into both nostrils daily                                   furosemide 20 MG tablet   Commonly known as:  LASIX   Take 20 mg by mouth daily   Last time this was given:  20 mg on 5/24/2018  8:00 AM                                   LEVEMIR FLEXPEN/FLEXTOUCH 100 UNIT/ML injection   Inject 46 Units Subcutaneous every morning   Last time this was given:  46 Units on 5/24/2018  9:15 AM   Generic drug:  insulin detemir                                   Lidocaine 4 % Patch   Commonly known as:  LIDOCARE   Place 1 patch onto the skin every 24 hours   Last  time this was given:  1 patch on 5/24/2018  8:02 AM   Notes to Patient:  12 hours on. 12 hours off.                                   menthol-zinc oxide 0.44-20.625 % Oint ointment   Commonly known as:  CALMOSEPTINE   Apply topically 2 times daily as needed for skin protection                                   order for DME   [employer] Phone #[phone] Fax #[phone] Ready To Wear Knee High Compression Stockings 20-30 mmHg  May need carlene and liner  Length of Need: Life Time # of Pairs 3                                senna-docusate 8.6-50 MG per tablet   Commonly known as:  SENOKOT-S;PERICOLACE   Take 1 tablet by mouth 2 times daily                                      SINGULAIR PO   Take 10 mg by mouth At Bedtime   Last time this was given:  10 mg on 5/23/2018 10:06 PM

## 2018-05-23 NOTE — ED NOTES
Bed: ED20  Expected date:   Expected time:   Means of arrival:   Comments:  431  89 F flank pain  0824

## 2018-05-23 NOTE — H&P
[facility]    History and Physical  Hospitalist       Date of Admission:  5/23/2018    Assessment & Plan   Sheila Johnson is a 89 year old female with past history DM II, hyperlipidemia, TIA/CVA, conduction disease s/p PPM, CKD who presents with left flank pain.    Left flank pain, suspect musculoskeletal:  Has point tenderness to left lateral lower thoracic spine.  Suspect this was due to exercise program she performed with PT the day prior.  UA, CXR, abd CT unremarkable.  - tylenol tid, lidocaine patch, prn oxy  - PT eval    Hypoxia:  Suspect this was secondary to receiving dilaudid en route.  CXR clear with normal lung exam.  Cannot rule out component obesity hypoventilation.  Denies any dyspnea or respiratory symptoms.  - wean oxygen  - continue PTA lasix    Depression:  Hold citalopram as Obs.    DM II with long term insulin, uncontrolled:  A1C 9.8% in April 2018.  - continue PTA Lantus 46 units daily  - high dose SSI    Hyperlipidemia:  Hold statin as Obs    CKD stage III:  Cr at baseline of 0.9.    Hx of TIA/CVA:  Continue PTA Plavix.    DVT Prophylaxis: Low Risk/Ambulatory with no VTE prophylaxis indicated  Code Status: DNR / DNI    # Pain Assessment:  Current Pain Score 5/23/2018   Patient currently in pain? -   Pain score (0-10) 1   Pain location -   Pain descriptors -   - Sheila is experiencing pain due to muscle strain. Pain management was discussed and the plan was created in a collaborative fashion.  Sheila's response to the current recommendations: engaged  - Please see the plan for pain management as documented above      Disposition: Expected discharge in 1 days once pain adequately controlled on oral regimen, weaned from oxygen.    Jet Mendoza    Primary Care Physician   Thania Cadena    Chief Complaint   Back/flank pain    History is obtained from the patient and chart review    History of Present Illness   Sheila Johnson is a 89 year old female who presents with back  "and flank pain.  Patient reports yesterday she worked with a physical therapist where she walked the length of her building and performed a series of exercises.  She reports this is significantly more activity than what she is accustomed to.  She denies any known strain or any pain during her session, but woke up approximately 6 AM this morning with severe left flank pain.  She describes the pain as \"grabbing and stabbing\" and is worse with any movement.  She denies any palliative factors.  She reports she has had similar pain before but never anything this severe.  She reports she is moving her bowels well, denies any nausea, abdominal pain, fevers or chills or urinary symptoms.  She reports a fall one week ago but nothing within the past several days.  She endorses some left leg weakness which has been constant over the past two months but nothing acutely changed but denies any lower extremity paresthesias.  She denies any true spinal pain and denies any saddle anesthesia.  Remainder review of systems is negative.    Past Medical History    Diabetes mellitus type 2  Hyperlipidemia  Depression  TIA/CVA  Conduction disease status post pacemaker placement Chronic kidney disease stage III    Past Surgical History   Carpal tunnel release  Left TKA with revision  Right TKA    Prior to Admission Medications   Prior to Admission Medications   Prescriptions Last Dose Informant Patient Reported? Taking?   ATORVASTATIN CALCIUM PO 5/22/2018 at Unknown time Self Yes Yes   Sig: Take 40 mg by mouth daily.   Acetaminophen (TYLENOL PO) 5/22/2018 at Unknown time Self Yes Yes   Sig: Take 1,000 mg by mouth every morning    CITALOPRAM HYDROBROMIDE PO 5/22/2018 at Unknown time Self Yes Yes   Sig: Take 20 mg by mouth daily.   Montelukast Sodium (SINGULAIR PO) 5/22/2018 at Unknown time Self Yes Yes   Sig: Take 10 mg by mouth At Bedtime   clopidogrel (PLAVIX) 75 MG tablet 5/22/2018 at Unknown time Self Yes Yes   Sig: Take 75 mg by mouth " daily   fluticasone (FLONASE) 50 MCG/ACT spray 5/22/2018 at Unknown time Self Yes Yes   Sig: Spray 2 sprays into both nostrils daily    furosemide (LASIX) 20 MG tablet 5/22/2018 at Unknown time Self Yes Yes   Sig: Take 20 mg by mouth daily   insulin detemir (LEVEMIR FLEXPEN/FLEXTOUCH) 100 UNIT/ML injection 5/22/2018 at Unknown time Self Yes Yes   Sig: Inject 46 Units Subcutaneous every morning    menthol-zinc oxide (CALMOSEPTINE) 0.44-20.625 % OINT ointment  at prn Self Yes Yes   Sig: Apply topically 2 times daily as needed for skin protection   order for DME  Self No No   Sig: [employer] Stock[employer]  Phone #[phone]  Fax #[phone]  Ready To Wear Knee High Compression Stockings  20-30 mmHg   May need carlene and liner   Length of Need: Life Time  # of Pairs 3      Facility-Administered Medications: None     Allergies   No Known Allergies    Social History   I have personally reviewed the social history with the patient showing a 20-pack-year history of tobacco abuse.  She reports approximately one alcoholic beverage per month.  She lives independently and ambulates with a walker.  Has home RN/PT/OT visits currently.    Family History   Denies any significant family history.    Review of Systems   The 10 point Review of Systems is negative other than noted in the HPI or here.     Physical Exam   Temp: 97.4  F (36.3  C) Temp src: Oral BP: 124/57 Pulse: 61   Resp: 16 SpO2: 91 % O2 Device: None (Room air)    Vital Signs with Ranges  Temp:  [97.4  F (36.3  C)] 97.4  F (36.3  C)  Pulse:  [61] 61  Resp:  [16] 16  BP: (123-169)/(57-81) 124/57  SpO2:  [80 %-97 %] 91 %  220 lbs 0 oz    Constitutional: well developed, well nourished female in no acute distress  Eyes: pupils equal, round and reactive to light, no icterus  HEENT: head normocephalic, atraumatic, mucous membranes moist, no cervical lymphadenopathy  Respiratory: difficult to auscultate due to body habitus, shallow inspirations with few basilar  crackles, no wheezing  Cardiovascular: regular rate and rhythm, normal S1/S2, no murmur, rubs or gallops  GI: abdomen soft, non-tender, non-distended, normal bowel sounds, no hepatosplenomegaly  Lymph/Hematologic: 1-2+ peripheral edema (L>R, chronic)  Musculoskeletal: Extremities warm and well perfused, point tenderness of lateral left mid-thoracic spine  Neurologic: alert and appropriate, cranial nerves grossly intact, gross motor movements intact, strength grossly intact  Psychiatric: normal affect    Data   Data reviewed today:  I personally reviewed the chest x-ray image(s) showing clear lungs without edema or infiltrate.    Recent Labs  Lab 05/23/18  0835   WBC 11.7*   HGB 12.1   MCV 88         POTASSIUM 4.3   CHLORIDE 101   CO2 28   BUN 27   CR 0.98   ANIONGAP 8   JAVON 8.7   *       Recent Results (from the past 24 hour(s))   CT Abdomen Pelvis w Contrast    Narrative    CT ABDOMEN AND PELVIS WITH CONTRAST  5/23/2018 10:15 AM    HISTORY:  Left flank pain, suprapubic pain;     COMPARISON: A CT without contrast on 6/29/2009.    TECHNIQUE: Routine transverse CT imaging of the abdomen and pelvis was  performed following the uneventful administration of 111 mL Isovue-370  intravenous contrast. Radiation dose for this scan was reduced using  automated exposure control, adjustment of the mA and/or kV according  to patient size, or iterative reconstruction technique.    FINDINGS: The visualized lung bases are clear. The liver, spleen,  pancreas, gallbladder, and adrenal glands are normal. There is a 2.1  cm simple cyst of the right kidney. The kidneys are otherwise  unremarkable. No bladder pathology is demonstrated. No enlarged lymph  node or other abnormal mass is demonstrated. No free fluid is seen. No  free intraperitoneal gas is identified. There are several diverticula  of the distal colon with no evidence of diverticulitis. The  gastrointestinal tract is otherwise unremarkable. There has  been  resolution of previously seen diffuse colitis. The appendix is not  identified. There is no additional evidence of appendicitis. There is  calcification of the vascular structures. There are degenerative  changes of the spine and hips. No other osseous abnormality is  demonstrated. No abdominal or pelvic wall pathology is demonstrated.       Impression    IMPRESSION: Colonic diverticulosis with no evidence of diverticulitis.  No acute abnormality is seen.     ROSANNA VAZQUEZ MD   XR Chest 2 Views    Narrative    CHEST TWO VIEWS 5/23/2018 1:22 PM     HISTORY: Shortness of breath.    COMPARISON: 4/28/2018      Impression    IMPRESSION: Left subclavian cardiac device is in place. The cardiac  size is at the upper limits of normal and stable. No evidence of  pneumothorax. There are no acute infiltrates.    ROCKY MCKINNEY MD

## 2018-05-23 NOTE — IP AVS SNAPSHOT
[facility] Observation Unit    [address] [place] [id number]    Phone:  [phone]                                       After Visit Summary   5/23/2018    Sheila Johnson    MRN: [id number]           After Visit Summary Signature Page     I have received my discharge instructions, and my questions have been answered. I have discussed any challenges I see with this plan with the nurse or doctor.    ..........................................................................................................................................  Patient/Patient Representative Signature      ..........................................................................................................................................  Patient Representative Print Name and Relationship to Patient    ..................................................               ................................................  Date                                            Time    ..........................................................................................................................................  Reviewed by Signature/Title    ...................................................              ..............................................  Date                                                            Time

## 2018-05-23 NOTE — ED NOTES
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    Bariatric?: No    Vital Signs:   Vitals:    05/23/18 1215 05/23/18 1230 05/23/18 1245 05/23/18 1300   BP: 129/62 124/57     Pulse:       Resp:       Temp:       TempSrc:       SpO2: 92% 91% 90% 91%   Weight:       Height:           Cardiac Rhythm: ,        Pain level: 0-10 Pain Scale: 1    Is this patient confused?: No   Suicidality: Screened negative    Patient Report: Initial Complaint: left flank area pain that is worsening, today had too much pain to walk   Focused Assessment: left flank area pain, worse with movement, no injury, found to have low O2 sats, new for pt, 85-90 on room air, 96% on 2 liters   Tests Performed: labs, cxt CT abd   Abnormal Results: nothing significant   Treatments provided: lido patch and tylenol     Family Comments: daughters were here     OBS brochure/video discussed/provided to patient: yes  ED Medications:   Medications   lidocaine patch REMOVAL (not administered)   lidocaine patch in PLACE (not administered)   Lidocaine (LIDOCARE) 4 % Patch 1 patch (1 patch Transdermal Given 5/23/18 1159)   acetaminophen (TYLENOL) tablet 975 mg (975 mg Oral Given 5/23/18 0927)   Saline Flush (74 mLs Intravenous Given 5/23/18 1004)   iopamidol (ISOVUE-370) solution 111 mL (111 mLs Intravenous Given 5/23/18 1004)       Drips infusing?:  No    For the majority of the shift this patient was Green.   Interventions performed were na.    Severe Sepsis OR Septic Shock Diagnosis Present:  No      ED NURSE PHONE NUMBER: [phone]

## 2018-05-23 NOTE — PHARMACY-ADMISSION MEDICATION HISTORY
Admission medication history interview status for the 5/23/2018  admission is complete. See EPIC admission navigator for prior to admission medications     Medication history source reliability:Moderate    Actions taken by pharmacist (provider contacted, etc):  Spoke w/ patient and her family who had a medication list with them.      Additional medication history information not noted on PTA med list :None    Medication reconciliation/reorder completed by provider prior to medication history? No    Time spent in this activity: 15 minutes    Prior to Admission medications    Medication Sig Last Dose Taking? Auth Provider   Acetaminophen (TYLENOL PO) Take 1,000 mg by mouth every morning  5/22/2018 at Unknown time Yes Unknown, Entered By History   ATORVASTATIN CALCIUM PO Take 40 mg by mouth daily. 5/22/2018 at Unknown time Yes Unknown, Entered By History   CITALOPRAM HYDROBROMIDE PO Take 20 mg by mouth daily. 5/22/2018 at Unknown time Yes Unknown, Entered By History   clopidogrel (PLAVIX) 75 MG tablet Take 75 mg by mouth daily 5/22/2018 at Unknown time Yes Reported, Patient   fluticasone (FLONASE) 50 MCG/ACT spray Spray 2 sprays into both nostrils daily  5/22/2018 at Unknown time Yes Unknown, Entered By History   furosemide (LASIX) 20 MG tablet Take 20 mg by mouth daily 5/22/2018 at Unknown time Yes Reported, Patient   insulin detemir (LEVEMIR FLEXPEN/FLEXTOUCH) 100 UNIT/ML injection Inject 46 Units Subcutaneous every morning  5/22/2018 at Unknown time Yes Unknown, Entered By History   menthol-zinc oxide (CALMOSEPTINE) 0.44-20.625 % OINT ointment Apply topically 2 times daily as needed for skin protection  at prn Yes Unknown, Entered By History   Montelukast Sodium (SINGULAIR PO) Take 10 mg by mouth At Bedtime 5/22/2018 at Unknown time Yes Unknown, Entered By History   order for [employer] Compression Stockings  Phone #[phone]  Fax #[phone]  Ready To Wear Knee High Compression Stockings  20-30 mmHg   May  need carlene and liner   Length of Need: Life Time  # of Pairs 3   Julieta Horta, PA-C     Betty Astudillo, PharmD, BCPS

## 2018-05-24 ENCOUNTER — APPOINTMENT (OUTPATIENT)
Dept: PHYSICAL THERAPY | Facility: CLINIC | Age: 83
End: 2018-05-24
Attending: HOSPITALIST
Payer: MEDICARE

## 2018-05-24 VITALS
WEIGHT: 220 LBS | OXYGEN SATURATION: 94 % | HEIGHT: 57 IN | TEMPERATURE: 96.4 F | RESPIRATION RATE: 16 BRPM | BODY MASS INDEX: 47.46 KG/M2 | DIASTOLIC BLOOD PRESSURE: 66 MMHG | SYSTOLIC BLOOD PRESSURE: 169 MMHG | HEART RATE: 68 BPM

## 2018-05-24 LAB
ERYTHROCYTE [DISTWIDTH] IN BLOOD BY AUTOMATED COUNT: 13.5 % (ref 10–15)
GLUCOSE BLDC GLUCOMTR-MCNC: 202 MG/DL (ref 70–99)
GLUCOSE BLDC GLUCOMTR-MCNC: 216 MG/DL (ref 70–99)
GLUCOSE BLDC GLUCOMTR-MCNC: 219 MG/DL (ref 70–99)
GLUCOSE BLDC GLUCOMTR-MCNC: 291 MG/DL (ref 70–99)
HCT VFR BLD AUTO: 35.6 % (ref 35–47)
HGB BLD-MCNC: 11.5 G/DL (ref 11.7–15.7)
MCH RBC QN AUTO: 28.8 PG (ref 26.5–33)
MCHC RBC AUTO-ENTMCNC: 32.3 G/DL (ref 31.5–36.5)
MCV RBC AUTO: 89 FL (ref 78–100)
PLATELET # BLD AUTO: 278 10E9/L (ref 150–450)
RBC # BLD AUTO: 3.99 10E12/L (ref 3.8–5.2)
WBC # BLD AUTO: 11.1 10E9/L (ref 4–11)

## 2018-05-24 PROCEDURE — 00000146 ZZHCL STATISTIC GLUCOSE BY METER IP

## 2018-05-24 PROCEDURE — 40000193 ZZH STATISTIC PT WARD VISIT

## 2018-05-24 PROCEDURE — 36415 COLL VENOUS BLD VENIPUNCTURE: CPT | Performed by: HOSPITALIST

## 2018-05-24 PROCEDURE — 97116 GAIT TRAINING THERAPY: CPT | Mod: GP

## 2018-05-24 PROCEDURE — 97161 PT EVAL LOW COMPLEX 20 MIN: CPT | Mod: GP

## 2018-05-24 PROCEDURE — 25000132 ZZH RX MED GY IP 250 OP 250 PS 637: Mod: GY | Performed by: HOSPITALIST

## 2018-05-24 PROCEDURE — 96372 THER/PROPH/DIAG INJ SC/IM: CPT

## 2018-05-24 PROCEDURE — A9270 NON-COVERED ITEM OR SERVICE: HCPCS | Mod: GY | Performed by: HOSPITALIST

## 2018-05-24 PROCEDURE — 25000131 ZZH RX MED GY IP 250 OP 636 PS 637: Mod: GY | Performed by: HOSPITALIST

## 2018-05-24 PROCEDURE — 85027 COMPLETE CBC AUTOMATED: CPT | Performed by: HOSPITALIST

## 2018-05-24 PROCEDURE — G0378 HOSPITAL OBSERVATION PER HR: HCPCS

## 2018-05-24 PROCEDURE — 99217 ZZC OBSERVATION CARE DISCHARGE: CPT | Performed by: PHYSICIAN ASSISTANT

## 2018-05-24 RX ORDER — AMOXICILLIN 250 MG
1 CAPSULE ORAL 2 TIMES DAILY
Qty: 100 TABLET | Refills: 0 | Status: SHIPPED | OUTPATIENT
Start: 2018-05-24

## 2018-05-24 RX ORDER — AMOXICILLIN 250 MG
1 CAPSULE ORAL 2 TIMES DAILY
Status: DISCONTINUED | OUTPATIENT
Start: 2018-05-24 | End: 2018-05-24 | Stop reason: HOSPADM

## 2018-05-24 RX ORDER — ACETAMINOPHEN 500 MG
1000 TABLET ORAL 3 TIMES DAILY
Qty: 1 BOTTLE | Refills: 0 | Status: SHIPPED | OUTPATIENT
Start: 2018-05-24

## 2018-05-24 RX ORDER — LIDOCAINE 4 G/G
1 PATCH TOPICAL EVERY 24 HOURS
Qty: 30 PATCH | Refills: 0 | Status: SHIPPED | OUTPATIENT
Start: 2018-05-24 | End: 2020-01-01

## 2018-05-24 RX ORDER — AMOXICILLIN 250 MG
2 CAPSULE ORAL 2 TIMES DAILY
Status: DISCONTINUED | OUTPATIENT
Start: 2018-05-24 | End: 2018-05-24 | Stop reason: HOSPADM

## 2018-05-24 RX ADMIN — INSULIN ASPART 3 UNITS: 100 INJECTION, SOLUTION INTRAVENOUS; SUBCUTANEOUS at 09:15

## 2018-05-24 RX ADMIN — CLOPIDOGREL 75 MG: 75 TABLET, FILM COATED ORAL at 08:00

## 2018-05-24 RX ADMIN — ACETAMINOPHEN 1000 MG: 500 TABLET, FILM COATED ORAL at 08:00

## 2018-05-24 RX ADMIN — INSULIN DETEMIR 46 UNITS: 100 INJECTION, SOLUTION SUBCUTANEOUS at 09:15

## 2018-05-24 RX ADMIN — INSULIN ASPART 7 UNITS: 100 INJECTION, SOLUTION INTRAVENOUS; SUBCUTANEOUS at 14:24

## 2018-05-24 RX ADMIN — LIDOCAINE 1 PATCH: 560 PATCH PERCUTANEOUS; TOPICAL; TRANSDERMAL at 08:02

## 2018-05-24 RX ADMIN — FUROSEMIDE 20 MG: 20 TABLET ORAL at 08:00

## 2018-05-24 NOTE — PLAN OF CARE
Problem: Patient Care Overview  Goal: Plan of Care/Patient Progress Review  Outcome: Improving  A/o4. Up A1. VSS 2L, ex hypertensive 150s/70s. L flank pain/spasms improving, denies when still; mgt w/sched tyl & lidocaine patch, & PRN flexeril, oxycodone. Tolerating MCHO diet. Voiding on BSC. Cont to monitor.   -diagnostic tests and consults completed and resulted : not met  -vital signs normal or at patient baseline : not met  -adequate pain control on oral analgesics : met  -dyspnea improved and O2 sats greater than 88% on room air or prior home oxygen levels : not met, still on 2L O2.  -safe disposition plan has been identified : not met

## 2018-05-24 NOTE — PROGRESS NOTES
05/24/18 0957   Quick Adds   Type of Visit Initial PT Evaluation   Living Environment   Lives With alone   Living Arrangements apartment  (senior apartment)   Home Accessibility no concerns   Number of Stairs to Enter Home 0   Number of Stairs Within Home 0   Self-Care   Usual Activity Tolerance moderate   Current Activity Tolerance fair   Regular Exercise no   Equipment Currently Used at Home walker, rolling   Functional Level Prior   Ambulation 1-->assistive equipment   Transferring 1-->assistive equipment   Fall history within last six months yes   Number of times patient has fallen within last six months 4   Which of the above functional risks had a recent onset or change? ambulation;transferring;fall history   General Information   Onset of Illness/Injury or Date of Surgery - Date 05/23/18   Referring Physician Jet Mendoza MD   Patient/Family Goals Statement return home   Pertinent History of Current Problem (include personal factors and/or comorbidities that impact the POC) Admitted under observation status with hypoxia, L flank pain. PMH: depression, Dm2, TIA/CVA, PPM, CKD.   Precautions/Limitations fall precautions   Weight-Bearing Status - LLE full weight-bearing   Weight-Bearing Status - RLE full weight-bearing   General Info Comments Has home PT   Cognitive Status Examination   Orientation orientation to person, place and time   Level of Consciousness alert   Follows Commands and Answers Questions 100% of the time;able to follow multistep instructions   Personal Safety and Judgment intact   Pain Assessment   Patient Currently in Pain Yes, see Vital Sign flowsheet  (pain with transfers)   Posture    Posture Forward head position;Protracted shoulders   Range of Motion (ROM)   ROM Quick Adds No deficits were identified   Strength   Strength Comments B hip flex 3+/5, knee ext 4/5, DF 4/5   Bed Mobility   Bed Mobility Comments NT, pt in chair   Transfer Skills   Transfer Comments Sit to stand with FWW and  "SBA   Gait   Gait Comments Pt amb 50 ft with FWW and SBA, steady balance   Balance   Balance Comments Balance steady with gait   Sensory Examination   Sensory Perception Comments Denies numbness or tingling   General Therapy Interventions   Planned Therapy Interventions gait training;transfer training   Clinical Impression   Criteria for Skilled Therapeutic Intervention yes, treatment indicated   PT Diagnosis Difficulty ambulating   Influenced by the following impairments Pain, dec strength, balance, activity tolearnce   Functional limitations due to impairments Difficulty ambulating and transferring   Clinical Presentation Stable/Uncomplicated   Clinical Presentation Rationale medically stable   Clinical Decision Making (Complexity) Low complexity   Therapy Frequency` daily   Predicted Duration of Therapy Intervention (days/wks) 1 day   Anticipated Discharge Disposition Home with Home Therapy   Risk & Benefits of therapy have been explained Yes   Patient, Family & other staff in agreement with plan of care Yes   [facility] TM \"6 Clicks\"   2016, Trustees of [facility], under license to [employer].  All rights reserved.   6 Clicks Short Forms Basic Mobility Inpatient Short Form   [facility]  \"6 Clicks\" V.2 Basic Mobility Inpatient Short Form   1. Turning from your back to your side while in a flat bed without using bedrails? 4 - None   2. Moving from lying on your back to sitting on the side of a flat bed without using bedrails? 4 - None   3. Moving to and from a bed to a chair (including a wheelchair)? 3 - A Little   4. Standing up from a chair using your arms (e.g., wheelchair, or bedside chair)? 3 - A Little   5. To walk in hospital room? 3 - A Little   6. Climbing 3-5 steps with a railing? 2 - A Lot   Basic Mobility Raw Score (Score out of 24.Lower scores equate to lower levels of function) 19   Total Evaluation Time   Total Evaluation Time (Minutes) 10     "

## 2018-05-24 NOTE — PLAN OF CARE
Problem: Patient Care Overview  Goal: Plan of Care/Patient Progress Review  Outcome: Improving  PRIMARY DIAGNOSIS: ACUTE PAIN  OUTPATIENT/OBSERVATION GOALS TO BE MET BEFORE DISCHARGE:  1. Pain Status: Improved-controlled with oral pain medications.    2. Return to near baseline physical activity: No    3. Cleared for discharge by consultants (if involved): No, PT consulted    Discharge Planner Nurse   Safe discharge environment identified: No  Barriers to discharge: Yes       Entered by: Erich López 05/24/2018 9:35 AM     Please review provider order for any additional goals.   Nurse to notify provider when observation goals have been met and patient is ready for discharge.

## 2018-05-24 NOTE — PLAN OF CARE
Problem: Patient Care Overview  Goal: Plan of Care/Patient Progress Review  PT:  Discharge Planner PT   Patient plan for discharge: Return home with home care and family assist as prior  Current status: Orders received, eval completed, treatment initiated. Pt admitted under observation status with L flank pain and hypoxia. Prior to admit pt was living alone in a senior independent apartment, uses a FWW, independent with mobility. Family is very involved with chores, driving, groceries, getting dinner from dining room for pt when she feels she cannot go down there. Pt currently has home PT coming to her apartment. Currently requires SBA for sit to/from stand, SBA for gait of 50 ft with FWW with slow pace and minimal pain. States pain mostly with transfers but is improved. Pt demonstrates pain, dec strength, activity tolerance and difficulty ambulating and transferring and would benefit from skilled PT services in order to improve this.  Barriers to return to prior living situation: Pain limiting mobility  Recommendations for discharge: Return home with home PT as prior, family assist as prior  Rationale for recommendations: Pt would benefit from continued PT to improve strength, balance, mobility to increase independence, reduce falls risk and increase safety.       Entered by: Lianna Garcia 05/24/2018 10:27 AM

## 2018-05-24 NOTE — PLAN OF CARE
Problem: Patient Care Overview  Goal: Plan of Care/Patient Progress Review  Outcome: Improving  PRIMARY DIAGNOSIS: ACUTE PAIN  OUTPATIENT/OBSERVATION GOALS TO BE MET BEFORE DISCHARGE:  1. Pain Status: Improved-controlled with oral pain medications.    2. Return to near baseline physical activity: No    3. Cleared for discharge by consultants (if involved): No, PT consulted    Discharge Planner Nurse   Safe discharge environment identified: No  Barriers to discharge: Yes       Entered by: Erich López 05/24/2018 2:43 PM     Please review provider order for any additional goals.   Nurse to notify provider when observation goals have been met and patient is ready for discharge.    Pt A&O x4. VSS on RA. Ambulates with SBA and FWW. Rates pain to L flank 2/10. Denies intervention. Lidoderm patch in place. Tolerating mod carb diet. Elevated BG, f/u with primary. PIV removed. Pt dressed. AVS and d/c medications reviewed. Waiting on ride.

## 2018-05-24 NOTE — DISCHARGE SUMMARY
[facility]    Discharge Summary  Hospitalist    Date of Admission:  5/23/2018  Date of Discharge:  5/24/2018  Discharging Provider: Argenis Mendiola PA-C  Date of Service (when I saw the patient): 05/24/18    Discharge Diagnoses   Intractable back pain, thoracic region    History of Present Illness   Sheila Johnson is a 89 year old female with past history DM II, hyperlipidemia, TIA/CVA, conduction disease s/p PPM, CKD who presents with left flank pain.    Hospital Course   Sheila Johnson was admitted on 5/23/2018.  The following problems were addressed during her hospitalization:    Intractable back pain, thoracic region:  Has point tenderness to left lateral lower thoracic spine.  Suspect this was due to exercise program she performed with PT the day prior.  UA, CXR, abd CT A/P unremarkable.  - tylenol 1000 mg po TID and lidoderm patch prescribed at discharge, no narcotics   - Ice/heat  - PT evaluated and recommend home PT which pt has been receiving, resumed at discharge   - Pt to follow-up with her PCP in the next week to discuss hospitalization     Hypoxia, resolved  Chronic sinusitis:  Suspect this was secondary to receiving dilaudid en route.  CXR clear with normal lung exam.  Cannot rule out component obesity hypoventilation or FABIAN as it appears that she dropped to 86% while sleeping on the day of discharge. When awake and conversing, SpO2's in the mid 90s on room air.  Denies any dyspnea or respiratory symptoms. WBC 11.7>11.1. Afebrile. Echo from 2018 showing grade I or early diastolic dysfunction, EF 60-65%  - Continue PTA Flonase and Singulair   - Consider outpatient sleep study     Constipation: Senokot prescribed at discharge, take as instructed      Depression:  Hold citalopram as Obs.     DM II with long term insulin, uncontrolled:  A1C of 10.1 on admission.   - continue PTA Lantus 46 units daily  - Ongoing management per PCP     Recent Labs  Lab 05/24/18  1312  05/24/18  0812 05/24/18  0547 05/24/18  0143 05/23/18  2214 05/23/18  1658 05/23/18  0835   GLC  --   --   --   --   --   --  198*   * 202* 216* 219* 259* 184*  --       Hyperlipidemia:  Hold statin as Obs     CKD stage III:  Cr at baseline of 0.9.     Hx of TIA/CVA  Hx of verterbal artery dissection (2013):  Recent hospitalization at [facility] from 4/25-4/28. MRI brain in 4/26/2018 showing an acute appearing 5 mm area of ischemia within the right occipital white matter and evidence of multiple previous microhemorrhages that appear centered in the basal ganglia and are likely due to HTN; amyloid angiopathy could be considered. No residual deficits aside from some aphasia.   --  Continue PTA Plavix.    Advanced AV conduction system disease. Second-degree AVB with RBBB/LAFB on conducted beats, prior falls s/p pacemaker placement on 4/27/18: Dual Biotronick PM placed.   -- Defer further management to outpatient Cardiology     Hx of C difficile (2016): No recurrence.     Lower extremity edema: Echo from 2018 showing grade I or early diastolic dysfunction, EF 60-65%.  -- Continue PTA Lasix.     # Discharge Pain Plan:   - During her hospitalization, Sheila experienced pain due to above.  The pain plan for discharge was discussed with Sheila and the plan was created in a collaborative fashion.    - See above    Argenis Mendiola PA-C    This patient was discussed with Dr. Jacques of the Hospitalist Service who agrees with current plans as outlined above.     Significant Results and Procedures   See below    Pending Results   These results will be followed up by PCP  Unresulted Labs Ordered in the Past 30 Days of this Admission     No orders found for last 61 day(s).        Code Status  DNR / DNI       Primary Care Physician   Thania Cadena    Physical Exam   Temp: 96.4  F (35.8  C) Temp src: Oral BP: 169/66 Pulse: 68   Resp: 16 SpO2: 94 % O2 Device: Nasal cannula Oxygen Delivery: 2 LPM  Vitals:    05/23/18 0839    Weight: 99.8 kg (220 lb)     Vital Signs with Ranges  Temp:  [96.4  F (35.8  C)-97.9  F (36.6  C)] 96.4  F (35.8  C)  Pulse:  [58-72] 68  Resp:  [16-18] 16  BP: (160-171)/(61-69) 169/66  SpO2:  [85 %-96 %] 94 %  I/O last 3 completed shifts:  In: -   Out: 1250 [Urine:1250]    CONSTITUTIONAL: Obese female, sitting upright in chair dressed in hospital garb. Appears comfortable. Cooperative with interview.   HEENT: Normocephalic, atraumatic. Negative for conjunctival redness or scleral icterus.    CARDIOVASCULAR: RRR, no murmurs, rubs, or extra heart sounds appreciated. Pulses +2/4 and regular in upper and lower extremities, bilaterally.   RESPIRATORY: No increased work of breathing.  CTA, bilat; no wheezes, rales, or rhonchi appreciated.  GASTROINTESTINAL:  Abdomen soft, non-distended. BS auscultated in all four quadrants. Negative for tenderness to palpation.  No masses or organomegaly noted.  MUSCULOSKELETAL: Tenderness upon palpation along left thoracic paraspinous region. No gross deformities noted. Normal muscle tone.   HEMATOLOGIC/LYMPHATIC/IMMUNOLOGIC:  Negative for lower extremity edema, bilaterally.  NEUROLOGIC: Alert and oriented to person, place, and time. Some baseline aphasia from prior CVA  strength intact.   SKIN: Warm, dry, intact. No jaundice noted. Negative for suspicious lesions, rashes, bruising, open sores or abrasions.     Discharge Disposition   Discharged to home  Condition at discharge: Stable    Consultations This Hospital Stay   PHYSICAL THERAPY ADULT IP CONSULT    Time Spent on this Encounter   IArgenis, personally saw the patient today and spent greater than 30 minutes discharging this patient.    Discharge Orders     Home Care PT Referral for Hospital Discharge     Reason for your hospital stay   You were hospitalized for further evaluation and treatment of acute left-sided back pain, likely musculoskeletal strain.     Follow-up and recommended labs and tests     Follow up with primary care provider, Thania Cadena, within 7 days for hospital follow- up.  No follow up labs or test are needed.     Activity   Your activity upon discharge: activity as tolerated, use walker at all times.     Discharge Instructions   1) Follow-up with your primary care provider in the next week to discuss hospitalization   2) Pain control with tylenol 1000 mg by mouth three times daily and lidoderm patches, prescriptions at discharge   3) Heat and ice for pain control   4) Continue with home physical therapy   5) Stool softener prescribed at discharge  6) Consider outpatient sleep study due to drop in oxygen levels at times when you sleep     DNR/DNI     Diet   Follow this diet upon discharge: Resume home diet.       Discharge Medications   Discharge Medication List as of 5/24/2018  2:30 PM      START taking these medications    Details   Lidocaine (LIDOCARE) 4 % Patch Place 1 patch onto the skin every 24 hoursDisp-30 patch, [id number]      senna-docusate (SENOKOT-S;PERICOLACE) 8.6-50 MG per tablet Take 1 tablet by mouth 2 times daily, Disp-100 tablet, R-0, E-Prescribe         CONTINUE these medications which have CHANGED    Details   acetaminophen (TYLENOL) 500 MG tablet Take 2 tablets (1,000 mg) by mouth 3 times daily, Disp-1 Bottle, R-0, E-Prescribe         CONTINUE these medications which have NOT CHANGED    Details   ATORVASTATIN CALCIUM PO Take 40 mg by mouth daily., 40 mg, Oral, DAILY, Until Discontinued, Historical      CITALOPRAM HYDROBROMIDE PO Take 20 mg by mouth daily., 20 mg, Oral, DAILY, Until Discontinued, Historical      clopidogrel (PLAVIX) 75 MG tablet Take 75 mg by mouth daily, Historical      fluticasone (FLONASE) 50 MCG/ACT spray Spray 2 sprays into both nostrils daily , Historical      furosemide (LASIX) 20 MG tablet Take 20 mg by mouth daily, Historical      insulin detemir (LEVEMIR FLEXPEN/FLEXTOUCH) 100 UNIT/ML injection Inject 46 Units Subcutaneous every morning ,  Historical      menthol-zinc oxide (CALMOSEPTINE) 0.44-20.625 % OINT ointment Apply topically 2 times daily as needed for skin protectionHistorical      Montelukast Sodium (SINGULAIR PO) Take 10 mg by mouth At Bedtime, Historical      order for DME [employer]  Phone #[phone]  Fax #[phone]  Ready To Wear Knee High Compression Stockings  20-30 mmHg   May need carlene and liner   Length of Need: Life Time  # of Pairs 3Disp-3 Device, R-0, Local Print           Allergies   No Known Allergies  Data   Most Recent 3 CBC's:  Recent Labs   Lab Test  05/24/18   0559  05/23/18   0835  04/28/18   0540   WBC  11.1*  11.7*  11.2*   HGB  11.5*  12.1  12.3   MCV  89  88  89   PLT  278  307  265      Most Recent 3 BMP's:  Recent Labs   Lab Test  05/23/18   0835  04/28/18   0540  04/27/18   0832   NA  137  138  140   POTASSIUM  4.3  3.8  5.0   CHLORIDE  101  104  102   CO2  28  28  32   BUN  27  18  18   CR  0.98  0.89  1.11*   ANIONGAP  8  6  6   JAVON  8.7  8.6  8.9   GLC  198*  149*  213*     Most Recent 2 LFT's:  Recent Labs   Lab Test  04/26/18   0610  04/25/18   1809   AST  13  11   ALT  12  14   ALKPHOS  59  75   BILITOTAL  0.5  0.6     Most Recent INR's and Anticoagulation Dosing History:  Anticoagulation Dose History     Recent Dosing and Labs Latest Ref Rng & Units 6/29/2009 5/6/2011    INR 0.86 - 1.14 1.43(H) 1.07        Most Recent 3 Troponin's:  Recent Labs   Lab Test  04/26/18   1630  04/26/18   0610  04/25/18   2255   TROPI  0.041  0.040  0.031     Most Recent Cholesterol Panel:  Recent Labs   Lab Test  04/26/18   0610   CHOL  144   LDL  71   HDL  50   TRIG  114     Most Recent 6 Bacteria Isolates From Any Culture (See EPIC Reports for Culture Details):  Recent Labs   Lab Test  04/25/18   2300  04/25/18   2235  05/06/11   1655   CULT  No growth  No growth  10 to 50,000 colonies/mL Streptococcus sanguis     Most Recent TSH, T4 and A1c Labs:  Recent Labs   Lab Test  05/23/18   0835  04/26/18    0610   TSH   --   1.62   A1C  10.1*   --      Results for orders placed or performed during the hospital encounter of 05/23/18   CT Abdomen Pelvis w Contrast    Narrative    CT ABDOMEN AND PELVIS WITH CONTRAST  5/23/2018 10:15 AM    HISTORY:  Left flank pain, suprapubic pain;     COMPARISON: A CT without contrast on 6/29/2009.    TECHNIQUE: Routine transverse CT imaging of the abdomen and pelvis was  performed following the uneventful administration of 111 mL Isovue-370  intravenous contrast. Radiation dose for this scan was reduced using  automated exposure control, adjustment of the mA and/or kV according  to patient size, or iterative reconstruction technique.    FINDINGS: The visualized lung bases are clear. The liver, spleen,  pancreas, gallbladder, and adrenal glands are normal. There is a 2.1  cm simple cyst of the right kidney. The kidneys are otherwise  unremarkable. No bladder pathology is demonstrated. No enlarged lymph  node or other abnormal mass is demonstrated. No free fluid is seen. No  free intraperitoneal gas is identified. There are several diverticula  of the distal colon with no evidence of diverticulitis. The  gastrointestinal tract is otherwise unremarkable. There has been  resolution of previously seen diffuse colitis. The appendix is not  identified. There is no additional evidence of appendicitis. There is  calcification of the vascular structures. There are degenerative  changes of the spine and hips. No other osseous abnormality is  demonstrated. No abdominal or pelvic wall pathology is demonstrated.       Impression    IMPRESSION: Colonic diverticulosis with no evidence of diverticulitis.  No acute abnormality is seen.     ROSANNA VAZQUEZ MD   XR Chest 2 Views    Narrative    CHEST TWO VIEWS 5/23/2018 1:22 PM     HISTORY: Shortness of breath.    COMPARISON: 4/28/2018      Impression    IMPRESSION: Left subclavian cardiac device is in place. The cardiac  size is at the upper limits of  normal and stable. No evidence of  pneumothorax. There are no acute infiltrates.    ROCKY MCKINNEY MD

## 2018-05-24 NOTE — PLAN OF CARE
Problem: Patient Care Overview  Goal: Plan of Care/Patient Progress Review  Outcome: Improving  -diagnostic tests and consults completed and resulted : not met  -vital signs normal or at patient baseline : not met  -adequate pain control on oral analgesics : met  -dyspnea improved and O2 sats greater than 88% on room air or prior home oxygen levels : not met  -safe disposition plan has been identified : not met

## 2018-05-24 NOTE — PROGRESS NOTES
[facility]  Patient is currently open to home care services with [facility]. The patient is currently receiving RN/PT/OT services. [facility]  and team have been notified that patient is under OBSERVATION STATUS.[facility] liaison will continue to follow patient during stay. If patient is admitted to inpatient status please provide orders to resume home care at time of discharge if appropriate.

## 2018-05-24 NOTE — PLAN OF CARE
Problem: Patient Care Overview  Goal: Plan of Care/Patient Progress Review  Outcome: No Change  Pt arrived from ED around 1630. VS stable on O2 @ 2L. Blood glucose 184, insulin coverage given. Complaining of left flank/back spasm, PRN oxycodone given. PRN flexeril available. Discharge plan pending.    -diagnostic tests and consults completed and resulted - not met  -vital signs normal or at patient baseline - not met, on O2  -adequate pain control on oral analgesics - not met, spasms despite oxycodone.  -dyspnea improved and O2 sats greater than 88% on room air or prior home oxygen levels - not met, on 2L of O2  -safe disposition plan has been identified - not met  Nurse to notify provider when observation goals have been met and patient is ready for discharge.

## 2018-06-14 ENCOUNTER — TELEPHONE (OUTPATIENT)
Dept: CARDIOLOGY | Facility: CLINIC | Age: 83
End: 2018-06-14

## 2018-06-14 NOTE — TELEPHONE ENCOUNTER
Pt had a 6 week post ppm implant today and was a no show. I called pt's daughter to inquire where she is living; any update. Asked that she call the clinic to let us know if Sheila will be able to come into the clinic. If not we can send a rep to the care facility. [email or web address]

## 2018-07-02 ENCOUNTER — ALLIED HEALTH/NURSE VISIT (OUTPATIENT)
Dept: CARDIOLOGY | Facility: CLINIC | Age: 83
End: 2018-07-02
Payer: COMMERCIAL

## 2018-07-02 DIAGNOSIS — Z95.0 CARDIAC PACEMAKER IN SITU: Primary | ICD-10-CM

## 2018-07-02 DIAGNOSIS — I44.2 ATRIOVENTRICULAR BLOCK, COMPLETE (H): ICD-10-CM

## 2018-07-02 PROCEDURE — 93280 PM DEVICE PROGR EVAL DUAL: CPT | Performed by: INTERNAL MEDICINE

## 2018-07-02 NOTE — MR AVS SNAPSHOT
After Visit Summary   7/2/2018    Sheila Johnson    MRN: [id number]           Patient Information     Date Of Birth          2/13/1924        Visit Information        Provider Department      7/2/2018 2:30 PM SASHA WRAY [facility]        Today's Diagnoses     Cardiac pacemaker in situ    -  1    Atrioventricular block, complete (H)           Follow-ups after your visit        Your next 10 appointments already scheduled     Aug 06, 2018  2:30 PM CDT   Return Visit with BELLO Huber CNP   [facility] ([facility])    [address] [id number] [phone] OPT 2            Oct 05, 2018  1:00 PM CDT   Remote PPM Check with SASHA TECH1   [facility] ([facility])    [address] [id number] [phone] OPT 2           This appointment is for a remote check of your pacemaker.  This is not an appointment at the office.              Who to contact     If you have questions or need follow up information about today's clinic visit or your schedule please contact [facility] directly at [phone].  Normal or non-critical lab and imaging results will be communicated to you by [employer], letter or phone within 4 business days after the clinic has received the results. If you do not hear from us within 7 days, please contact the clinic through [employer] or phone. If you have a critical or abnormal lab result, we will notify you by phone as soon as possible.  Submit refill requests through [employer] or call your pharmacy and they will forward the refill request to us. Please allow 3 business days for your refill to be completed.          Additional Information About Your Visit        [employer] Information     [employer] lets you send messages to your doctor, view your test results,  "renew your prescriptions, schedule appointments and more. To sign up, go to [email or web address] . Click on \"Log in\" on the left side of the screen, which will take you to the Welcome page. Then click on \"Sign up Now\" on the right side of the page.     You will be asked to enter the access code listed below, as well as some personal information. Please follow the directions to create your username and password.     Your access code is: [email or web address]  Expires: 2018 10:21 AM     Your access code will  in 90 days. If you need help or a new code, please call your [place] clinic or [phone].        Care EveryWhere ID     This is your Care EveryWhere ID. This could be used by other organizations to access your [place] medical records  [id number]         Blood Pressure from Last 3 Encounters:   18 169/66   18 159/73   10/19/17 164/71    Weight from Last 3 Encounters:   18 99.8 kg (220 lb)   18 99.8 kg (220 lb)   13 107.1 kg (236 lb 1.8 oz)              We Performed the Following     PM DEVICE PROGRAMMING EVAL, DUAL LEAD PACER ([id number])        Primary Care Provider Office Phone # Fax #    Thania Cadena [phone]       [facility] [address]  [facility] [address]        Equal Access to Services     MERA BENNETT : Hadii aad ku hadasho Soomaali, waaxda luqadaha, qaybta kaalmada nadine, rhina mclean . So [facility] [phone].    ATENCIÓN: Si habla jacquelin, tiene a junior disposición servicios gratuitos de asistencia lingüística. Llame al [phone].    We comply with applicable federal civil rights laws and [place] laws. We do not discriminate on the basis of race, color, national origin, age, disability, sex, sexual orientation, or gender identity.            Thank you!     Thank you for choosing [facility]  for your care. Our goal is always to provide you with excellent care. " Hearing back from our patients is one way we can continue to improve our services. Please take a few minutes to complete the written survey that you may receive in the mail after your visit with us. Thank you!             Your Updated Medication List - Protect others around you: Learn how to safely use, store and throw away your medicines at www.disposemymeds.org.          This list is accurate as of 7/2/18  3:21 PM.  Always use your most recent med list.                   Brand Name Dispense Instructions for use Diagnosis    acetaminophen 500 MG tablet    TYLENOL    1 Bottle    Take 2 tablets (1,000 mg) by mouth 3 times daily    Acute left-sided low back pain without sciatica       ATORVASTATIN CALCIUM PO      Take 40 mg by mouth daily.        CITALOPRAM HYDROBROMIDE PO      Take 20 mg by mouth daily.        clopidogrel 75 MG tablet    PLAVIX     Take 75 mg by mouth daily        fluticasone 50 MCG/ACT spray    FLONASE     Spray 2 sprays into both nostrils daily        furosemide 20 MG tablet    LASIX     Take 20 mg by mouth daily        LEVEMIR FLEXPEN/FLEXTOUCH 100 UNIT/ML injection   Generic drug:  insulin detemir      Inject 46 Units Subcutaneous every morning        Lidocaine 4 % Patch    LIDOCARE    30 patch    Place 1 patch onto the skin every 24 hours    Acute left-sided low back pain without sciatica       menthol-zinc oxide 0.44-20.625 % Oint ointment    CALMOSEPTINE     Apply topically 2 times daily as needed for skin protection        order for DME     3 Device    [employer] Phone #[phone] Fax #[phone] Ready To Wear Knee High Compression Stockings 20-30 mmHg  May need carlene and liner  Length of Need: Life Time # of Pairs 3    Open wound of knee, leg, and ankle, unspecified laterality, subsequent encounter, Swelling of limb, Localized edema, Lymphedema of both lower extremities, Chronic ulcer of ankle, unspecified laterality, 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), Venous ulcer of  ankle, unspecified laterality (H)       senna-docusate 8.6-50 MG per tablet    SENOKOT-S;PERICOLACE    100 tablet    Take 1 tablet by mouth 2 times daily    Constipation, unspecified constipation type       SINGULAIR PO      Take 10 mg by mouth At Bedtime

## 2018-08-23 ENCOUNTER — HOSPITAL ENCOUNTER (EMERGENCY)
Facility: CLINIC | Age: 83
Discharge: HOME | End: 2018-08-23
Attending: EMERGENCY MEDICINE | Admitting: EMERGENCY MEDICINE
Payer: MEDICARE

## 2018-08-23 VITALS
DIASTOLIC BLOOD PRESSURE: 76 MMHG | SYSTOLIC BLOOD PRESSURE: 144 MMHG | TEMPERATURE: 97.5 F | OXYGEN SATURATION: 97 % | RESPIRATION RATE: 18 BRPM

## 2018-08-23 DIAGNOSIS — R19.7 DIARRHEA, UNSPECIFIED TYPE: ICD-10-CM

## 2018-08-23 DIAGNOSIS — R42 VERTIGO: ICD-10-CM

## 2018-08-23 LAB
ANION GAP SERPL CALCULATED.3IONS-SCNC: 6 MMOL/L (ref 3–14)
BASOPHILS # BLD AUTO: 0 10E9/L (ref 0–0.2)
BASOPHILS NFR BLD AUTO: 0.2 %
BUN SERPL-MCNC: 23 MG/DL (ref 7–30)
CALCIUM SERPL-MCNC: 8.7 MG/DL (ref 8.5–10.1)
CHLORIDE SERPL-SCNC: 105 MMOL/L (ref 94–109)
CO2 SERPL-SCNC: 29 MMOL/L (ref 20–32)
CREAT SERPL-MCNC: 1.13 MG/DL (ref 0.52–1.04)
DIFFERENTIAL METHOD BLD: ABNORMAL
EOSINOPHIL # BLD AUTO: 0.2 10E9/L (ref 0–0.7)
EOSINOPHIL NFR BLD AUTO: 1.4 %
ERYTHROCYTE [DISTWIDTH] IN BLOOD BY AUTOMATED COUNT: 14.7 % (ref 10–15)
GFR SERPL CREATININE-BSD FRML MDRD: 45 ML/MIN/1.7M2
GLUCOSE SERPL-MCNC: 185 MG/DL (ref 70–99)
HCT VFR BLD AUTO: 37.9 % (ref 35–47)
HGB BLD-MCNC: 12.1 G/DL (ref 11.7–15.7)
IMM GRANULOCYTES # BLD: 0 10E9/L (ref 0–0.4)
IMM GRANULOCYTES NFR BLD: 0.2 %
LYMPHOCYTES # BLD AUTO: 2.2 10E9/L (ref 0.8–5.3)
LYMPHOCYTES NFR BLD AUTO: 17.9 %
MCH RBC QN AUTO: 28.4 PG (ref 26.5–33)
MCHC RBC AUTO-ENTMCNC: 31.9 G/DL (ref 31.5–36.5)
MCV RBC AUTO: 89 FL (ref 78–100)
MONOCYTES # BLD AUTO: 1.2 10E9/L (ref 0–1.3)
MONOCYTES NFR BLD AUTO: 10.1 %
NEUTROPHILS # BLD AUTO: 8.6 10E9/L (ref 1.6–8.3)
NEUTROPHILS NFR BLD AUTO: 70.2 %
NRBC # BLD AUTO: 0 10*3/UL
NRBC BLD AUTO-RTO: 0 /100
PLATELET # BLD AUTO: 252 10E9/L (ref 150–450)
POTASSIUM SERPL-SCNC: 4.3 MMOL/L (ref 3.4–5.3)
RBC # BLD AUTO: 4.26 10E12/L (ref 3.8–5.2)
SODIUM SERPL-SCNC: 140 MMOL/L (ref 133–144)
WBC # BLD AUTO: 12.2 10E9/L (ref 4–11)

## 2018-08-23 PROCEDURE — 93005 ELECTROCARDIOGRAM TRACING: CPT

## 2018-08-23 PROCEDURE — A9270 NON-COVERED ITEM OR SERVICE: HCPCS | Mod: GY | Performed by: EMERGENCY MEDICINE

## 2018-08-23 PROCEDURE — 25000132 ZZH RX MED GY IP 250 OP 250 PS 637: Mod: GY | Performed by: EMERGENCY MEDICINE

## 2018-08-23 PROCEDURE — 96360 HYDRATION IV INFUSION INIT: CPT

## 2018-08-23 PROCEDURE — 25000128 H RX IP 250 OP 636: Performed by: EMERGENCY MEDICINE

## 2018-08-23 PROCEDURE — 85025 COMPLETE CBC W/AUTO DIFF WBC: CPT | Performed by: EMERGENCY MEDICINE

## 2018-08-23 PROCEDURE — 80048 BASIC METABOLIC PNL TOTAL CA: CPT | Performed by: EMERGENCY MEDICINE

## 2018-08-23 PROCEDURE — 25000131 ZZH RX MED GY IP 250 OP 636 PS 637: Mod: GY | Performed by: EMERGENCY MEDICINE

## 2018-08-23 PROCEDURE — 99284 EMERGENCY DEPT VISIT MOD MDM: CPT | Mod: 25

## 2018-08-23 RX ORDER — CETIRIZINE HYDROCHLORIDE, PSEUDOEPHEDRINE HYDROCHLORIDE 5; 120 MG/1; MG/1
1 TABLET, FILM COATED, EXTENDED RELEASE ORAL ONCE
Status: COMPLETED | OUTPATIENT
Start: 2018-08-23 | End: 2018-08-23

## 2018-08-23 RX ORDER — MECLIZINE HYDROCHLORIDE 25 MG/1
25 TABLET ORAL ONCE
Status: COMPLETED | OUTPATIENT
Start: 2018-08-23 | End: 2018-08-23

## 2018-08-23 RX ORDER — CETIRIZINE HYDROCHLORIDE 10 MG/1
10 TABLET ORAL DAILY
Qty: 10 TABLET | Refills: 0 | Status: SHIPPED | OUTPATIENT
Start: 2018-08-23 | End: 2018-09-02

## 2018-08-23 RX ORDER — SODIUM CHLORIDE 9 MG/ML
1000 INJECTION, SOLUTION INTRAVENOUS CONTINUOUS
Status: DISCONTINUED | OUTPATIENT
Start: 2018-08-23 | End: 2018-08-23 | Stop reason: HOSPADM

## 2018-08-23 RX ORDER — MECLIZINE HYDROCHLORIDE 25 MG/1
25 TABLET ORAL EVERY 6 HOURS PRN
Qty: 30 TABLET | Refills: 1 | Status: SHIPPED | OUTPATIENT
Start: 2018-08-23 | End: 2020-01-01

## 2018-08-23 RX ADMIN — CETIRIZINE HCL, PSEUDOEPHEDRINE HCL 1 TABLET: 5; 120 TABLET, EXTENDED RELEASE ORAL at 17:15

## 2018-08-23 RX ADMIN — MECLIZINE HYDROCHLORIDE 25 MG: 25 TABLET ORAL at 17:15

## 2018-08-23 RX ADMIN — SODIUM CHLORIDE 500 ML: 9 INJECTION, SOLUTION INTRAVENOUS at 17:15

## 2018-08-23 ASSESSMENT — ENCOUNTER SYMPTOMS
WEAKNESS: 0
NAUSEA: 0
DIZZINESS: 1
BLOOD IN STOOL: 0
SHORTNESS OF BREATH: 0
VOMITING: 0
SPEECH DIFFICULTY: 0
ABDOMINAL PAIN: 0
DIARRHEA: 1
FEVER: 0
NUMBNESS: 0
HEADACHES: 0

## 2018-08-23 NOTE — ED PROVIDER NOTES
"  History     Chief Complaint:  Diarrhea and dizziness    HPI   Sheila Johnson is a 89 year old anticoagulated female on Plavix with a history of hyperlipidemia, hypertension, diabetes, CVA, C. Diff, and vertigo who presents to the emergency department with her children for evaluation of dizziness and diarrhea. 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 She was on Coumadin until her third stroke and then started on Plavix. The patient reports that for the last four days she has had 3-4 episodes of watery diarrhea daily, particularly after eating and she does not have any pain with it. She has had C. Diff three times in the past and states that this doesn't feel like C. Diff other than that the diarrhea sometimes \"shoots out\" which was similar to C. Diff. She also reports dizziness as if the room is spinning, that worsens when she moves side to side or while driving. The other day she \"turned too far down\" while petting her cat and states she would have fallen if she was standing. She has had dizziness like this before when she was diagnosed with vertigo but states it does not feel like her past strokes. She lives independently with the help of her children. The patient denies nausea, vomiting, headaches, blood in stool, fevers, numbness, weakness, vision changes, speech difficulty, abdominal pain, chest pain, shortness of breath, or recent use of antibiotics.     Allergies:  No Known Drug Allergies    Medications:    Tylenol  Atorvastatin  Citalopram hydrobromide  Plavix  Flonase  Lasix  Levemir flexpen  Lidocare patch  Calmoseptine  Singulair  Senna-docusate     Past Medical History:    Arthritis  AVB  CVA  Depressive disorder  Diabetes  Hyperlipidemia  Hypertension  LAFB  LBBB  Vertigo  C diff  Hypoxia  Aphasia  Back pain    Past Surgical History:    Orthopedic " surgery  Release carpal tunnel    Family History:    History reviewed. No pertinent family history.    Social History:  Former smoker  Negative for alcohol use.  Marital Status:        Review of Systems   Constitutional: Negative for fever.   Eyes: Negative for visual disturbance.   Respiratory: Negative for shortness of breath.    Cardiovascular: Negative for chest pain.   Gastrointestinal: Positive for diarrhea. Negative for abdominal pain, blood in stool, nausea and vomiting.   Neurological: Positive for dizziness. Negative for speech difficulty, weakness, numbness and headaches.   All other systems reviewed and are negative.    Physical Exam   First Vitals:  Patient Vitals for the past 24 hrs:   BP Temp Temp src Heart Rate Resp SpO2   08/23/18 2037 144/76 - - - - -   08/23/18 1800 - - - - - 97 %   08/23/18 1608 130/69 97.5  F (36.4  C) Oral 80 18 96 %       Physical Exam  General: Alert, interactive in mild distress  Head:  Scalp is atraumatic  Eyes:  The pupils are equal, round, and reactive to light    EOM's intact    No scleral icterus    Horizontal nystagmus with gaze to the right  ENT:      Nose:  The external nose is normal bilaterally   Ears:  External ears are normal. Middle ear effusion with no TM erythema  Mouth/Throat: The oropharynx is normal    Mucus membranes are moist       Neck:  Normal range of motion.      There is no rigidity.    Trachea is in the midline         CV:  Regular rate and rhythm    No murmur   Resp:  Breath sounds are clear bilaterally    Non-labored, no retractions or accessory muscle use      GI:  Abdomen is soft, no distension, no tenderness.       MS:  Normal strength in all 4 extremities  Skin:  Warm and dry, No rash or lesions noted.  Neuro: NIH stroke scale score is 0.     Strength 5/5 x4.  Sensation intact  In all 4 extremities.      Cranial nerves 2-12 grossly intact.    GCS: 15  Psych:  Awake. Alert.  Normal affect.      Appropriate interactions.    Emergency  Department Course   ECG:  @ 1640  Indication: Dizziness and diarrhea  Vent. Rate 77 bpm. NM interval 200 ms. QRS duration 152 ms. QT/QTc 488/552 ms. P-R-T axis 63 -38 91.   Atrial-sensed ventricular-paced rhythm. Abnormal ECG.  No significant change when compared to previous ECG from 4/27/18   Read @ 1646 by Dr. Lam    Laboratory:  CBC: WBC: 12.2 (H), HGB: 12.1, PLT: 252  BMP: Glucose 185 (H), Creatinine: 1.13 (H), GFR: 45 (L), o/w WNL    Interventions:  1715 0.9% Sodium Chloride BOLUS 500 mLs IV   1715 Antivert 25 mg tablet PO  1715 Zyrtec-D 5-120 mg tablet PO    Emergency Department Course:  1607 Nursing notes and vitals reviewed. I performed an exam of the patient as documented above.     IV inserted. Medicine administered as documented above. Blood drawn. This was sent to the lab for further testing, results above.    EKG was done, interpretation as above.    1833 I rechecked the patient and discussed the results of her workup thus far.     Findings and plan explained to the Patient. Patient discharged home with instructions regarding supportive care, medications, and reasons to return. The importance of close follow-up was reviewed. The patient was prescribed Antivert and Zyrtec.     I personally reviewed the laboratory results with the Patient and answered all related questions prior to discharge.     Impression & Plan    Medical Decision Making:  Ms. Johnson was seen and evaluated, the above workup was undertaken. Returning as largely unremarkable, findings are consistent with benign peripheral vertigo. She has middle ear effusion and horizontal nystagmus which fits clinically. There is no focal neurologic deficits to suggest stroke or intracranial hemorrhage. She is not a candidate for MRI given her pacemaker. After the medications above, she had resolution of her dizziness and was unable to produce a stool specimen here. Her abdominal exam is benign, I doubt acute intra abdominal pathology. I've  recommended stool sampling via her primary care provider. There are no signs of significant infection or sepsis syndrome. Patient as feeling much improved and requesting discharge. I think this is reasonable, she will follow up with her primary care provider and return here if new symptoms develop.     Diagnosis:    ICD-10-CM    1. Vertigo R42    2. Diarrhea, unspecified type R19.7        Disposition:  discharged to home    Discharge Medications:  Discharge Medication List as of 8/23/2018  8:33 PM      START taking these medications    Details   cetirizine (ZYRTEC) 10 MG tablet Take 1 tablet (10 mg) by mouth daily for 10 days, Disp-10 tablet, R-0, Local Print      meclizine (ANTIVERT) 25 MG tablet Take 1 tablet (25 mg) by mouth every 6 hours as needed for dizziness, Disp-30 tablet, R-1, Local Print           Scribe Disclosure:  I, Vinay Singh, am serving as a scribe on 8/23/2018 at 4:07 PM to personally document services performed by Dr. Genaro MD based on my observations and the provider's statements to me.       Vinay Singh  8/23/2018    EMERGENCY DEPARTMENT       William Lam MD  08/23/18 3861

## 2018-08-23 NOTE — ED AVS SNAPSHOT
Emergency Department    [address] [facility] [address]    Phone:  [phone]    Fax:  [phone]                                       Sheila Johnson   MRN: [id number]    Department:   Emergency Department   Date of Visit:  8/23/2018           After Visit Summary Signature Page     I have received my discharge instructions, and my questions have been answered. I have discussed any challenges I see with this plan with the nurse or doctor.    ..........................................................................................................................................  Patient/Patient Representative Signature      ..........................................................................................................................................  Patient Representative Print Name and Relationship to Patient    ..................................................               ................................................  Date                                            Time    ..........................................................................................................................................  Reviewed by Signature/Title    ...................................................              ..............................................  Date                                                            Time          22EPIC Rev 08/18

## 2018-08-23 NOTE — ED AVS SNAPSHOT
Emergency Department    [address] [facility] [address]    Phone:  [phone]    Fax:  [phone]                                       Sheila Johnson   MRN: [id number]    Department:   Emergency Department   Date of Visit:  8/23/2018           Patient Information     Date Of Birth          2/13/1924        Your diagnoses for this visit were:     Vertigo     Diarrhea, unspecified type        You were seen by William Lam MD.      Follow-up Information     Follow up with Thania Cadena In 3 days.    Specialty:  Internal Medicine    Contact information:    [facility]  [address]  [facility] [address]  [phone]          Follow up with  Emergency Department.    Specialty:  EMERGENCY MEDICINE    Why:  As needed, If symptoms worsen    Contact information:    [address] [facility] [id number] [phone]        Discharge Instructions       Discharge Instructions  Vertigo  You have been diagnosed with vertigo.  This is a dizzy feeling often described as spinning or that the room is moving around you. You will often have nausea (sick to your stomach), vomiting (throwing up), and balance problems with it.  Vertigo is usually caused by a problem in the inner ear which helps control your balance.  Many things can cause vertigo, including calcium collections in the inner ear, a virus infection of the inner ear, concussion, migraine, and some medicines.  Luckily, these causes are not life threatening and will eventually go away.  However, sometimes there is a serious problem that does not show up right away.  Generally, every Emergency Department visit should have a follow-up clinic visit with either a primary or a specialty clinic/provider. Please follow-up as instructed by your emergency provider today.  Return to the Emergency Department if you have:    New or severe headache.    Double vision (seeing two of things).    Trouble speaking  or hearing.    Weakness or trouble moving/using one side of your body.    Passing out.    Numbness or tingling.    Chest pain.    Vomiting that will not stop.    Treatment:    There are several commonly prescribed medications:  o Antihistamines such as meclizine (Antivert ), dimenhydrinate (Dramamine ), or diphenhydramine (Benadryl ).  o Prescription anti-nausea medicines, such as promethazine (Phenergan ), metoclopramide (Reglan ), or ondansetron (Zofran ).  o Prescription sedative medicines, such as diazepam (Valium ), lorazepam (Ativan ), or clonazepam (Klonopin ).    Most of these medicines make you sleepy, and you should not take them before you work or drive. You should only take prescription medicines to treat severe vertigo symptoms, and you should stop the medicine when your symptoms improve.    Follow Up:    If you have vertigo longer than three days, it is important that you follow up either with your primary provider or an Ear, Nose, and Throat (ENT) specialist.  You may need further testing to evaluate your vertigo and you may also need  vestibular  therapy which is a special form of physical therapy to make the vertigo go away.    If you were given a prescription for medicine here today, be sure to read all of the information (including the package insert) that comes with your prescription.  This will include important information about the medicine, its side effects, and any warnings that you need to know about.  The pharmacist who fills the prescription can provide more information and answer questions you may have about the medicine.  If you have questions or concerns that the pharmacist cannot address, please call or return to the Emergency Department.     Remember that you can always come back to the Emergency Department if you are not able to see your regular provider in the amount of time listed above, if you get any new symptoms, or if there is anything that worries you.    Treating  Diarrhea    Diarrhea happens when you have loose, watery, or frequent bowel movements. It is a common problem with many causes. Most cases of diarrhea clear up on their own. But certain cases may need treatment. Be sure to see your healthcare provider if your symptoms do not improve within a few days.  Getting relief  Treatment of diarrhea depends on its cause. Diarrhea caused by bacterial or parasite infection is often treated with antibiotics. Diarrhea caused by other factors, such as a stomach virus, often improves with simple home treatment. The tips below may also help relieve your symptoms.    Drink plenty of fluids. This helps prevent too much fluid loss (dehydration). Water, clear soups, and electrolyte solutions are good choices. Avoid alcohol, coffee, tea, and milk. These can irritate your intestines and make symptoms worse.    Suck on ice chips if drinking makes you queasy.    Return to your normal diet slowly. You may want to eat bland foods at first, such as rice and toast. Also, you may need to avoid certain foods for a while, such as dairy products. These can make symptoms worse. Ask your healthcare provider if there are any other foods you should avoid.    If you were prescribed antibiotics, take them as directed.    Do not take anti-diarrhea medicines without asking your healthcare provider first.  Call your healthcare provider   Call your healthcare provider if you have any of the following:     A fever of 100.4 F (38.0 C) or higher, or as directed by your healthcare provider    Severe pain    Worsening diarrhea or diarrhea for more than 2 days    Bloody vomit or stool    Signs of dehydration (dizziness, dry mouth and tongue, rapid pulse, dark urine)  Date Last Reviewed: 7/1/2016 2000-2017 [employer]. [address] [place] [address], [place] [address]. All rights reserved. This information is not intended as a substitute for professional medical care. Always follow your healthcare  professional's instructions.          Your next 10 appointments already scheduled     Oct 05, 2018  1:00 PM CDT   Remote PPM Check with BAEZ TECH1   [facility] ([facility])    [address] [facility] [address]  [place] [address] [phone] OPT 2           This appointment is for a remote check of your pacemaker.  This is not an appointment at the office.              24 Hour Appointment Hotline       To make an appointment at any [facility], call [phone] ([phone]). If you don't have a family doctor or clinic, we will help you find one. [place] clinics are conveniently located to serve the needs of you and your family.             Review of your medicines      START taking        Dose / Directions Last dose taken    cetirizine 10 MG tablet   Commonly known as:  zyrTEC   Dose:  10 mg   Quantity:  10 tablet        Take 1 tablet (10 mg) by mouth daily for 10 days   Refills:  0        meclizine 25 MG tablet   Commonly known as:  ANTIVERT   Dose:  25 mg   Quantity:  30 tablet        Take 1 tablet (25 mg) by mouth every 6 hours as needed for dizziness   Refills:  1          Our records show that you are taking the medicines listed below. If these are incorrect, please call your family doctor or clinic.        Dose / Directions Last dose taken    acetaminophen 500 MG tablet   Commonly known as:  TYLENOL   Dose:  1000 mg   Quantity:  1 Bottle        Take 2 tablets (1,000 mg) by mouth 3 times daily   Refills:  0        ATORVASTATIN CALCIUM PO   Dose:  40 mg        Take 40 mg by mouth daily.   Refills:  0        CITALOPRAM HYDROBROMIDE PO   Dose:  20 mg        Take 20 mg by mouth daily.   Refills:  0        clopidogrel 75 MG tablet   Commonly known as:  PLAVIX   Dose:  75 mg        Take 75 mg by mouth daily   Refills:  0        fluticasone 50 MCG/ACT spray   Commonly known as:  FLONASE   Dose:  2 spray        Spray 2 sprays into both nostrils daily    Refills:  0        furosemide 20 MG tablet   Commonly known as:  LASIX   Dose:  20 mg        Take 20 mg by mouth daily   Refills:  0        LEVEMIR FLEXPEN/FLEXTOUCH 100 UNIT/ML injection   Dose:  46 Units   Generic drug:  insulin detemir        Inject 46 Units Subcutaneous every morning   Refills:  0        Lidocaine 4 % Patch   Commonly known as:  LIDOCARE   Dose:  1 patch   Quantity:  30 patch        Place 1 patch onto the skin every 24 hours   Refills:  0        menthol-zinc oxide 0.44-20.625 % Oint ointment   Commonly known as:  CALMOSEPTINE        Apply topically 2 times daily as needed for skin protection   Refills:  0        order for DME   Quantity:  3 Device        [employer] Phone #[phone] Fax #[phone] Ready To Wear Knee High Compression Stockings 20-30 mmHg  May need carlene and liner  Length of Need: Life Time # of Pairs 3   Refills:  0        senna-docusate 8.6-50 MG per tablet   Commonly known as:  SENOKOT-S;PERICOLACE   Dose:  1 tablet   Quantity:  100 tablet        Take 1 tablet by mouth 2 times daily   Refills:  0        SINGULAIR PO   Dose:  10 mg        Take 10 mg by mouth At Bedtime   Refills:  0                Prescriptions were sent or printed at these locations (2 Prescriptions)                   Other Prescriptions                Printed at Department/Unit printer (2 of 2)         cetirizine (ZYRTEC) 10 MG tablet               meclizine (ANTIVERT) 25 MG tablet                Procedures and tests performed during your visit     Basic metabolic panel    CBC with platelets differential    EKG 12-lead, tracing only    Peripheral IV: Standard      Orders Needing Specimen Collection     Ordered          08/23/18 9164  Enteric Bacteria and Virus Panel by UZAIR Stool - STAT, Prio: STAT, Needs to be Collected     Scheduled Task Status   08/23/18 1278 Collect Enteric Bacteria and Virus Panel by UZAIR Stool Open   Order Class:  PCU Collect                08/23/18 4102   Clostridium difficile toxin B PCR - ROUTINE, Prio: Routine, Needs to be Collected     Scheduled Task Status   08/23/18 1645 Collect Clostridium difficile toxin B PCR Open   Order Class:  PCU Collect                  Pending Results     No orders found from 8/21/2018 to 8/24/2018.            Pending Culture Results     No orders found from 8/21/2018 to 8/24/2018.            Pending Results Instructions     If you had any lab results that were not finalized at the time of your Discharge, you can call the ED Lab Result RN at [phone]. You will be contacted by this team for any positive Lab results or changes in treatment. The nurses are available 7 days a week from 10A to 6:30P.  You can leave a message 24 hours per day and they will return your call.        Test Results From Your Hospital Stay        8/23/2018  4:41 PM      Component Results     Component Value Ref Range & Units Status    WBC 12.2 (H) 4.0 - 11.0 10e9/L Final    RBC Count 4.26 3.8 - 5.2 10e12/L Final    Hemoglobin 12.1 11.7 - 15.7 g/dL Final    Hematocrit 37.9 35.0 - 47.0 % Final    MCV 89 78 - 100 fl Final    MCH 28.4 26.5 - 33.0 pg Final    MCHC 31.9 31.5 - 36.5 g/dL Final    RDW 14.7 10.0 - 15.0 % Final    Platelet Count 252 150 - 450 10e9/L Final    Diff Method Automated Method  Final    % Neutrophils 70.2 % Final    % Lymphocytes 17.9 % Final    % Monocytes 10.1 % Final    % Eosinophils 1.4 % Final    % Basophils 0.2 % Final    % Immature Granulocytes 0.2 % Final    Nucleated RBCs 0 0 /100 Final    Absolute Neutrophil 8.6 (H) 1.6 - 8.3 10e9/L Final    Absolute Lymphocytes 2.2 0.8 - 5.3 10e9/L Final    Absolute Monocytes 1.2 0.0 - 1.3 10e9/L Final    Absolute Eosinophils 0.2 0.0 - 0.7 10e9/L Final    Absolute Basophils 0.0 0.0 - 0.2 10e9/L Final    Abs Immature Granulocytes 0.0 0 - 0.4 10e9/L Final    Absolute Nucleated RBC 0.0  Final         8/23/2018  5:09 PM      Component Results     Component Value Ref Range & Units Status    Sodium 140  133 - 144 mmol/L Final    Potassium 4.3 3.4 - 5.3 mmol/L Final    Specimen slightly hemolyzed, potassium may be falsely elevated    Chloride 105 94 - 109 mmol/L Final    Carbon Dioxide 29 20 - 32 mmol/L Final    Anion Gap 6 3 - 14 mmol/L Final    Glucose 185 (H) 70 - 99 mg/dL Final    Urea Nitrogen 23 7 - 30 mg/dL Final    Creatinine 1.13 (H) 0.52 - 1.04 mg/dL Final    GFR Estimate 45 (L) >60 mL/min/1.7m2 Final    Non  GFR Calc    GFR Estimate If Black 54 (L) >60 mL/min/1.7m2 Final    African American GFR Calc    Calcium 8.7 8.5 - 10.1 mg/dL Final                Clinical Quality Measure: Blood Pressure Screening     Your blood pressure was checked while you were in the emergency department today. The last reading we obtained was  BP: 130/69 . Please read the guidelines below about what these numbers mean and what you should do about them.  If your systolic blood pressure (the top number) is less than 120 and your diastolic blood pressure (the bottom number) is less than 80, then your blood pressure is normal. There is nothing more that you need to do about it.  If your systolic blood pressure (the top number) is 120-139 or your diastolic blood pressure (the bottom number) is 80-89, your blood pressure may be higher than it should be. You should have your blood pressure rechecked within a year by a primary care provider.  If your systolic blood pressure (the top number) is 140 or greater or your diastolic blood pressure (the bottom number) is 90 or greater, you may have high blood pressure. High blood pressure is treatable, but if left untreated over time it can put you at risk for heart attack, stroke, or kidney failure. You should have your blood pressure rechecked by a primary care provider within the next 4 weeks.  If your provider in the emergency department today gave you specific instructions to follow-up with your doctor or provider even sooner than that, you should follow that instruction and  "not wait for up to 4 weeks for your follow-up visit.        Thank you for choosing [facility]       Thank you for choosing [facility] for your care. Our goal is always to provide you with excellent care. Hearing back from our patients is one way we can continue to improve our services. Please take a few minutes to complete the written survey that you may receive in the mail after you visit with us. Thank you!        [facility] Information     [facility] lets you send messages to your doctor, view your test results, renew your prescriptions, schedule appointments and more. To sign up, go to [email or web address] . Click on \"Log in\" on the left side of the screen, which will take you to the Welcome page. Then click on \"Sign up Now\" on the right side of the page.     You will be asked to enter the access code listed below, as well as some personal information. Please follow the directions to create your username and password.     Your access code is: [email or web address]  Expires: 2018  8:33 PM     Your access code will  in 90 days. If you need help or a new code, please call your [facility] clinic or [phone].        Care EveryWhere ID     This is your Care EveryWhere ID. This could be used by other organizations to access your [facility] medical records  [id number]        Equal Access to Services     STEVIE BENNETT : Vin Figueredo, waaxda luqadaha, qaybta kaalmada nadine, rhina mclean . So [facility] [phone].    ATENCIÓN: Si habla español, tiene a junior disposición servicios gratuitos de asistencia lingüística. Llame al [phone].    We comply with applicable federal civil rights laws and [place] laws. We do not discriminate on the basis of race, color, national origin, age, disability, sex, sexual orientation, or gender identity.            After Visit Summary       This is your record. Keep this with you and show to your community pharmacist(s) and doctor(s) at your next " visit.

## 2018-08-24 NOTE — DISCHARGE INSTRUCTIONS
Discharge Instructions  Vertigo  You have been diagnosed with vertigo.  This is a dizzy feeling often described as spinning or that the room is moving around you. You will often have nausea (sick to your stomach), vomiting (throwing up), and balance problems with it.  Vertigo is usually caused by a problem in the inner ear which helps control your balance.  Many things can cause vertigo, including calcium collections in the inner ear, a virus infection of the inner ear, concussion, migraine, and some medicines.  Luckily, these causes are not life threatening and will eventually go away.  However, sometimes there is a serious problem that does not show up right away.  Generally, every Emergency Department visit should have a follow-up clinic visit with either a primary or a specialty clinic/provider. Please follow-up as instructed by your emergency provider today.  Return to the Emergency Department if you have:    New or severe headache.    Double vision (seeing two of things).    Trouble speaking or hearing.    Weakness or trouble moving/using one side of your body.    Passing out.    Numbness or tingling.    Chest pain.    Vomiting that will not stop.    Treatment:    There are several commonly prescribed medications:  o Antihistamines such as meclizine (Antivert ), dimenhydrinate (Dramamine ), or diphenhydramine (Benadryl ).  o Prescription anti-nausea medicines, such as promethazine (Phenergan ), metoclopramide (Reglan ), or ondansetron (Zofran ).  o Prescription sedative medicines, such as diazepam (Valium ), lorazepam (Ativan ), or clonazepam (Klonopin ).    Most of these medicines make you sleepy, and you should not take them before you work or drive. You should only take prescription medicines to treat severe vertigo symptoms, and you should stop the medicine when your symptoms improve.    Follow Up:    If you have vertigo longer than three days, it is important that you follow up either with your primary  provider or an Ear, Nose, and Throat (ENT) specialist.  You may need further testing to evaluate your vertigo and you may also need  vestibular  therapy which is a special form of physical therapy to make the vertigo go away.    If you were given a prescription for medicine here today, be sure to read all of the information (including the package insert) that comes with your prescription.  This will include important information about the medicine, its side effects, and any warnings that you need to know about.  The pharmacist who fills the prescription can provide more information and answer questions you may have about the medicine.  If you have questions or concerns that the pharmacist cannot address, please call or return to the Emergency Department.     Remember that you can always come back to the Emergency Department if you are not able to see your regular provider in the amount of time listed above, if you get any new symptoms, or if there is anything that worries you.    Treating Diarrhea    Diarrhea happens when you have loose, watery, or frequent bowel movements. It is a common problem with many causes. Most cases of diarrhea clear up on their own. But certain cases may need treatment. Be sure to see your healthcare provider if your symptoms do not improve within a few days.  Getting relief  Treatment of diarrhea depends on its cause. Diarrhea caused by bacterial or parasite infection is often treated with antibiotics. Diarrhea caused by other factors, such as a stomach virus, often improves with simple home treatment. The tips below may also help relieve your symptoms.    Drink plenty of fluids. This helps prevent too much fluid loss (dehydration). Water, clear soups, and electrolyte solutions are good choices. Avoid alcohol, coffee, tea, and milk. These can irritate your intestines and make symptoms worse.    Suck on ice chips if drinking makes you queasy.    Return to your normal diet slowly. You may  want to eat bland foods at first, such as rice and toast. Also, you may need to avoid certain foods for a while, such as dairy products. These can make symptoms worse. Ask your healthcare provider if there are any other foods you should avoid.    If you were prescribed antibiotics, take them as directed.    Do not take anti-diarrhea medicines without asking your healthcare provider first.  Call your healthcare provider   Call your healthcare provider if you have any of the following:     A fever of 100.4 F (38.0 C) or higher, or as directed by your healthcare provider    Severe pain    Worsening diarrhea or diarrhea for more than 2 days    Bloody vomit or stool    Signs of dehydration (dizziness, dry mouth and tongue, rapid pulse, dark urine)  Date Last Reviewed: 7/1/2016 2000-2017 [employer]. [address] [place] [address], [place] [address]. All rights reserved. This information is not intended as a substitute for professional medical care. Always follow your healthcare professional's instructions.

## 2018-10-05 ENCOUNTER — ALLIED HEALTH/NURSE VISIT (OUTPATIENT)
Dept: CARDIOLOGY | Facility: CLINIC | Age: 83
End: 2018-10-05
Payer: COMMERCIAL

## 2018-10-05 DIAGNOSIS — Z95.0 CARDIAC PACEMAKER IN SITU: Primary | ICD-10-CM

## 2018-10-05 PROCEDURE — 93294 REM INTERROG EVL PM/LDLS PM: CPT | Performed by: INTERNAL MEDICINE

## 2018-10-05 PROCEDURE — 93296 REM INTERROG EVL PM/IDS: CPT | Performed by: INTERNAL MEDICINE

## 2018-10-05 NOTE — PROGRESS NOTES
Biotronik Betina (D) Remote PPM Device Check  AP: 6 % : 88 %  Mode: DDD        Presenting Rhythm: AP/, AS/  Heart Rate: Adequate rates per histogram  Sensing: Stable    Pacing Threshold: Stable    Impedance: Stable  Battery Status: 95% battery life remaining  Atrial Arrhythmia: None  Ventricular Arrhythmia: None     Care Plan: F/u PPM Biotronik q 3 months. Gave patient results over the phone. Felice,CVT

## 2018-10-05 NOTE — MR AVS SNAPSHOT
After Visit Summary   10/5/2018    Sheila Johnson    MRN: [id number]           Patient Information     Date Of Birth          2/13/1924        Visit Information        Provider Department      10/5/2018 1:00 PM [facility] [address]        Today's Diagnoses     Cardiac pacemaker in situ    -  1       Follow-ups after your visit        Your next 10 appointments already scheduled     Oct 05, 2018  1:00 PM CDT   Remote PPM Check with [facility]AE[facility] TECH1   [facility] ([facility])    [address] [id number] [phone] OPT 2           This appointment is for a remote check of your pacemaker.  This is not an appointment at the office.            Jan 04, 2019  8:00 AM CST   Remote PPM Check with BAEZ TECH1   [facility] ([facility])    [address] [id number] [phone] OPT 2           This appointment is for a remote check of your pacemaker.  This is not an appointment at the office.              Who to contact     If you have questions or need follow up information about today's clinic visit or your schedule please contact [facility] directly at [phone].  Normal or non-critical lab and imaging results will be communicated to you by MyChart, letter or phone within 4 business days after the clinic has received the results. If you do not hear from us within 7 days, please contact the clinic through [employer] or phone. If you have a critical or abnormal lab result, we will notify you by phone as soon as possible.  Submit refill requests through [employer] or call your pharmacy and they will forward the refill request to us. Please allow 3 business days for your refill to be completed.          Additional Information About Your Visit        [email or web address] Information     [employer] lets you send  "messages to your doctor, view your test results, renew your prescriptions, schedule appointments and more. To sign up, go to [email or web address] . Click on \"Log in\" on the left side of the screen, which will take you to the Welcome page. Then click on \"Sign up Now\" on the right side of the page.     You will be asked to enter the access code listed below, as well as some personal information. Please follow the directions to create your username and password.     Your access code is: [email or web address]  Expires: 2018  8:33 PM     Your access code will  in 90 days. If you need help or a new code, please call your [place] clinic or [phone].        Care EveryWhere ID     This is your Care EveryWhere ID. This could be used by other organizations to access your [place] medical records  [id number]         Blood Pressure from Last 3 Encounters:   18 144/76   18 169/66   18 159/73    Weight from Last 3 Encounters:   18 99.8 kg (220 lb)   18 99.8 kg (220 lb)   13 107.1 kg (236 lb 1.8 oz)              We Performed the Following     INTERROGATION DEVICE EVAL REMOTE, PACER/ICD (68309)     PM DEVICE INTERROGATE REMOTE (81264)        Primary Care Provider Office Phone # Fax #    Thania Cadena [phone]       [facility] [address] [facility] [address]  [facility] [address]        Equal Access to Services     STEVIE BENNETT : Hadii lena oakley hadasho Soshubham, waaxda luqadaha, qaybta kaalmada nadine, rhina guzman. So [facility] [phone].    ATENCIÓN: Si habla español, tiene a junior disposición servicios gratuitos de asistencia lingüística. Llame al [phone].    We comply with applicable federal civil rights laws and [place] laws. We do not discriminate on the basis of race, color, national origin, age, disability, sex, sexual orientation, or gender identity.            Thank you!     Thank you for choosing [facility]" [facility]   [place]  for your care. Our goal is always to provide you with excellent care. Hearing back from our patients is one way we can continue to improve our services. Please take a few minutes to complete the written survey that you may receive in the mail after your visit with us. Thank you!             Your Updated Medication List - Protect others around you: Learn how to safely use, store and throw away your medicines at www.disposemymeds.org.          This list is accurate as of 10/5/18  9:02 AM.  Always use your most recent med list.                   Brand Name Dispense Instructions for use Diagnosis    acetaminophen 500 MG tablet    TYLENOL    1 Bottle    Take 2 tablets (1,000 mg) by mouth 3 times daily    Acute left-sided low back pain without sciatica       ATORVASTATIN CALCIUM PO      Take 40 mg by mouth daily.        CITALOPRAM HYDROBROMIDE PO      Take 20 mg by mouth daily.        clopidogrel 75 MG tablet    PLAVIX     Take 75 mg by mouth daily        fluticasone 50 MCG/ACT spray    FLONASE     Spray 2 sprays into both nostrils daily        furosemide 20 MG tablet    LASIX     Take 20 mg by mouth daily        LEVEMIR FLEXPEN/FLEXTOUCH 100 UNIT/ML injection   Generic drug:  insulin detemir      Inject 46 Units Subcutaneous every morning        Lidocaine 4 % Patch    LIDOCARE    30 patch    Place 1 patch onto the skin every 24 hours    Acute left-sided low back pain without sciatica       meclizine 25 MG tablet    ANTIVERT    30 tablet    Take 1 tablet (25 mg) by mouth every 6 hours as needed for dizziness        menthol-zinc oxide 0.44-20.625 % Oint ointment    CALMOSEPTINE     Apply topically 2 times daily as needed for skin protection        order for DME     3 Device    [employer] Phone #[phone] Fax #[phone] Ready To Wear Knee High Compression Stockings 20-30 mmHg  May need carlene and liner  Length of Need: Life Time # of Pairs 3    Open wound of knee, leg, and ankle,  unspecified laterality, subsequent encounter, Swelling of limb, Localized edema, Lymphedema of both lower extremities, Chronic ulcer of ankle, unspecified laterality, 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), Venous ulcer of ankle, unspecified laterality (H)       senna-docusate 8.6-50 MG per tablet    SENOKOT-S;PERICOLACE    100 tablet    Take 1 tablet by mouth 2 times daily    Constipation, unspecified constipation type       SINGULAIR PO      Take 10 mg by mouth At Bedtime

## 2018-12-18 NOTE — PROGRESS NOTES
[employer] (D) Pacemaker Device Check 6 wks post implant  AP: 10 % : 87 %  Mode: DDD60/130        Underlying Rhythm: Sinus with a long 1st degree and intermittent 2nd degree AVB  Heart Rate: Stable with good variability  Sensing: WNL    Pacing Threshold: Atrial lead slightly elevated. RV= WNL   Impedance: WNL  Battery Status: Full  Device Site: Well healed, no discoloration  Atrial Arrhythmia: 1 episode of A flutter lasting 24 minutes- previously message Bethany on. Pt currently on Plavix  Ventricular Arrhythmia: 0  Setting Change: lowered outputs per protocol    Care Plan: Begin Q 3 month remote transmission.Pt is coming back in August to see Bethany.TONE Hernadez                Stroke-like symptoms

## 2019-01-04 ENCOUNTER — ANCILLARY PROCEDURE (OUTPATIENT)
Dept: CARDIOLOGY | Facility: CLINIC | Age: 84
End: 2019-01-04
Attending: INTERNAL MEDICINE
Payer: MEDICARE

## 2019-01-04 DIAGNOSIS — I44.30 AVB (ATRIOVENTRICULAR BLOCK): ICD-10-CM

## 2019-01-04 PROCEDURE — 93296 REM INTERROG EVL PM/IDS: CPT | Performed by: INTERNAL MEDICINE

## 2019-01-04 PROCEDURE — 93294 REM INTERROG EVL PM/LDLS PM: CPT | Performed by: INTERNAL MEDICINE

## 2019-04-05 ENCOUNTER — ANCILLARY PROCEDURE (OUTPATIENT)
Dept: CARDIOLOGY | Facility: CLINIC | Age: 84
End: 2019-04-05
Payer: MEDICARE

## 2019-04-05 DIAGNOSIS — I44.30 AVB (ATRIOVENTRICULAR BLOCK): ICD-10-CM

## 2019-04-05 LAB
MDC_IDC_LEAD_IMPLANT_DT: NORMAL
MDC_IDC_LEAD_IMPLANT_DT: NORMAL
MDC_IDC_LEAD_LOCATION: NORMAL
MDC_IDC_LEAD_LOCATION: NORMAL
MDC_IDC_LEAD_LOCATION_DETAIL_1: NORMAL
MDC_IDC_LEAD_LOCATION_DETAIL_1: NORMAL
MDC_IDC_LEAD_MFG: NORMAL
MDC_IDC_LEAD_MFG: NORMAL
MDC_IDC_LEAD_MODEL: NORMAL
MDC_IDC_LEAD_MODEL: NORMAL
MDC_IDC_LEAD_POLARITY_TYPE: NORMAL
MDC_IDC_LEAD_POLARITY_TYPE: NORMAL
MDC_IDC_LEAD_SERIAL: NORMAL
MDC_IDC_LEAD_SERIAL: NORMAL
MDC_IDC_MSMT_BATTERY_REMAINING_PERCENTAGE: 90 %
MDC_IDC_MSMT_BATTERY_STATUS: NORMAL
MDC_IDC_MSMT_LEADCHNL_RA_IMPEDANCE_VALUE: 586 OHM
MDC_IDC_MSMT_LEADCHNL_RA_LEAD_CHANNEL_STATUS: NORMAL
MDC_IDC_MSMT_LEADCHNL_RV_IMPEDANCE_VALUE: 598 OHM
MDC_IDC_MSMT_LEADCHNL_RV_LEAD_CHANNEL_STATUS: NORMAL
MDC_IDC_PG_IMPLANT_DTM: NORMAL
MDC_IDC_PG_MFG: NORMAL
MDC_IDC_PG_MODEL: NORMAL
MDC_IDC_PG_SERIAL: NORMAL
MDC_IDC_PG_TYPE: NORMAL
MDC_IDC_SESS_CLINIC_NAME: NORMAL
MDC_IDC_SESS_DTM: NORMAL
MDC_IDC_SESS_REPROGRAMMED: NO
MDC_IDC_SESS_TYPE: NORMAL
MDC_IDC_SET_BRADY_AT_MODE_SWITCH_MODE: NORMAL
MDC_IDC_SET_BRADY_AT_MODE_SWITCH_RATE: 160 {BEATS}/MIN
MDC_IDC_SET_BRADY_LOWRATE: 60 {BEATS}/MIN
MDC_IDC_SET_BRADY_MAX_SENSOR_RATE: 120 {BEATS}/MIN
MDC_IDC_SET_BRADY_MAX_TRACKING_RATE: 130 {BEATS}/MIN
MDC_IDC_SET_BRADY_MODE: NORMAL
MDC_IDC_SET_BRADY_PAV_DELAY_HIGH: 160 MS
MDC_IDC_SET_BRADY_PAV_DELAY_LOW: 200 MS
MDC_IDC_SET_BRADY_SAV_DELAY_HIGH: 130 MS
MDC_IDC_SET_BRADY_SAV_DELAY_LOW: 170 MS
MDC_IDC_SET_LEADCHNL_RA_PACING_AMPLITUDE: 2.6 V
MDC_IDC_SET_LEADCHNL_RA_PACING_POLARITY: NORMAL
MDC_IDC_SET_LEADCHNL_RA_PACING_PULSEWIDTH: 0.4 MS
MDC_IDC_SET_LEADCHNL_RA_SENSING_ADAPTATION_MODE: NORMAL
MDC_IDC_SET_LEADCHNL_RA_SENSING_POLARITY: NORMAL
MDC_IDC_SET_LEADCHNL_RV_PACING_AMPLITUDE: 2.4 V
MDC_IDC_SET_LEADCHNL_RV_PACING_POLARITY: NORMAL
MDC_IDC_SET_LEADCHNL_RV_PACING_PULSEWIDTH: 0.4 MS
MDC_IDC_SET_LEADCHNL_RV_SENSING_ADAPTATION_MODE: NORMAL
MDC_IDC_SET_LEADCHNL_RV_SENSING_POLARITY: NORMAL
MDC_IDC_STAT_AT_BURDEN_PERCENT: 0 %
MDC_IDC_STAT_AT_DTM_END: NORMAL
MDC_IDC_STAT_AT_DTM_START: NORMAL
MDC_IDC_STAT_AT_MODE_SW_COUNT_PER_DAY: 0
MDC_IDC_STAT_AT_MODE_SW_PERCENT_TIME_PER_DAY: 0 %
MDC_IDC_STAT_BRADY_AP_VP_PERCENT: 14 %
MDC_IDC_STAT_BRADY_AP_VS_PERCENT: 0 %
MDC_IDC_STAT_BRADY_AS_VP_PERCENT: 85 %
MDC_IDC_STAT_BRADY_AS_VS_PERCENT: 0 %
MDC_IDC_STAT_BRADY_DTM_END: NORMAL
MDC_IDC_STAT_BRADY_DTM_START: NORMAL
MDC_IDC_STAT_BRADY_RA_PERCENT_PACED: 14 %
MDC_IDC_STAT_BRADY_RV_PERCENT_PACED: 100 %
MDC_IDC_STAT_CRT_DTM_END: NORMAL
MDC_IDC_STAT_CRT_DTM_START: NORMAL

## 2019-04-05 PROCEDURE — 93296 REM INTERROG EVL PM/IDS: CPT | Performed by: INTERNAL MEDICINE

## 2019-04-05 PROCEDURE — 93294 REM INTERROG EVL PM/LDLS PM: CPT | Performed by: INTERNAL MEDICINE

## 2019-06-05 NOTE — PLAN OF CARE
Chief complaint:   Chief Complaint   Patient presents with   • Nursing Home     Fever       Vitals:  Visit Vitals  Pulse 108   Temp (!) 102.2 °F (39 °C)   SpO2 95% Comment: 1 l       HISTORY OF PRESENT ILLNESS     HPI  Cheryl is a 77 year old female who resides at [facility] long term.  Today her aid noticed she was not breathing right.  Nurse wanted writer to look at her.  Nurse reports vitals were taken and normal.  When I went into the room the patient was diaphoretic and was warm to the touch.  Temp 102.2.  Just had vicodin.  Nursing staff gave additional tylenol per my order and placed cool rags on her.  Has indwelling catheter that was changed last night.  Cheryl reports her she feels SOB but has no cough.  Denies any acute URI type symptoms.  No back pain or N/V.  Last BM was last night. Stools have been soft.  No abd pain. No increase bladder spasms or pain.  Patient is a DNR.     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 (CMS/[facility]) 08/18/2015     Priority: Low   • Generalized weakness 04/01/2015     Priority: Low   • Diarrhea 04/01/2015     Priority: Low   • Acute UTI 04/01/2015     Priority: Low   • Pressure ulcer 07/10/2014     Priority: Low   • Atonic neurogenic bladders 07/01/2014     Priority: Low   • Chronic kidney disease, stage III (moderate) (CMS/[facility]) 07/01/2014     Priority: Low   • Other and unspecified hyperlipidemia 01/17/2014     Priority: Low   • Osteoporosis 11/26/2012     Priority: Low   •  Problem: Patient Care Overview  Goal: Plan of Care/Patient Progress Review  Outcome: Improving  Pt alert and oriented without neuro changes. VSS, although BP running on the high side. PRN meds for BP ordered but not given as BP did not meet parameters. Pacer site has scant amt of blood on dressing, no hematoma. Pt c/o slight pain at pacer site with movement other wise no c/o pain. Plan is to continue to monitor overnight with cxr and pacer check in a.m. Possible discharge in the next day or two if remains stable.        Decubitus ulcer of coccyx 05/10/2012     Priority: Low   • Depressive disorder, not elsewhere classified 04/20/2012     Priority: Low   • Long term (current) use of anticoagulants 11/16/2011     Priority: Low   • DVT (deep venous thrombosis) (CMS/[facility]) 08/24/2011     Priority: Low   • Basal cell carcinoma of eyelid 08/02/2011     Priority: Low   • Multiple sclerosis (CMS/[facility]) 08/02/2011     Priority: Low   • DM w/o complication type II 08/02/2011     Priority: Low       PAST MEDICAL, FAMILY AND SOCIAL HISTORY     Medications:  Current Outpatient Medications   Medication   • HYDROcodone-acetaminophen (NORCO) 5-325 MG per tablet   • insulin regular 70-30 (HUMULIN 70/30) (70-30) 100 UNIT/ML injectable suspension   • docusate sodium-sennosides (SENOKOT S) 50-8.6 MG per tablet   • acetaminophen (TYLENOL) 325 MG tablet   • sodium biphosphate (FLEET) 7-19 GM/118ML enema   • bisacodyl (DULCOLAX) 10 MG suppository   • ferrous sulfate 325 (65 FE) MG tablet   • polyethylene glycol (GLYCOLAX, MIRALAX) packet   • atorvastatin (LIPITOR) 80 MG tablet   • aspirin (ECOTRIN) 325 MG EC tablet   • guaiFENesin (MUCINEX) 600 MG 12 hr tablet   • escitalopram (LEXAPRO) 5 MG tablet   • magnesium hydroxide (MILK OF MAGNESIA) 400 MG/5ML suspension   • hydrALAZINE (APRESOLINE) 50 MG tablet   • omeprazole (PRILOSEC) 20 MG capsule   • Psyllium (METAMUCIL FIBER PO)   • amLODIPine (NORVASC) 10 MG tablet   • Cholecalciferol (VITAMIN D) 2000 UNITS Cap   • calcium carbonate-vitamin D (CALCIUM 600 + D) 600-400 MG-UNIT per tablet   • vitamin - therapeutic multivitamins w/minerals (CENTRUM SILVER,THERA-M) TABS     No current facility-administered medications for this visit.        Allergies:  ALLERGIES:   Allergen Reactions   • Fosamax PRURITUS   • Tizanidine Other (See Comments)     Severe bradycardia   • Bactrim [Sulfamethoxazole W/Trimethoprim] Other (See Comments)     Acute kidney injury   • Protonix PRURITUS     Per pt. report       Past Medical   History/Surgeries:  Past Medical History:   Diagnosis Date   • Allergy    • Anxiety    • Atonic neurogenic bladder    • Chronic kidney disease    • Diabetes mellitus (CMS/[facility])    • Duodenal ulcer 11/11/14   • DVT (deep venous thrombosis) (CMS/[facility]) @ 2001   • Essential (primary) hypertension    • Multiple sclerosis (CMS/[facility]) since age 23   • Osteoporosis    • Stroke (CMS/[facility])        REVIEW OF SYSTEMS     Review of Systems   Constitutional: Positive for fever.   HENT: Negative for congestion, ear pain, rhinorrhea and sore throat.    Respiratory: Positive for shortness of breath. Negative for cough.    Gastrointestinal: Negative for abdominal pain, nausea and vomiting.   Genitourinary: Negative for dysuria.   Musculoskeletal: Negative for back pain.   Neurological: Negative for headaches.       PHYSICAL EXAM     Physical Exam   Constitutional: She appears well-developed and well-nourished. She is cooperative. She appears ill.   Cardiovascular: Regular rhythm and normal heart sounds. Tachycardia present.   No edema   Pulmonary/Chest: Effort normal and breath sounds normal. Tachypnea noted.   Abdominal: Bowel sounds are normal. She exhibits distension. There is no tenderness.   abd is round and firm   Genitourinary:   Genitourinary Comments: Urine is cloudy with sediment    Neurological: She is alert.   Skin: No rash noted. She is diaphoretic.       ASSESSMENT/PLAN     Cheryl was seen today for nursing home.    Diagnoses and all orders for this visit:    Fever, unspecified fever cause      Stat CBC with diff, procalcitonin and Lactic acid  Stat UA and Chest xray     Will await results. Staff to recheck temp in 1 hour.

## 2019-06-27 ENCOUNTER — ANCILLARY PROCEDURE (OUTPATIENT)
Dept: CARDIOLOGY | Facility: CLINIC | Age: 84
End: 2019-06-27
Attending: INTERNAL MEDICINE
Payer: MEDICARE

## 2019-06-27 DIAGNOSIS — I44.30 AVB (ATRIOVENTRICULAR BLOCK): ICD-10-CM

## 2019-06-27 PROCEDURE — 93280 PM DEVICE PROGR EVAL DUAL: CPT | Performed by: INTERNAL MEDICINE

## 2019-07-01 LAB
MDC_IDC_LEAD_IMPLANT_DT: NORMAL
MDC_IDC_LEAD_IMPLANT_DT: NORMAL
MDC_IDC_LEAD_LOCATION: NORMAL
MDC_IDC_LEAD_LOCATION: NORMAL
MDC_IDC_LEAD_LOCATION_DETAIL_1: NORMAL
MDC_IDC_LEAD_LOCATION_DETAIL_1: NORMAL
MDC_IDC_LEAD_MFG: NORMAL
MDC_IDC_LEAD_MFG: NORMAL
MDC_IDC_LEAD_MODEL: NORMAL
MDC_IDC_LEAD_MODEL: NORMAL
MDC_IDC_LEAD_POLARITY_TYPE: NORMAL
MDC_IDC_LEAD_POLARITY_TYPE: NORMAL
MDC_IDC_LEAD_SERIAL: NORMAL
MDC_IDC_LEAD_SERIAL: NORMAL
MDC_IDC_MSMT_BATTERY_REMAINING_LONGEVITY: 100 MO
MDC_IDC_MSMT_BATTERY_STATUS: NORMAL
MDC_IDC_MSMT_LEADCHNL_RA_IMPEDANCE_VALUE: 565 OHM
MDC_IDC_MSMT_LEADCHNL_RA_PACING_THRESHOLD_AMPLITUDE: 0.8 V
MDC_IDC_MSMT_LEADCHNL_RA_PACING_THRESHOLD_PULSEWIDTH: 0.4 MS
MDC_IDC_MSMT_LEADCHNL_RA_SENSING_INTR_AMPL: 7 MV
MDC_IDC_MSMT_LEADCHNL_RV_IMPEDANCE_VALUE: 604 OHM
MDC_IDC_MSMT_LEADCHNL_RV_PACING_THRESHOLD_AMPLITUDE: 0.9 V
MDC_IDC_MSMT_LEADCHNL_RV_PACING_THRESHOLD_PULSEWIDTH: 0.4 MS
MDC_IDC_MSMT_LEADCHNL_RV_SENSING_INTR_AMPL: 10.6 MV
MDC_IDC_PG_IMPLANT_DTM: NORMAL
MDC_IDC_PG_MFG: NORMAL
MDC_IDC_PG_MODEL: NORMAL
MDC_IDC_PG_SERIAL: NORMAL
MDC_IDC_PG_TYPE: NORMAL
MDC_IDC_SESS_CLINIC_NAME: NORMAL
MDC_IDC_SESS_DTM: NORMAL
MDC_IDC_SESS_REPROGRAMMED: NORMAL
MDC_IDC_SESS_TYPE: NORMAL
MDC_IDC_SET_BRADY_AT_MODE_SWITCH_MODE: NORMAL
MDC_IDC_SET_BRADY_AT_MODE_SWITCH_RATE: 160 {BEATS}/MIN
MDC_IDC_SET_BRADY_HYSTRATE: 60 {BEATS}/MIN
MDC_IDC_SET_BRADY_LOWRATE: 60 {BEATS}/MIN
MDC_IDC_SET_BRADY_MAX_SENSOR_RATE: 120 {BEATS}/MIN
MDC_IDC_SET_BRADY_MAX_TRACKING_RATE: 130 {BEATS}/MIN
MDC_IDC_SET_BRADY_MODE: NORMAL
MDC_IDC_SET_BRADY_NIGHT_RATE: 60 {BEATS}/MIN
MDC_IDC_SET_BRADY_PAV_DELAY_HIGH: 160 MS
MDC_IDC_SET_BRADY_PAV_DELAY_LOW: 200 MS
MDC_IDC_SET_BRADY_SAV_DELAY_HIGH: 130 MS
MDC_IDC_SET_BRADY_SAV_DELAY_LOW: 170 MS
MDC_IDC_SET_CRT_PACED_CHAMBERS: NORMAL
MDC_IDC_SET_LEADCHNL_LV_PACING_ANODE_ELECTRODE_1: NORMAL
MDC_IDC_SET_LEADCHNL_LV_PACING_ANODE_LOCATION_1: NORMAL
MDC_IDC_SET_LEADCHNL_LV_PACING_CATHODE_ELECTRODE_1: NORMAL
MDC_IDC_SET_LEADCHNL_LV_PACING_CATHODE_LOCATION_1: NORMAL
MDC_IDC_SET_LEADCHNL_LV_PACING_POLARITY: NORMAL
MDC_IDC_SET_LEADCHNL_LV_SENSING_ANODE_ELECTRODE_1: NORMAL
MDC_IDC_SET_LEADCHNL_LV_SENSING_ANODE_LOCATION_1: NORMAL
MDC_IDC_SET_LEADCHNL_LV_SENSING_CATHODE_ELECTRODE_1: NORMAL
MDC_IDC_SET_LEADCHNL_LV_SENSING_CATHODE_LOCATION_1: NORMAL
MDC_IDC_SET_LEADCHNL_LV_SENSING_POLARITY: NORMAL
MDC_IDC_SET_LEADCHNL_RA_PACING_AMPLITUDE: 2 V
MDC_IDC_SET_LEADCHNL_RA_PACING_POLARITY: NORMAL
MDC_IDC_SET_LEADCHNL_RA_PACING_PULSEWIDTH: 0.4 MS
MDC_IDC_SET_LEADCHNL_RA_SENSING_ADAPTATION_MODE: NORMAL
MDC_IDC_SET_LEADCHNL_RA_SENSING_POLARITY: NORMAL
MDC_IDC_SET_LEADCHNL_RA_SENSING_SENSITIVITY: NORMAL
MDC_IDC_SET_LEADCHNL_RV_PACING_AMPLITUDE: 2.4 V
MDC_IDC_SET_LEADCHNL_RV_PACING_POLARITY: NORMAL
MDC_IDC_SET_LEADCHNL_RV_PACING_PULSEWIDTH: 0.4 MS
MDC_IDC_SET_LEADCHNL_RV_SENSING_ADAPTATION_MODE: NORMAL
MDC_IDC_SET_LEADCHNL_RV_SENSING_POLARITY: NORMAL
MDC_IDC_SET_LEADCHNL_RV_SENSING_SENSITIVITY: NORMAL
MDC_IDC_STAT_BRADY_DTM_END: NORMAL
MDC_IDC_STAT_BRADY_DTM_START: NORMAL
MDC_IDC_STAT_BRADY_RA_PERCENT_PACED: 6 %
MDC_IDC_STAT_BRADY_RV_PERCENT_PACED: 94 %

## 2019-09-12 ENCOUNTER — OFFICE VISIT (OUTPATIENT)
Dept: CARDIOLOGY | Facility: CLINIC | Age: 84
End: 2019-09-12
Attending: INTERNAL MEDICINE
Payer: MEDICARE

## 2019-09-12 VITALS
DIASTOLIC BLOOD PRESSURE: 72 MMHG | SYSTOLIC BLOOD PRESSURE: 121 MMHG | WEIGHT: 226 LBS | HEART RATE: 84 BPM | BODY MASS INDEX: 45.56 KG/M2 | HEIGHT: 59 IN

## 2019-09-12 DIAGNOSIS — I44.30 AVB (ATRIOVENTRICULAR BLOCK): ICD-10-CM

## 2019-09-12 PROCEDURE — 99214 OFFICE O/P EST MOD 30 MIN: CPT | Performed by: INTERNAL MEDICINE

## 2019-09-12 ASSESSMENT — MIFFLIN-ST. JEOR: SCORE: 1325.76

## 2019-09-12 NOTE — LETTER
"9/12/2019    Thania Cadena  [facility] [address]  [facility] [address]    RE: Sheila Johnson       Dear Colleague,    I had the pleasure of seeing Sheila Johnson in the [facility].    Electrophysiology/ Clinic Note         H&P and Plan:     REASON FOR VISIT: Electrophysiology evaluation.      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      Today she informs she is doing well.  She denies any symptoms such as chest pain, shortness of breath, lightheadedness, near-syncope or syncope.    Device was interrogated in June of 2019.  She is ventricular paced 94% of the time and atrially paced 7% of the time.  Lead problems are stable.    Echo done in 2018 showed normal LV function and mild aortic stenosis.      SSESSMENT AND PLAN:   1.  Device care.  Continue device checks every 3 months.  2.  Hypertension.  Blood pressures well controlled.    3.  Previous CVA/TIA.  Continue Plavix.    4.  Follow-up care.  Follow-up with an KENYON in an year and with me every other year.      Howie Beebe MD    Physical Exam:  Vitals: Ht 1.499 m (4' 11\")   Wt 102.5 kg (226 lb)   BMI 45.65 kg/m       Constitutional:  AAO x3.  Pt is in NAD.  HEAD: normocephalic.  SKIN: Skin normal color, texture and turgor with no lesions or eruptions.  Eyes: PERRL, EOMI.  ENT:  Supple, normal JVP. No lymphadenopathy or thyroid enlargement.  Chest:  CTAB.  Cardiac:  RRR, normal  S1 and S2.   Systolic murmur in LLSB II/VI.  Abdomen:  Normal BS.  Soft, non-tender and non-distended.  No rebound or guarding.    Extremities:  Pedious pulses palpable B/L.  LE edema noticed (1+).   Neurological: Strength and sensation grossly symmetric and intact throughout.       CURRENT MEDICATIONS:  Current Outpatient " Medications   Medication Sig Dispense Refill     acetaminophen (TYLENOL) 500 MG tablet Take 2 tablets (1,000 mg) by mouth 3 times daily 1 Bottle 0     ATORVASTATIN CALCIUM PO Take 40 mg by mouth daily.       CITALOPRAM HYDROBROMIDE PO Take 20 mg by mouth daily.       clopidogrel (PLAVIX) 75 MG tablet Take 75 mg by mouth daily       fluticasone (FLONASE) 50 MCG/ACT spray Spray 2 sprays into both nostrils daily        furosemide (LASIX) 20 MG tablet Take 20 mg by mouth daily       insulin detemir (LEVEMIR FLEXPEN/FLEXTOUCH) 100 UNIT/ML injection Inject 46 Units Subcutaneous every morning        Lidocaine (LIDOCARE) 4 % Patch Place 1 patch onto the skin every 24 hours 30 patch 0     meclizine (ANTIVERT) 25 MG tablet Take 1 tablet (25 mg) by mouth every 6 hours as needed for dizziness 30 tablet 1     menthol-zinc oxide (CALMOSEPTINE) 0.44-20.625 % OINT ointment Apply topically 2 times daily as needed for skin protection       Montelukast Sodium (SINGULAIR PO) Take 10 mg by mouth At Bedtime       order for DME [employer]  Phone #[phone]  Fax #[phone]  Ready To Wear Knee High Compression Stockings  20-30 mmHg   May need carlene and liner   Length of Need: Life Time  # of Pairs 3 3 Device 0     senna-docusate (SENOKOT-S;PERICOLACE) 8.6-50 MG per tablet Take 1 tablet by mouth 2 times daily 100 tablet 0       ALLERGIES   No Known Allergies    PAST MEDICAL HISTORY:  No past medical history on file.    PAST SURGICAL HISTORY:  No past surgical history on file.    FAMILY HISTORY:  No family history on file.    SOCIAL HISTORY:  Social History     Socioeconomic History     Marital status:      Spouse name: Not on file     Number of children: Not on file     Years of education: Not on file     Highest education level: Not on file   Occupational History     Not on file   Social Needs     Financial resource strain: Not on file     Food insecurity:     Worry: Not on file     Inability: Not on file      Transportation needs:     Medical: Not on file     Non-medical: Not on file   Tobacco Use     Smoking status: Not on file   Substance and Sexual Activity     Alcohol use: Not on file     Drug use: Not on file     Sexual activity: Not on file   Lifestyle     Physical activity:     Days per week: Not on file     Minutes per session: Not on file     Stress: Not on file   Relationships     Social connections:     Talks on phone: Not on file     Gets together: Not on file     Attends Holiness service: Not on file     Active member of club or organization: Not on file     Attends meetings of clubs or organizations: Not on file     Relationship status: Not on file     Intimate partner violence:     Fear of current or ex partner: Not on file     Emotionally abused: Not on file     Physically abused: Not on file     Forced sexual activity: Not on file   Other Topics Concern     Not on file   Social History Narrative     Not on file       Review of Systems:  Skin:        Eyes:       ENT:       Respiratory:       Cardiovascular:       Gastroenterology:      Genitourinary:       Musculoskeletal:       Neurologic:       Psychiatric:       Heme/Lymph/Imm:       Endocrine:           Recent Lab Results:  LIPID RESULTS:  Lab Results   Component Value Date    CHOL 144 04/26/2018    HDL 50 04/26/2018    LDL 71 04/26/2018    TRIG 114 04/26/2018    CHOLHDLRATIO 3.0 05/26/2009       LIVER ENZYME RESULTS:  Lab Results   Component Value Date    AST 13 04/26/2018    ALT 12 04/26/2018       CBC RESULTS:  Lab Results   Component Value Date    WBC 12.2 (H) 08/23/2018    RBC 4.26 08/23/2018    HGB 12.1 08/23/2018    HCT 37.9 08/23/2018    MCV 89 08/23/2018    MCH 28.4 08/23/2018    MCHC 31.9 08/23/2018    RDW 14.7 08/23/2018     08/23/2018       BMP RESULTS:  Lab Results   Component Value Date     08/23/2018    POTASSIUM 4.3 08/23/2018    CHLORIDE 105 08/23/2018    CO2 29 08/23/2018    ANIONGAP 6 08/23/2018     (H)  08/23/2018    BUN 23 08/23/2018    CR 1.13 (H) 08/23/2018    GFRESTIMATED 45 (L) 08/23/2018    GFRESTBLACK 54 (L) 08/23/2018    JAVON 8.7 08/23/2018        A1C RESULTS:  Lab Results   Component Value Date    A1C 10.1 (H) 05/23/2018       INR RESULTS:  Lab Results   Component Value Date    INR 1.07 05/06/2011    INR 1.43 (H) 06/29/2009         ECHOCARDIOGRAM  No results found for this or any previous visit (from the past 8760 hour(s)).      No orders of the defined types were placed in this encounter.    No orders of the defined types were placed in this encounter.    There are no discontinued medications.      Encounter Diagnosis   Name Primary?     AVB (atrioventricular block)          CC  Celia Otero MD  [address]  [place] [address]                Thank you for allowing me to participate in the care of your patient.      Sincerely,     Howie Beebe MD     [facility]    cc:   Celia Otero MD  [address]  [place] [address]

## 2019-09-12 NOTE — LETTER
"9/12/2019    Thania Cadena  [facility] [address]  [facility] [address]    RE: Sheila Johnson       Dear Colleague,    I had the pleasure of seeing Sheila Johnson in the [facility].    Electrophysiology/ Clinic Note         H&P and Plan:     REASON FOR VISIT: Electrophysiology evaluation.      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      Today she informs she is doing well.  She denies any symptoms such as chest pain, shortness of breath, lightheadedness, near-syncope or syncope.    Device was interrogated in June of 2019.  She is ventricular paced 94% of the time and atrially paced 7% of the time.  Lead problems are stable.    Echo done in 2018 showed normal LV function and mild aortic stenosis.      SSESSMENT AND PLAN:   1.  Device care.  Continue device checks every 3 months.  2.  Hypertension.  Blood pressures well controlled.    3.  Previous CVA/TIA.  Continue Plavix.    4.  Follow-up care.  Follow-up with an KENYON in an year and with me every other year.      Howie Beebe MD    Physical Exam:  Vitals: Ht 1.499 m (4' 11\")   Wt 102.5 kg (226 lb)   BMI 45.65 kg/m       Constitutional:  AAO x3.  Pt is in NAD.  HEAD: normocephalic.  SKIN: Skin normal color, texture and turgor with no lesions or eruptions.  Eyes: PERRL, EOMI.  ENT:  Supple, normal JVP. No lymphadenopathy or thyroid enlargement.  Chest:  CTAB.  Cardiac:  RRR, normal  S1 and S2.   Systolic murmur in LLSB II/VI.  Abdomen:  Normal BS.  Soft, non-tender and non-distended.  No rebound or guarding.    Extremities:  Pedious pulses palpable B/L.  LE edema noticed (1+).   Neurological: Strength and sensation grossly symmetric and intact throughout.       CURRENT MEDICATIONS:  Current Outpatient " Medications   Medication Sig Dispense Refill     acetaminophen (TYLENOL) 500 MG tablet Take 2 tablets (1,000 mg) by mouth 3 times daily 1 Bottle 0     ATORVASTATIN CALCIUM PO Take 40 mg by mouth daily.       CITALOPRAM HYDROBROMIDE PO Take 20 mg by mouth daily.       clopidogrel (PLAVIX) 75 MG tablet Take 75 mg by mouth daily       fluticasone (FLONASE) 50 MCG/ACT spray Spray 2 sprays into both nostrils daily        furosemide (LASIX) 20 MG tablet Take 20 mg by mouth daily       insulin detemir (LEVEMIR FLEXPEN/FLEXTOUCH) 100 UNIT/ML injection Inject 46 Units Subcutaneous every morning        Lidocaine (LIDOCARE) 4 % Patch Place 1 patch onto the skin every 24 hours 30 patch 0     meclizine (ANTIVERT) 25 MG tablet Take 1 tablet (25 mg) by mouth every 6 hours as needed for dizziness 30 tablet 1     menthol-zinc oxide (CALMOSEPTINE) 0.44-20.625 % OINT ointment Apply topically 2 times daily as needed for skin protection       Montelukast Sodium (SINGULAIR PO) Take 10 mg by mouth At Bedtime       order for DME [employer]  Phone #[phone]  Fax #[phone]  Ready To Wear Knee High Compression Stockings  20-30 mmHg   May need carlene and liner   Length of Need: Life Time  # of Pairs 3 3 Device 0     senna-docusate (SENOKOT-S;PERICOLACE) 8.6-50 MG per tablet Take 1 tablet by mouth 2 times daily 100 tablet 0       ALLERGIES   No Known Allergies    PAST MEDICAL HISTORY:  No past medical history on file.    PAST SURGICAL HISTORY:  No past surgical history on file.    FAMILY HISTORY:  No family history on file.    SOCIAL HISTORY:  Social History     Socioeconomic History     Marital status:      Spouse name: Not on file     Number of children: Not on file     Years of education: Not on file     Highest education level: Not on file   Occupational History     Not on file   Social Needs     Financial resource strain: Not on file     Food insecurity:     Worry: Not on file     Inability: Not on file      Transportation needs:     Medical: Not on file     Non-medical: Not on file   Tobacco Use     Smoking status: Not on file   Substance and Sexual Activity     Alcohol use: Not on file     Drug use: Not on file     Sexual activity: Not on file   Lifestyle     Physical activity:     Days per week: Not on file     Minutes per session: Not on file     Stress: Not on file   Relationships     Social connections:     Talks on phone: Not on file     Gets together: Not on file     Attends Roman Catholic service: Not on file     Active member of club or organization: Not on file     Attends meetings of clubs or organizations: Not on file     Relationship status: Not on file     Intimate partner violence:     Fear of current or ex partner: Not on file     Emotionally abused: Not on file     Physically abused: Not on file     Forced sexual activity: Not on file   Other Topics Concern     Not on file   Social History Narrative     Not on file       Review of Systems:  Skin:        Eyes:       ENT:       Respiratory:       Cardiovascular:       Gastroenterology:      Genitourinary:       Musculoskeletal:       Neurologic:       Psychiatric:       Heme/Lymph/Imm:       Endocrine:           Recent Lab Results:  LIPID RESULTS:  Lab Results   Component Value Date    CHOL 144 04/26/2018    HDL 50 04/26/2018    LDL 71 04/26/2018    TRIG 114 04/26/2018    CHOLHDLRATIO 3.0 05/26/2009       LIVER ENZYME RESULTS:  Lab Results   Component Value Date    AST 13 04/26/2018    ALT 12 04/26/2018       CBC RESULTS:  Lab Results   Component Value Date    WBC 12.2 (H) 08/23/2018    RBC 4.26 08/23/2018    HGB 12.1 08/23/2018    HCT 37.9 08/23/2018    MCV 89 08/23/2018    MCH 28.4 08/23/2018    MCHC 31.9 08/23/2018    RDW 14.7 08/23/2018     08/23/2018       BMP RESULTS:  Lab Results   Component Value Date     08/23/2018    POTASSIUM 4.3 08/23/2018    CHLORIDE 105 08/23/2018    CO2 29 08/23/2018    ANIONGAP 6 08/23/2018     (H)  08/23/2018    BUN 23 08/23/2018    CR 1.13 (H) 08/23/2018    GFRESTIMATED 45 (L) 08/23/2018    GFRESTBLACK 54 (L) 08/23/2018    JAVON 8.7 08/23/2018        A1C RESULTS:  Lab Results   Component Value Date    A1C 10.1 (H) 05/23/2018       INR RESULTS:  Lab Results   Component Value Date    INR 1.07 05/06/2011    INR 1.43 (H) 06/29/2009         ECHOCARDIOGRAM  No results found for this or any previous visit (from the past 8760 hour(s)).      No orders of the defined types were placed in this encounter.    No orders of the defined types were placed in this encounter.    There are no discontinued medications.      Encounter Diagnosis   Name Primary?     AVB (atrioventricular block)                Thank you for allowing me to participate in the care of your patient.    Sincerely,     Howie Beebe MD     [facility]

## 2019-09-12 NOTE — PROGRESS NOTES
"Electrophysiology/ Clinic Note         H&P and Plan:     REASON FOR VISIT: Electrophysiology evaluation.      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      Today she informs she is doing well.  She denies any symptoms such as chest pain, shortness of breath, lightheadedness, near-syncope or syncope.    Device was interrogated in June of 2019.  She is ventricular paced 94% of the time and atrially paced 7% of the time.  Lead problems are stable.    Echo done in 2018 showed normal LV function and mild aortic stenosis.      SSESSMENT AND PLAN:   1.  Device care.  Continue device checks every 3 months.  2.  Hypertension.  Blood pressures well controlled.    3.  Previous CVA/TIA.  Continue Plavix.    4.  Follow-up care.  Follow-up with an KENYON in an year and with me every other year.      Howie Beebe MD    Physical Exam:  Vitals: Ht 1.499 m (4' 11\")   Wt 102.5 kg (226 lb)   BMI 45.65 kg/m      Constitutional:  AAO x3.  Pt is in NAD.  HEAD: normocephalic.  SKIN: Skin normal color, texture and turgor with no lesions or eruptions.  Eyes: PERRL, EOMI.  ENT:  Supple, normal JVP. No lymphadenopathy or thyroid enlargement.  Chest:  CTAB.  Cardiac:  RRR, normal  S1 and S2.   Systolic murmur in LLSB II/VI.  Abdomen:  Normal BS.  Soft, non-tender and non-distended.  No rebound or guarding.    Extremities:  Pedious pulses palpable B/L.  LE edema noticed (1+).   Neurological: Strength and sensation grossly symmetric and intact throughout.       CURRENT MEDICATIONS:  Current Outpatient Medications   Medication Sig Dispense Refill     acetaminophen (TYLENOL) 500 MG tablet Take 2 tablets (1,000 mg) by mouth 3 times daily 1 Bottle 0     ATORVASTATIN CALCIUM PO Take 40 mg by mouth daily.       CITALOPRAM HYDROBROMIDE PO Take 20 mg by " mouth daily.       clopidogrel (PLAVIX) 75 MG tablet Take 75 mg by mouth daily       fluticasone (FLONASE) 50 MCG/ACT spray Spray 2 sprays into both nostrils daily        furosemide (LASIX) 20 MG tablet Take 20 mg by mouth daily       insulin detemir (LEVEMIR FLEXPEN/FLEXTOUCH) 100 UNIT/ML injection Inject 46 Units Subcutaneous every morning        Lidocaine (LIDOCARE) 4 % Patch Place 1 patch onto the skin every 24 hours 30 patch 0     meclizine (ANTIVERT) 25 MG tablet Take 1 tablet (25 mg) by mouth every 6 hours as needed for dizziness 30 tablet 1     menthol-zinc oxide (CALMOSEPTINE) 0.44-20.625 % OINT ointment Apply topically 2 times daily as needed for skin protection       Montelukast Sodium (SINGULAIR PO) Take 10 mg by mouth At Bedtime       order for DME [employer] Comp[employer] Stock[employer]  Phone #[phone]  Fax #[phone]  Ready To Wear Knee High Compression Stockings  20-30 mmHg   May need carlene and liner   Length of Need: Life Time  # of Pairs 3 3 Device 0     senna-docusate (SENOKOT-S;PERICOLACE) 8.6-50 MG per tablet Take 1 tablet by mouth 2 times daily 100 tablet 0       ALLERGIES   No Known Allergies    PAST MEDICAL HISTORY:  No past medical history on file.    PAST SURGICAL HISTORY:  No past surgical history on file.    FAMILY HISTORY:  No family history on file.    SOCIAL HISTORY:  Social History     Socioeconomic History     Marital status:      Spouse name: Not on file     Number of children: Not on file     Years of education: Not on file     Highest education level: Not on file   Occupational History     Not on file   Social Needs     Financial resource strain: Not on file     Food insecurity:     Worry: Not on file     Inability: Not on file     Transportation needs:     Medical: Not on file     Non-medical: Not on file   Tobacco Use     Smoking status: Not on file   Substance and Sexual Activity     Alcohol use: Not on file     Drug use: Not on file     Sexual activity: Not on file    Lifestyle     Physical activity:     Days per week: Not on file     Minutes per session: Not on file     Stress: Not on file   Relationships     Social connections:     Talks on phone: Not on file     Gets together: Not on file     Attends Faith service: Not on file     Active member of club or organization: Not on file     Attends meetings of clubs or organizations: Not on file     Relationship status: Not on file     Intimate partner violence:     Fear of current or ex partner: Not on file     Emotionally abused: Not on file     Physically abused: Not on file     Forced sexual activity: Not on file   Other Topics Concern     Not on file   Social History Narrative     Not on file       Review of Systems:  Skin:        Eyes:       ENT:       Respiratory:       Cardiovascular:       Gastroenterology:      Genitourinary:       Musculoskeletal:       Neurologic:       Psychiatric:       Heme/Lymph/Imm:       Endocrine:           Recent Lab Results:  LIPID RESULTS:  Lab Results   Component Value Date    CHOL 144 04/26/2018    HDL 50 04/26/2018    LDL 71 04/26/2018    TRIG 114 04/26/2018    CHOLHDLRATIO 3.0 05/26/2009       LIVER ENZYME RESULTS:  Lab Results   Component Value Date    AST 13 04/26/2018    ALT 12 04/26/2018       CBC RESULTS:  Lab Results   Component Value Date    WBC 12.2 (H) 08/23/2018    RBC 4.26 08/23/2018    HGB 12.1 08/23/2018    HCT 37.9 08/23/2018    MCV 89 08/23/2018    MCH 28.4 08/23/2018    MCHC 31.9 08/23/2018    RDW 14.7 08/23/2018     08/23/2018       BMP RESULTS:  Lab Results   Component Value Date     08/23/2018    POTASSIUM 4.3 08/23/2018    CHLORIDE 105 08/23/2018    CO2 29 08/23/2018    ANIONGAP 6 08/23/2018     (H) 08/23/2018    BUN 23 08/23/2018    CR 1.13 (H) 08/23/2018    GFRESTIMATED 45 (L) 08/23/2018    GFRESTBLACK 54 (L) 08/23/2018    JAVON 8.7 08/23/2018        A1C RESULTS:  Lab Results   Component Value Date    A1C 10.1 (H) 05/23/2018       INR  RESULTS:  Lab Results   Component Value Date    INR 1.07 05/06/2011    INR 1.43 (H) 06/29/2009         ECHOCARDIOGRAM  No results found for this or any previous visit (from the past 8760 hour(s)).      No orders of the defined types were placed in this encounter.    No orders of the defined types were placed in this encounter.    There are no discontinued medications.      Encounter Diagnosis   Name Primary?     AVB (atrioventricular block)          CC  Celia Otero MD  [address]  [place] [address]

## 2019-09-30 LAB
MDC_IDC_LEAD_IMPLANT_DT: NORMAL
MDC_IDC_LEAD_IMPLANT_DT: NORMAL
MDC_IDC_LEAD_LOCATION: NORMAL
MDC_IDC_LEAD_LOCATION: NORMAL
MDC_IDC_LEAD_LOCATION_DETAIL_1: NORMAL
MDC_IDC_LEAD_LOCATION_DETAIL_1: NORMAL
MDC_IDC_LEAD_MFG: NORMAL
MDC_IDC_LEAD_MFG: NORMAL
MDC_IDC_LEAD_MODEL: NORMAL
MDC_IDC_LEAD_MODEL: NORMAL
MDC_IDC_LEAD_POLARITY_TYPE: NORMAL
MDC_IDC_LEAD_POLARITY_TYPE: NORMAL
MDC_IDC_LEAD_SERIAL: NORMAL
MDC_IDC_LEAD_SERIAL: NORMAL
MDC_IDC_MSMT_BATTERY_STATUS: NORMAL
MDC_IDC_MSMT_LEADCHNL_RA_IMPEDANCE_VALUE: 578 OHM
MDC_IDC_MSMT_LEADCHNL_RA_LEAD_CHANNEL_STATUS: NORMAL
MDC_IDC_MSMT_LEADCHNL_RV_IMPEDANCE_VALUE: 596 OHM
MDC_IDC_MSMT_LEADCHNL_RV_LEAD_CHANNEL_STATUS: NORMAL
MDC_IDC_PG_IMPLANT_DTM: NORMAL
MDC_IDC_PG_MFG: NORMAL
MDC_IDC_PG_MODEL: NORMAL
MDC_IDC_PG_SERIAL: NORMAL
MDC_IDC_PG_TYPE: NORMAL
MDC_IDC_SESS_CLINIC_NAME: NORMAL
MDC_IDC_SESS_DTM: NORMAL
MDC_IDC_SESS_REPROGRAMMED: NO
MDC_IDC_SESS_TYPE: NORMAL
MDC_IDC_SET_BRADY_AT_MODE_SWITCH_MODE: NORMAL
MDC_IDC_SET_BRADY_AT_MODE_SWITCH_RATE: 160 {BEATS}/MIN
MDC_IDC_SET_BRADY_LOWRATE: 60 {BEATS}/MIN
MDC_IDC_SET_BRADY_MAX_SENSOR_RATE: 120 {BEATS}/MIN
MDC_IDC_SET_BRADY_MAX_TRACKING_RATE: 130 {BEATS}/MIN
MDC_IDC_SET_BRADY_MODE: NORMAL
MDC_IDC_SET_BRADY_PAV_DELAY_HIGH: 160 MS
MDC_IDC_SET_BRADY_PAV_DELAY_LOW: 200 MS
MDC_IDC_SET_BRADY_SAV_DELAY_HIGH: 130 MS
MDC_IDC_SET_BRADY_SAV_DELAY_LOW: 170 MS
MDC_IDC_SET_LEADCHNL_RA_PACING_AMPLITUDE: 2.6 V
MDC_IDC_SET_LEADCHNL_RA_PACING_POLARITY: NORMAL
MDC_IDC_SET_LEADCHNL_RA_PACING_PULSEWIDTH: 0.4 MS
MDC_IDC_SET_LEADCHNL_RA_SENSING_ADAPTATION_MODE: NORMAL
MDC_IDC_SET_LEADCHNL_RA_SENSING_POLARITY: NORMAL
MDC_IDC_SET_LEADCHNL_RV_PACING_AMPLITUDE: 2.4 V
MDC_IDC_SET_LEADCHNL_RV_PACING_POLARITY: NORMAL
MDC_IDC_SET_LEADCHNL_RV_PACING_PULSEWIDTH: 0.4 MS
MDC_IDC_SET_LEADCHNL_RV_SENSING_ADAPTATION_MODE: NORMAL
MDC_IDC_SET_LEADCHNL_RV_SENSING_POLARITY: NORMAL
MDC_IDC_STAT_AT_BURDEN_PERCENT: 0 %
MDC_IDC_STAT_AT_DTM_END: NORMAL
MDC_IDC_STAT_AT_DTM_START: NORMAL
MDC_IDC_STAT_AT_MODE_SW_COUNT_PER_DAY: 1
MDC_IDC_STAT_AT_MODE_SW_PERCENT_TIME_PER_DAY: 0 %
MDC_IDC_STAT_BRADY_AP_VP_PERCENT: 10 %
MDC_IDC_STAT_BRADY_AP_VS_PERCENT: 0 %
MDC_IDC_STAT_BRADY_AS_VP_PERCENT: 87 %
MDC_IDC_STAT_BRADY_AS_VS_PERCENT: 3 %
MDC_IDC_STAT_BRADY_DTM_END: NORMAL
MDC_IDC_STAT_BRADY_DTM_START: NORMAL
MDC_IDC_STAT_BRADY_RA_PERCENT_PACED: 10 %
MDC_IDC_STAT_BRADY_RV_PERCENT_PACED: 97 %
MDC_IDC_STAT_CRT_DTM_END: NORMAL
MDC_IDC_STAT_CRT_DTM_START: NORMAL

## 2019-10-07 ENCOUNTER — ANCILLARY PROCEDURE (OUTPATIENT)
Dept: CARDIOLOGY | Facility: CLINIC | Age: 84
End: 2019-10-07
Attending: INTERNAL MEDICINE
Payer: MEDICARE

## 2019-10-07 DIAGNOSIS — I44.30 AVB (ATRIOVENTRICULAR BLOCK): ICD-10-CM

## 2019-10-07 PROCEDURE — 93294 REM INTERROG EVL PM/LDLS PM: CPT | Performed by: INTERNAL MEDICINE

## 2019-10-07 PROCEDURE — 93296 REM INTERROG EVL PM/IDS: CPT | Performed by: INTERNAL MEDICINE

## 2019-11-04 LAB
MDC_IDC_LEAD_IMPLANT_DT: NORMAL
MDC_IDC_LEAD_IMPLANT_DT: NORMAL
MDC_IDC_LEAD_LOCATION: NORMAL
MDC_IDC_LEAD_LOCATION: NORMAL
MDC_IDC_LEAD_LOCATION_DETAIL_1: NORMAL
MDC_IDC_LEAD_LOCATION_DETAIL_1: NORMAL
MDC_IDC_LEAD_MFG: NORMAL
MDC_IDC_LEAD_MFG: NORMAL
MDC_IDC_LEAD_MODEL: NORMAL
MDC_IDC_LEAD_MODEL: NORMAL
MDC_IDC_LEAD_POLARITY_TYPE: NORMAL
MDC_IDC_LEAD_POLARITY_TYPE: NORMAL
MDC_IDC_LEAD_SERIAL: NORMAL
MDC_IDC_LEAD_SERIAL: NORMAL
MDC_IDC_PG_IMPLANT_DTM: NORMAL
MDC_IDC_PG_MFG: NORMAL
MDC_IDC_PG_MODEL: NORMAL
MDC_IDC_PG_SERIAL: NORMAL
MDC_IDC_PG_TYPE: NORMAL
MDC_IDC_SESS_CLINIC_NAME: NORMAL
MDC_IDC_SESS_DTM: NORMAL
MDC_IDC_SESS_TYPE: NORMAL

## 2020-01-01 ENCOUNTER — ANCILLARY PROCEDURE (OUTPATIENT)
Dept: CARDIOLOGY | Facility: CLINIC | Age: 85
End: 2020-01-01
Attending: INTERNAL MEDICINE
Payer: MEDICARE

## 2020-01-01 ENCOUNTER — TELEPHONE (OUTPATIENT)
Dept: CARDIOLOGY | Facility: CLINIC | Age: 85
End: 2020-01-01

## 2020-01-01 ENCOUNTER — OFFICE VISIT (OUTPATIENT)
Dept: CARDIOLOGY | Facility: CLINIC | Age: 85
End: 2020-01-01
Attending: NURSE PRACTITIONER
Payer: MEDICARE

## 2020-01-01 VITALS
HEIGHT: 59 IN | DIASTOLIC BLOOD PRESSURE: 75 MMHG | WEIGHT: 219 LBS | HEART RATE: 76 BPM | SYSTOLIC BLOOD PRESSURE: 124 MMHG | BODY MASS INDEX: 44.15 KG/M2 | OXYGEN SATURATION: 94 %

## 2020-01-01 DIAGNOSIS — Z95.0 CARDIAC PACEMAKER IN SITU: ICD-10-CM

## 2020-01-01 DIAGNOSIS — I44.2 ATRIOVENTRICULAR BLOCK, COMPLETE (H): Primary | ICD-10-CM

## 2020-01-01 DIAGNOSIS — I44.30 AVB (ATRIOVENTRICULAR BLOCK): Primary | ICD-10-CM

## 2020-01-01 DIAGNOSIS — I48.91 ATRIAL FIBRILLATION, UNSPECIFIED TYPE (H): ICD-10-CM

## 2020-01-01 LAB
MDC_IDC_LEAD_IMPLANT_DT: NORMAL
MDC_IDC_LEAD_IMPLANT_DT: NORMAL
MDC_IDC_LEAD_LOCATION: NORMAL
MDC_IDC_LEAD_LOCATION: NORMAL
MDC_IDC_LEAD_LOCATION_DETAIL_1: NORMAL
MDC_IDC_LEAD_LOCATION_DETAIL_1: NORMAL
MDC_IDC_LEAD_MFG: NORMAL
MDC_IDC_LEAD_MFG: NORMAL
MDC_IDC_LEAD_MODEL: NORMAL
MDC_IDC_LEAD_MODEL: NORMAL
MDC_IDC_LEAD_POLARITY_TYPE: NORMAL
MDC_IDC_LEAD_POLARITY_TYPE: NORMAL
MDC_IDC_LEAD_SERIAL: NORMAL
MDC_IDC_LEAD_SERIAL: NORMAL
MDC_IDC_MSMT_BATTERY_STATUS: NORMAL
MDC_IDC_MSMT_LEADCHNL_RA_IMPEDANCE_VALUE: 487 OHM
MDC_IDC_MSMT_LEADCHNL_RA_PACING_THRESHOLD_AMPLITUDE: 0.6 V
MDC_IDC_MSMT_LEADCHNL_RA_PACING_THRESHOLD_AMPLITUDE: 0.6 V
MDC_IDC_MSMT_LEADCHNL_RA_PACING_THRESHOLD_PULSEWIDTH: 0.4 MS
MDC_IDC_MSMT_LEADCHNL_RA_PACING_THRESHOLD_PULSEWIDTH: 0.4 MS
MDC_IDC_MSMT_LEADCHNL_RA_SENSING_INTR_AMPL: 6 MV
MDC_IDC_MSMT_LEADCHNL_RA_SENSING_INTR_AMPL: 6.3 MV
MDC_IDC_MSMT_LEADCHNL_RV_IMPEDANCE_VALUE: 604 OHM
MDC_IDC_MSMT_LEADCHNL_RV_PACING_THRESHOLD_AMPLITUDE: 1 V
MDC_IDC_MSMT_LEADCHNL_RV_PACING_THRESHOLD_AMPLITUDE: 1 V
MDC_IDC_MSMT_LEADCHNL_RV_PACING_THRESHOLD_PULSEWIDTH: 0.4 MS
MDC_IDC_MSMT_LEADCHNL_RV_PACING_THRESHOLD_PULSEWIDTH: 0.4 MS
MDC_IDC_MSMT_LEADCHNL_RV_SENSING_INTR_AMPL: 11.8 MV
MDC_IDC_MSMT_LEADCHNL_RV_SENSING_INTR_AMPL: 13.7 MV
MDC_IDC_PG_IMPLANT_DTM: NORMAL
MDC_IDC_PG_MFG: NORMAL
MDC_IDC_PG_MODEL: NORMAL
MDC_IDC_PG_SERIAL: NORMAL
MDC_IDC_PG_TYPE: NORMAL
MDC_IDC_SESS_CLINIC_NAME: NORMAL
MDC_IDC_SESS_DTM: NORMAL
MDC_IDC_SESS_REPROGRAMMED: NORMAL
MDC_IDC_SESS_TYPE: NORMAL
MDC_IDC_SET_BRADY_AT_MODE_SWITCH_MODE: NORMAL
MDC_IDC_SET_BRADY_AT_MODE_SWITCH_RATE: 160 {BEATS}/MIN
MDC_IDC_SET_BRADY_HYSTRATE: 60 {BEATS}/MIN
MDC_IDC_SET_BRADY_LOWRATE: 60 {BEATS}/MIN
MDC_IDC_SET_BRADY_MAX_SENSOR_RATE: 120 {BEATS}/MIN
MDC_IDC_SET_BRADY_MAX_TRACKING_RATE: 130 {BEATS}/MIN
MDC_IDC_SET_BRADY_MODE: NORMAL
MDC_IDC_SET_BRADY_NIGHT_RATE: 60 {BEATS}/MIN
MDC_IDC_SET_BRADY_PAV_DELAY_HIGH: 160 MS
MDC_IDC_SET_BRADY_PAV_DELAY_LOW: 200 MS
MDC_IDC_SET_BRADY_SAV_DELAY_HIGH: 130 MS
MDC_IDC_SET_BRADY_SAV_DELAY_LOW: 170 MS
MDC_IDC_SET_CRT_PACED_CHAMBERS: NORMAL
MDC_IDC_SET_LEADCHNL_LV_PACING_CATHODE_ELECTRODE_1: NORMAL
MDC_IDC_SET_LEADCHNL_LV_PACING_CATHODE_LOCATION_1: NORMAL
MDC_IDC_SET_LEADCHNL_LV_PACING_POLARITY: NORMAL
MDC_IDC_SET_LEADCHNL_LV_SENSING_CATHODE_ELECTRODE_1: NORMAL
MDC_IDC_SET_LEADCHNL_LV_SENSING_CATHODE_LOCATION_1: NORMAL
MDC_IDC_SET_LEADCHNL_LV_SENSING_POLARITY: NORMAL
MDC_IDC_SET_LEADCHNL_RA_PACING_AMPLITUDE: 2 V
MDC_IDC_SET_LEADCHNL_RA_PACING_POLARITY: NORMAL
MDC_IDC_SET_LEADCHNL_RA_PACING_PULSEWIDTH: 0.4 MS
MDC_IDC_SET_LEADCHNL_RA_SENSING_ADAPTATION_MODE: NORMAL
MDC_IDC_SET_LEADCHNL_RA_SENSING_POLARITY: NORMAL
MDC_IDC_SET_LEADCHNL_RA_SENSING_SENSITIVITY: NORMAL
MDC_IDC_SET_LEADCHNL_RV_PACING_AMPLITUDE: 2.4 V
MDC_IDC_SET_LEADCHNL_RV_PACING_POLARITY: NORMAL
MDC_IDC_SET_LEADCHNL_RV_PACING_PULSEWIDTH: 0.4 MS
MDC_IDC_SET_LEADCHNL_RV_SENSING_ADAPTATION_MODE: NORMAL
MDC_IDC_SET_LEADCHNL_RV_SENSING_POLARITY: NORMAL
MDC_IDC_SET_LEADCHNL_RV_SENSING_SENSITIVITY: NORMAL

## 2020-01-01 PROCEDURE — 93280 PM DEVICE PROGR EVAL DUAL: CPT | Performed by: INTERNAL MEDICINE

## 2020-01-01 PROCEDURE — 99214 OFFICE O/P EST MOD 30 MIN: CPT | Mod: 25 | Performed by: NURSE PRACTITIONER

## 2020-01-01 ASSESSMENT — MIFFLIN-ST. JEOR: SCORE: 1289.01

## 2020-01-06 ENCOUNTER — DOCUMENTATION ONLY (OUTPATIENT)
Dept: CARDIOLOGY | Facility: CLINIC | Age: 85
End: 2020-01-06

## 2020-01-06 NOTE — TELEPHONE ENCOUNTER
[employer] Ed[facility] (D) PPM ALERT    Remote alert for NSVT lasting 16 beats at a rate of 195 BPM. This occurred on Jan 5 at 1:50 PM. Echo showed normal EF. Pt is DNI/DNR. Will continue to monitor at this time. TONE Hernadez

## 2020-01-14 ENCOUNTER — ANCILLARY PROCEDURE (OUTPATIENT)
Dept: CARDIOLOGY | Facility: CLINIC | Age: 85
End: 2020-01-14
Attending: INTERNAL MEDICINE
Payer: MEDICARE

## 2020-01-14 DIAGNOSIS — Z95.0 CARDIAC PACEMAKER IN SITU: ICD-10-CM

## 2020-01-14 PROCEDURE — 93296 REM INTERROG EVL PM/IDS: CPT | Performed by: INTERNAL MEDICINE

## 2020-01-14 PROCEDURE — 93294 REM INTERROG EVL PM/LDLS PM: CPT | Performed by: INTERNAL MEDICINE

## 2020-01-16 LAB
MDC_IDC_LEAD_IMPLANT_DT: NORMAL
MDC_IDC_LEAD_IMPLANT_DT: NORMAL
MDC_IDC_LEAD_LOCATION: NORMAL
MDC_IDC_LEAD_LOCATION: NORMAL
MDC_IDC_LEAD_LOCATION_DETAIL_1: NORMAL
MDC_IDC_LEAD_LOCATION_DETAIL_1: NORMAL
MDC_IDC_LEAD_MFG: NORMAL
MDC_IDC_LEAD_MFG: NORMAL
MDC_IDC_LEAD_MODEL: NORMAL
MDC_IDC_LEAD_MODEL: NORMAL
MDC_IDC_LEAD_POLARITY_TYPE: NORMAL
MDC_IDC_LEAD_POLARITY_TYPE: NORMAL
MDC_IDC_LEAD_SERIAL: NORMAL
MDC_IDC_LEAD_SERIAL: NORMAL
MDC_IDC_MSMT_BATTERY_REMAINING_PERCENTAGE: 85 %
MDC_IDC_MSMT_BATTERY_STATUS: NORMAL
MDC_IDC_MSMT_LEADCHNL_RA_IMPEDANCE_VALUE: 537 OHM
MDC_IDC_MSMT_LEADCHNL_RA_LEAD_CHANNEL_STATUS: NORMAL
MDC_IDC_MSMT_LEADCHNL_RV_IMPEDANCE_VALUE: 590 OHM
MDC_IDC_MSMT_LEADCHNL_RV_LEAD_CHANNEL_STATUS: NORMAL
MDC_IDC_PG_IMPLANT_DTM: NORMAL
MDC_IDC_PG_MFG: NORMAL
MDC_IDC_PG_MODEL: NORMAL
MDC_IDC_PG_SERIAL: NORMAL
MDC_IDC_PG_TYPE: NORMAL
MDC_IDC_SESS_CLINIC_NAME: NORMAL
MDC_IDC_SESS_DTM: NORMAL
MDC_IDC_SESS_REPROGRAMMED: NO
MDC_IDC_SESS_TYPE: NORMAL
MDC_IDC_SET_BRADY_AT_MODE_SWITCH_MODE: NORMAL
MDC_IDC_SET_BRADY_AT_MODE_SWITCH_RATE: 160 {BEATS}/MIN
MDC_IDC_SET_BRADY_LOWRATE: 60 {BEATS}/MIN
MDC_IDC_SET_BRADY_MAX_SENSOR_RATE: 120 {BEATS}/MIN
MDC_IDC_SET_BRADY_MAX_TRACKING_RATE: 130 {BEATS}/MIN
MDC_IDC_SET_BRADY_MODE: NORMAL
MDC_IDC_SET_BRADY_PAV_DELAY_HIGH: 160 MS
MDC_IDC_SET_BRADY_PAV_DELAY_LOW: 200 MS
MDC_IDC_SET_BRADY_SAV_DELAY_HIGH: 130 MS
MDC_IDC_SET_BRADY_SAV_DELAY_LOW: 170 MS
MDC_IDC_SET_LEADCHNL_RA_PACING_AMPLITUDE: 2 V
MDC_IDC_SET_LEADCHNL_RA_PACING_POLARITY: NORMAL
MDC_IDC_SET_LEADCHNL_RA_PACING_PULSEWIDTH: 0.4 MS
MDC_IDC_SET_LEADCHNL_RA_SENSING_ADAPTATION_MODE: NORMAL
MDC_IDC_SET_LEADCHNL_RA_SENSING_POLARITY: NORMAL
MDC_IDC_SET_LEADCHNL_RV_PACING_AMPLITUDE: 2.4 V
MDC_IDC_SET_LEADCHNL_RV_PACING_POLARITY: NORMAL
MDC_IDC_SET_LEADCHNL_RV_PACING_PULSEWIDTH: 0.4 MS
MDC_IDC_SET_LEADCHNL_RV_SENSING_ADAPTATION_MODE: NORMAL
MDC_IDC_SET_LEADCHNL_RV_SENSING_POLARITY: NORMAL
MDC_IDC_STAT_AT_BURDEN_PERCENT: 0 %
MDC_IDC_STAT_AT_DTM_END: NORMAL
MDC_IDC_STAT_AT_DTM_START: NORMAL
MDC_IDC_STAT_AT_MODE_SW_COUNT_PER_DAY: 0
MDC_IDC_STAT_AT_MODE_SW_PERCENT_TIME_PER_DAY: 0 %
MDC_IDC_STAT_BRADY_AP_VP_PERCENT: 5 %
MDC_IDC_STAT_BRADY_AP_VS_PERCENT: 1 %
MDC_IDC_STAT_BRADY_AS_VP_PERCENT: 92 %
MDC_IDC_STAT_BRADY_AS_VS_PERCENT: 2 %
MDC_IDC_STAT_BRADY_DTM_END: NORMAL
MDC_IDC_STAT_BRADY_DTM_START: NORMAL
MDC_IDC_STAT_BRADY_RA_PERCENT_PACED: 6 %
MDC_IDC_STAT_BRADY_RV_PERCENT_PACED: 97 %
MDC_IDC_STAT_CRT_DTM_END: NORMAL
MDC_IDC_STAT_CRT_DTM_START: NORMAL

## 2020-04-14 ENCOUNTER — ANCILLARY PROCEDURE (OUTPATIENT)
Dept: CARDIOLOGY | Facility: CLINIC | Age: 85
End: 2020-04-14
Attending: INTERNAL MEDICINE
Payer: MEDICARE

## 2020-04-14 DIAGNOSIS — Z95.0 CARDIAC PACEMAKER IN SITU: ICD-10-CM

## 2020-04-14 LAB
MDC_IDC_LEAD_IMPLANT_DT: NORMAL
MDC_IDC_LEAD_IMPLANT_DT: NORMAL
MDC_IDC_LEAD_LOCATION: NORMAL
MDC_IDC_LEAD_LOCATION: NORMAL
MDC_IDC_LEAD_LOCATION_DETAIL_1: NORMAL
MDC_IDC_LEAD_LOCATION_DETAIL_1: NORMAL
MDC_IDC_LEAD_MFG: NORMAL
MDC_IDC_LEAD_MFG: NORMAL
MDC_IDC_LEAD_MODEL: NORMAL
MDC_IDC_LEAD_MODEL: NORMAL
MDC_IDC_LEAD_POLARITY_TYPE: NORMAL
MDC_IDC_LEAD_POLARITY_TYPE: NORMAL
MDC_IDC_LEAD_SERIAL: NORMAL
MDC_IDC_LEAD_SERIAL: NORMAL
MDC_IDC_MSMT_BATTERY_REMAINING_PERCENTAGE: 85 %
MDC_IDC_MSMT_BATTERY_STATUS: NORMAL
MDC_IDC_MSMT_LEADCHNL_RA_IMPEDANCE_VALUE: 527 OHM
MDC_IDC_MSMT_LEADCHNL_RA_LEAD_CHANNEL_STATUS: NORMAL
MDC_IDC_MSMT_LEADCHNL_RV_IMPEDANCE_VALUE: 592 OHM
MDC_IDC_MSMT_LEADCHNL_RV_LEAD_CHANNEL_STATUS: NORMAL
MDC_IDC_PG_IMPLANT_DTM: NORMAL
MDC_IDC_PG_MFG: NORMAL
MDC_IDC_PG_MODEL: NORMAL
MDC_IDC_PG_SERIAL: NORMAL
MDC_IDC_PG_TYPE: NORMAL
MDC_IDC_SESS_CLINIC_NAME: NORMAL
MDC_IDC_SESS_DTM: NORMAL
MDC_IDC_SESS_REPROGRAMMED: NO
MDC_IDC_SESS_TYPE: NORMAL
MDC_IDC_SET_BRADY_AT_MODE_SWITCH_MODE: NORMAL
MDC_IDC_SET_BRADY_AT_MODE_SWITCH_RATE: 160 {BEATS}/MIN
MDC_IDC_SET_BRADY_LOWRATE: 60 {BEATS}/MIN
MDC_IDC_SET_BRADY_MAX_SENSOR_RATE: 120 {BEATS}/MIN
MDC_IDC_SET_BRADY_MAX_TRACKING_RATE: 130 {BEATS}/MIN
MDC_IDC_SET_BRADY_MODE: NORMAL
MDC_IDC_SET_BRADY_PAV_DELAY_HIGH: 160 MS
MDC_IDC_SET_BRADY_PAV_DELAY_LOW: 200 MS
MDC_IDC_SET_BRADY_SAV_DELAY_HIGH: 130 MS
MDC_IDC_SET_BRADY_SAV_DELAY_LOW: 170 MS
MDC_IDC_SET_LEADCHNL_RA_PACING_AMPLITUDE: 2 V
MDC_IDC_SET_LEADCHNL_RA_PACING_POLARITY: NORMAL
MDC_IDC_SET_LEADCHNL_RA_PACING_PULSEWIDTH: 0.4 MS
MDC_IDC_SET_LEADCHNL_RA_SENSING_ADAPTATION_MODE: NORMAL
MDC_IDC_SET_LEADCHNL_RA_SENSING_POLARITY: NORMAL
MDC_IDC_SET_LEADCHNL_RV_PACING_AMPLITUDE: 2.4 V
MDC_IDC_SET_LEADCHNL_RV_PACING_POLARITY: NORMAL
MDC_IDC_SET_LEADCHNL_RV_PACING_PULSEWIDTH: 0.4 MS
MDC_IDC_SET_LEADCHNL_RV_SENSING_ADAPTATION_MODE: NORMAL
MDC_IDC_SET_LEADCHNL_RV_SENSING_POLARITY: NORMAL
MDC_IDC_STAT_AT_BURDEN_PERCENT: 0 %
MDC_IDC_STAT_AT_DTM_END: NORMAL
MDC_IDC_STAT_AT_DTM_START: NORMAL
MDC_IDC_STAT_AT_MODE_SW_COUNT_PER_DAY: 0
MDC_IDC_STAT_AT_MODE_SW_PERCENT_TIME_PER_DAY: 0 %
MDC_IDC_STAT_BRADY_AP_VP_PERCENT: 4 %
MDC_IDC_STAT_BRADY_AP_VS_PERCENT: 0 %
MDC_IDC_STAT_BRADY_AS_VP_PERCENT: 96 %
MDC_IDC_STAT_BRADY_AS_VS_PERCENT: 0 %
MDC_IDC_STAT_BRADY_DTM_END: NORMAL
MDC_IDC_STAT_BRADY_DTM_START: NORMAL
MDC_IDC_STAT_BRADY_RA_PERCENT_PACED: 4 %
MDC_IDC_STAT_BRADY_RV_PERCENT_PACED: 100 %
MDC_IDC_STAT_CRT_DTM_END: NORMAL
MDC_IDC_STAT_CRT_DTM_START: NORMAL

## 2020-04-14 PROCEDURE — 93296 REM INTERROG EVL PM/IDS: CPT | Performed by: INTERNAL MEDICINE

## 2020-04-14 PROCEDURE — 93294 REM INTERROG EVL PM/LDLS PM: CPT | Performed by: INTERNAL MEDICINE

## 2020-04-17 DIAGNOSIS — I48.91 ATRIAL FIBRILLATION, UNSPECIFIED TYPE (H): Primary | ICD-10-CM

## 2020-07-13 ENCOUNTER — TELEPHONE (OUTPATIENT)
Dept: CARDIOLOGY | Facility: CLINIC | Age: 85
End: 2020-07-13

## 2020-07-13 ENCOUNTER — ANCILLARY PROCEDURE (OUTPATIENT)
Dept: CARDIOLOGY | Facility: CLINIC | Age: 85
End: 2020-07-13
Attending: INTERNAL MEDICINE
Payer: MEDICARE

## 2020-07-13 DIAGNOSIS — I44.2 ATRIOVENTRICULAR BLOCK, COMPLETE (H): Primary | ICD-10-CM

## 2020-07-13 DIAGNOSIS — I44.2 ATRIOVENTRICULAR BLOCK, COMPLETE (H): ICD-10-CM

## 2020-07-13 DIAGNOSIS — Z95.0 CARDIAC PACEMAKER IN SITU: ICD-10-CM

## 2020-07-13 PROCEDURE — 93294 REM INTERROG EVL PM/LDLS PM: CPT | Performed by: INTERNAL MEDICINE

## 2020-07-13 PROCEDURE — 93296 REM INTERROG EVL PM/IDS: CPT | Performed by: INTERNAL MEDICINE

## 2020-07-14 ENCOUNTER — ANCILLARY PROCEDURE (OUTPATIENT)
Dept: CARDIOLOGY | Facility: CLINIC | Age: 85
End: 2020-07-14
Attending: INTERNAL MEDICINE
Payer: MEDICARE

## 2020-07-14 DIAGNOSIS — I44.2 ATRIOVENTRICULAR BLOCK, COMPLETE (H): ICD-10-CM

## 2020-07-14 DIAGNOSIS — Z95.0 CARDIAC PACEMAKER IN SITU: ICD-10-CM

## 2020-07-14 PROCEDURE — 93296 REM INTERROG EVL PM/IDS: CPT | Performed by: INTERNAL MEDICINE

## 2020-07-14 PROCEDURE — 93294 REM INTERROG EVL PM/LDLS PM: CPT | Performed by: INTERNAL MEDICINE

## 2020-07-30 LAB
MDC_IDC_LEAD_IMPLANT_DT: NORMAL
MDC_IDC_LEAD_LOCATION: NORMAL
MDC_IDC_LEAD_LOCATION_DETAIL_1: NORMAL
MDC_IDC_LEAD_MFG: NORMAL
MDC_IDC_LEAD_MODEL: NORMAL
MDC_IDC_LEAD_POLARITY_TYPE: NORMAL
MDC_IDC_LEAD_SERIAL: NORMAL
MDC_IDC_MSMT_BATTERY_REMAINING_PERCENTAGE: 80 %
MDC_IDC_MSMT_BATTERY_REMAINING_PERCENTAGE: 80 %
MDC_IDC_MSMT_BATTERY_STATUS: NORMAL
MDC_IDC_MSMT_BATTERY_STATUS: NORMAL
MDC_IDC_MSMT_LEADCHNL_RA_IMPEDANCE_VALUE: 488 OHM
MDC_IDC_MSMT_LEADCHNL_RA_IMPEDANCE_VALUE: 488 OHM
MDC_IDC_MSMT_LEADCHNL_RA_IMPEDANCE_VALUE: 522 OHM
MDC_IDC_MSMT_LEADCHNL_RA_IMPEDANCE_VALUE: 522 OHM
MDC_IDC_MSMT_LEADCHNL_RA_LEAD_CHANNEL_STATUS: NORMAL
MDC_IDC_MSMT_LEADCHNL_RA_LEAD_CHANNEL_STATUS: NORMAL
MDC_IDC_MSMT_LEADCHNL_RV_IMPEDANCE_VALUE: 585 OHM
MDC_IDC_MSMT_LEADCHNL_RV_IMPEDANCE_VALUE: 585 OHM
MDC_IDC_MSMT_LEADCHNL_RV_IMPEDANCE_VALUE: 594 OHM
MDC_IDC_MSMT_LEADCHNL_RV_IMPEDANCE_VALUE: 594 OHM
MDC_IDC_MSMT_LEADCHNL_RV_LEAD_CHANNEL_STATUS: NORMAL
MDC_IDC_MSMT_LEADCHNL_RV_LEAD_CHANNEL_STATUS: NORMAL
MDC_IDC_PG_IMPLANT_DTM: NORMAL
MDC_IDC_PG_IMPLANT_DTM: NORMAL
MDC_IDC_PG_MFG: NORMAL
MDC_IDC_PG_MFG: NORMAL
MDC_IDC_PG_MODEL: NORMAL
MDC_IDC_PG_MODEL: NORMAL
MDC_IDC_PG_SERIAL: NORMAL
MDC_IDC_PG_SERIAL: NORMAL
MDC_IDC_PG_TYPE: NORMAL
MDC_IDC_PG_TYPE: NORMAL
MDC_IDC_SESS_CLINIC_NAME: NORMAL
MDC_IDC_SESS_CLINIC_NAME: NORMAL
MDC_IDC_SESS_DTM: NORMAL
MDC_IDC_SESS_DTM: NORMAL
MDC_IDC_SESS_REPROGRAMMED: NO
MDC_IDC_SESS_REPROGRAMMED: NO
MDC_IDC_SESS_TYPE: NORMAL
MDC_IDC_SESS_TYPE: NORMAL
MDC_IDC_SET_BRADY_AT_MODE_SWITCH_MODE: NORMAL
MDC_IDC_SET_BRADY_AT_MODE_SWITCH_MODE: NORMAL
MDC_IDC_SET_BRADY_AT_MODE_SWITCH_RATE: 160 {BEATS}/MIN
MDC_IDC_SET_BRADY_AT_MODE_SWITCH_RATE: 160 {BEATS}/MIN
MDC_IDC_SET_BRADY_LOWRATE: 60 {BEATS}/MIN
MDC_IDC_SET_BRADY_LOWRATE: 60 {BEATS}/MIN
MDC_IDC_SET_BRADY_MAX_SENSOR_RATE: 120 {BEATS}/MIN
MDC_IDC_SET_BRADY_MAX_SENSOR_RATE: 120 {BEATS}/MIN
MDC_IDC_SET_BRADY_MAX_TRACKING_RATE: 130 {BEATS}/MIN
MDC_IDC_SET_BRADY_MAX_TRACKING_RATE: 130 {BEATS}/MIN
MDC_IDC_SET_BRADY_MODE: NORMAL
MDC_IDC_SET_BRADY_MODE: NORMAL
MDC_IDC_SET_BRADY_PAV_DELAY_HIGH: 160 MS
MDC_IDC_SET_BRADY_PAV_DELAY_HIGH: 160 MS
MDC_IDC_SET_BRADY_PAV_DELAY_LOW: 200 MS
MDC_IDC_SET_BRADY_PAV_DELAY_LOW: 200 MS
MDC_IDC_SET_BRADY_SAV_DELAY_HIGH: 130 MS
MDC_IDC_SET_BRADY_SAV_DELAY_HIGH: 130 MS
MDC_IDC_SET_BRADY_SAV_DELAY_LOW: 170 MS
MDC_IDC_SET_BRADY_SAV_DELAY_LOW: 170 MS
MDC_IDC_SET_LEADCHNL_RA_PACING_AMPLITUDE: 2 V
MDC_IDC_SET_LEADCHNL_RA_PACING_AMPLITUDE: 2 V
MDC_IDC_SET_LEADCHNL_RA_PACING_POLARITY: NORMAL
MDC_IDC_SET_LEADCHNL_RA_PACING_POLARITY: NORMAL
MDC_IDC_SET_LEADCHNL_RA_PACING_PULSEWIDTH: 0.4 MS
MDC_IDC_SET_LEADCHNL_RA_PACING_PULSEWIDTH: 0.4 MS
MDC_IDC_SET_LEADCHNL_RA_SENSING_ADAPTATION_MODE: NORMAL
MDC_IDC_SET_LEADCHNL_RA_SENSING_ADAPTATION_MODE: NORMAL
MDC_IDC_SET_LEADCHNL_RA_SENSING_POLARITY: NORMAL
MDC_IDC_SET_LEADCHNL_RA_SENSING_POLARITY: NORMAL
MDC_IDC_SET_LEADCHNL_RV_PACING_AMPLITUDE: 2.4 V
MDC_IDC_SET_LEADCHNL_RV_PACING_AMPLITUDE: 2.4 V
MDC_IDC_SET_LEADCHNL_RV_PACING_POLARITY: NORMAL
MDC_IDC_SET_LEADCHNL_RV_PACING_POLARITY: NORMAL
MDC_IDC_SET_LEADCHNL_RV_PACING_PULSEWIDTH: 0.4 MS
MDC_IDC_SET_LEADCHNL_RV_PACING_PULSEWIDTH: 0.4 MS
MDC_IDC_SET_LEADCHNL_RV_SENSING_ADAPTATION_MODE: NORMAL
MDC_IDC_SET_LEADCHNL_RV_SENSING_ADAPTATION_MODE: NORMAL
MDC_IDC_SET_LEADCHNL_RV_SENSING_POLARITY: NORMAL
MDC_IDC_SET_LEADCHNL_RV_SENSING_POLARITY: NORMAL
MDC_IDC_STAT_AT_BURDEN_PERCENT: 0 %
MDC_IDC_STAT_AT_BURDEN_PERCENT: 0 %
MDC_IDC_STAT_AT_DTM_END: NORMAL
MDC_IDC_STAT_AT_DTM_END: NORMAL
MDC_IDC_STAT_AT_DTM_START: NORMAL
MDC_IDC_STAT_AT_DTM_START: NORMAL
MDC_IDC_STAT_AT_MODE_SW_COUNT_PER_DAY: 0
MDC_IDC_STAT_AT_MODE_SW_COUNT_PER_DAY: 0
MDC_IDC_STAT_AT_MODE_SW_PERCENT_TIME_PER_DAY: 0 %
MDC_IDC_STAT_AT_MODE_SW_PERCENT_TIME_PER_DAY: 0 %
MDC_IDC_STAT_BRADY_DTM_END: NORMAL
MDC_IDC_STAT_BRADY_DTM_END: NORMAL
MDC_IDC_STAT_BRADY_DTM_START: NORMAL
MDC_IDC_STAT_BRADY_DTM_START: NORMAL
MDC_IDC_STAT_BRADY_RA_PERCENT_PACED: 6 %
MDC_IDC_STAT_BRADY_RA_PERCENT_PACED: 6 %
MDC_IDC_STAT_BRADY_RV_PERCENT_PACED: 98 %
MDC_IDC_STAT_BRADY_RV_PERCENT_PACED: 98 %
MDC_IDC_STAT_CRT_DTM_END: NORMAL
MDC_IDC_STAT_CRT_DTM_END: NORMAL
MDC_IDC_STAT_CRT_DTM_START: NORMAL
MDC_IDC_STAT_CRT_DTM_START: NORMAL

## 2020-11-17 NOTE — LETTER
11/17/2020    Thania Cadena  [facility] [address]  [facility] [address]    RE: Sheial C Alex       Dear Colleague,    I had the pleasure of seeing Sheila Johnson in the [facility].    HPI:  Sheila Johnson is a 89 year old female who presents for annual cardiology visit.  He is a patient of Dr. Beebe.  His medical history includes     1. 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 Hypertension  3. DM  4. Depression  5. CVA    Diagnostics:  Device check (11/2020) revealed A-paced 8%  : 98 %    Stable leads.  Battery life 8 years.   Atrial Arrhythmia: none   Ventricular Arrhythmia none        Today she reports chronic edema in right foot/lower leg.   She is in a wheelchair but at home she uses a walker and in the hallways she uses scooter.   She made these changes due to falls and her last fall was over 1 year ago.   When she walks she denies chest pain or pressure, dizziness angina, dyspnea at rest or with exertion, orthopnea, PND.  States she takes medications as prescribed.         ASSESSMENT AND PLAN    Second degree AV block    S/p Biotronik dual chamber permanent pacemaker (4/2018)    Normal device check    Stable leads    Follow with the device clinic on a quarterly basis.     CVA     Currently taking Plavix and atorvastatin      Follow up in 1 year with Bethany Jhaveri and device check prior         Patient expresses understanding and agreement with the plan.     I appreciate the chance to help with Sheila Johnson Please let me know if you have any questions or concerns.    Bethany Jhaveri, APRN, CNP    This note was completed in part using Dragon voice recognition software. Although reviewed after completion, some word and grammatical errors may occur.    Orders Placed This Encounter   Procedures     Follow-Up with Cardiac Advanced Practice Provider     No orders of the defined types were placed in this  encounter.    Medications Discontinued During This Encounter   Medication Reason     meclizine (ANTIVERT) 25 MG tablet Medication Reconciliation Clean Up     Lidocaine (LIDOCARE) 4 % Patch Medication Reconciliation Clean Up         Encounter Diagnoses   Name Primary?     Atrial fibrillation, unspecified type (H)      Atrioventricular block, complete (H) Yes     Cardiac pacemaker in situ        CURRENT MEDICATIONS:  Current Outpatient Medications   Medication Sig Dispense Refill     acetaminophen (TYLENOL) 500 MG tablet Take 2 tablets (1,000 mg) by mouth 3 times daily 1 Bottle 0     ATORVASTATIN CALCIUM PO Take 40 mg by mouth daily.       CITALOPRAM HYDROBROMIDE PO Take 20 mg by mouth daily.       clopidogrel (PLAVIX) 75 MG tablet Take 75 mg by mouth daily       fluticasone (FLONASE) 50 MCG/ACT spray Spray 2 sprays into both nostrils daily        furosemide (LASIX) 20 MG tablet Take 20 mg by mouth daily       insulin detemir (LEVEMIR FLEXPEN/FLEXTOUCH) 100 UNIT/ML injection Inject 35 Units Subcutaneous every morning        menthol-zinc oxide (CALMOSEPTINE) 0.44-20.625 % OINT ointment Apply topically 2 times daily as needed for skin protection       Montelukast Sodium (SINGULAIR PO) Take 10 mg by mouth At Bedtime       order for DME [employer]  Phone #[phone]  Fax #[phone]  Ready To Wear Knee High Compression Stockings  20-30 mmHg   May need carlene and liner   Length of Need: Life Time  # of Pairs 3 (Patient not taking: Reported on 11/17/2020) 3 Device 0     senna-docusate (SENOKOT-S;PERICOLACE) 8.6-50 MG per tablet Take 1 tablet by mouth 2 times daily 100 tablet 0       ALLERGIES   No Known Allergies    PAST MEDICAL HISTORY:  Past Medical History:   Diagnosis Date     Arthritis      AVB (atrioventricular block)      CVA (cerebral vascular accident) (H)      Depressive disorder      Diabetes mellitus (H)      Hypercholesteraemia      Hypertension      LAFB (left anterior fascicular block)   "    LBBB (left bundle branch block)        PAST SURGICAL HISTORY:  Past Surgical History:   Procedure Laterality Date     ORTHOPEDIC SURGERY       RELEASE CARPAL TUNNEL         FAMILY HISTORY:  No family history on file.    SOCIAL HISTORY:  Social History     Socioeconomic History     Marital status:      Spouse name: None     Number of children: None     Years of education: None     Highest education level: None   Occupational History     None   Social Needs     Financial resource strain: None     Food insecurity     Worry: None     Inability: None     Transportation needs     Medical: None     Non-medical: None   Tobacco Use     Smoking status: Former Smoker     Smokeless tobacco: Never Used   Substance and Sexual Activity     Alcohol use: No     Drug use: No     Sexual activity: None   Lifestyle     Physical activity     Days per week: None     Minutes per session: None     Stress: None   Relationships     Social connections     Talks on phone: None     Gets together: None     Attends Congregational service: None     Active member of club or organization: None     Attends meetings of clubs or organizations: None     Relationship status: None     Intimate partner violence     Fear of current or ex partner: None     Emotionally abused: None     Physically abused: None     Forced sexual activity: None   Other Topics Concern     None   Social History Narrative     None       Review of Systems:  Skin:  Negative     Eyes:  Positive for glasses  ENT:  Negative    Respiratory:  Positive for dyspnea on exertion  Cardiovascular:    Positive for;edema  Gastroenterology: Negative    Genitourinary:  not assessed    Musculoskeletal:  Positive for arthritis;joint stiffness  Neurologic:  Negative    Psychiatric:  Negative    Heme/Lymph/Imm:  Negative    Endocrine:  Positive for diabetes    Physical Exam:  Vitals: /75   Pulse 76   Ht 1.499 m (4' 11\")   Wt 99.3 kg (219 lb)   SpO2 94%   BMI 44.23 kg/m  "     Constitutional:  cooperative, alert and oriented, well developed, well nourished, in no acute distress obese in wheel chair    Skin:           Head:  normocephalic        Eyes:  pupils equal and round        ENT:  no pallor or cyanosis        Neck:  JVP normal        Chest:  clear to auscultation        Cardiac: regular rhythm             murmur    Abdomen:  abdomen soft obese      Vascular:       right radial artery;2+             left radial artery;2+                  Extremities and Back:           Neurological:  no gross motor deficits          Recent Lab Results:  Labs at [facility]     CC  BELLO Huber CNP  [address]  [place] [address]                    Thank you for allowing me to participate in the care of your patient.      Sincerely,     BELLO Palomo CNP     [facility]    cc:   BELLO Huber CNP  [address]  [place] [address]

## 2020-11-17 NOTE — PROGRESS NOTES
HPI:  Sheila Johnson is a 89 year old female who presents for annual cardiology visit.  He is a patient of Dr. Beebe.  His medical history includes     1. 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 Hypertension  3. DM  4. Depression  5. CVA    Diagnostics:  Device check (11/2020) revealed A-paced 8%  : 98 %    Stable leads.  Battery life 8 years.   Atrial Arrhythmia: none   Ventricular Arrhythmia none        Today she reports chronic edema in right foot/lower leg.   She is in a wheelchair but at home she uses a walker and in the hallways she uses scooter.   She made these changes due to falls and her last fall was over 1 year ago.   When she walks she denies chest pain or pressure, dizziness angina, dyspnea at rest or with exertion, orthopnea, PND.  States she takes medications as prescribed.         ASSESSMENT AND PLAN    Second degree AV block    S/p Biotronik dual chamber permanent pacemaker (4/2018)    Normal device check    Stable leads    Follow with the device clinic on a quarterly basis.     CVA     Currently taking Plavix and atorvastatin      Follow up in 1 year with Bethany Jhaveri and device check prior         Patient expresses understanding and agreement with the plan.     I appreciate the chance to help with Sheila Johnson Please let me know if you have any questions or concerns.    BELLO Victoria, CNP    This note was completed in part using Dragon voice recognition software. Although reviewed after completion, some word and grammatical errors may occur.    Orders Placed This Encounter   Procedures     Follow-Up with Cardiac Advanced Practice Provider     No orders of the defined types were placed in this encounter.    Medications Discontinued During This Encounter   Medication Reason     meclizine (ANTIVERT) 25 MG tablet Medication Reconciliation Clean Up     Lidocaine (LIDOCARE) 4 % Patch Medication Reconciliation Clean Up         Encounter Diagnoses   Name Primary?      Atrial fibrillation, unspecified type (H)      Atrioventricular block, complete (H) Yes     Cardiac pacemaker in situ        CURRENT MEDICATIONS:  Current Outpatient Medications   Medication Sig Dispense Refill     acetaminophen (TYLENOL) 500 MG tablet Take 2 tablets (1,000 mg) by mouth 3 times daily 1 Bottle 0     ATORVASTATIN CALCIUM PO Take 40 mg by mouth daily.       CITALOPRAM HYDROBROMIDE PO Take 20 mg by mouth daily.       clopidogrel (PLAVIX) 75 MG tablet Take 75 mg by mouth daily       fluticasone (FLONASE) 50 MCG/ACT spray Spray 2 sprays into both nostrils daily        furosemide (LASIX) 20 MG tablet Take 20 mg by mouth daily       insulin detemir (LEVEMIR FLEXPEN/FLEXTOUCH) 100 UNIT/ML injection Inject 35 Units Subcutaneous every morning        menthol-zinc oxide (CALMOSEPTINE) 0.44-20.625 % OINT ointment Apply topically 2 times daily as needed for skin protection       Montelukast Sodium (SINGULAIR PO) Take 10 mg by mouth At Bedtime       order for DME [employer]  Phone #[phone]  Fax #[phone]  Ready To Wear Knee High Compression Stockings  20-30 mmHg   May need carlene and liner   Length of Need: Life Time  # of Pairs 3 (Patient not taking: Reported on 11/17/2020) 3 Device 0     senna-docusate (SENOKOT-S;PERICOLACE) 8.6-50 MG per tablet Take 1 tablet by mouth 2 times daily 100 tablet 0       ALLERGIES   No Known Allergies    PAST MEDICAL HISTORY:  Past Medical History:   Diagnosis Date     Arthritis      AVB (atrioventricular block)      CVA (cerebral vascular accident) (H)      Depressive disorder      Diabetes mellitus (H)      Hypercholesteraemia      Hypertension      LAFB (left anterior fascicular block)      LBBB (left bundle branch block)        PAST SURGICAL HISTORY:  Past Surgical History:   Procedure Laterality Date     ORTHOPEDIC SURGERY       RELEASE CARPAL TUNNEL         FAMILY HISTORY:  No family history on file.    SOCIAL HISTORY:  Social History  "    Socioeconomic History     Marital status:      Spouse name: None     Number of children: None     Years of education: None     Highest education level: None   Occupational History     None   Social Needs     Financial resource strain: None     Food insecurity     Worry: None     Inability: None     Transportation needs     Medical: None     Non-medical: None   Tobacco Use     Smoking status: Former Smoker     Smokeless tobacco: Never Used   Substance and Sexual Activity     Alcohol use: No     Drug use: No     Sexual activity: None   Lifestyle     Physical activity     Days per week: None     Minutes per session: None     Stress: None   Relationships     Social connections     Talks on phone: None     Gets together: None     Attends [employer] service: None     Active member of club or organization: None     Attends meetings of clubs or organizations: None     Relationship status: None     Intimate partner violence     Fear of current or ex partner: None     Emotionally abused: None     Physically abused: None     Forced sexual activity: None   Other Topics Concern     None   Social History Narrative     None       Review of Systems:  Skin:  Negative     Eyes:  Positive for glasses  ENT:  Negative    Respiratory:  Positive for dyspnea on exertion  Cardiovascular:    Positive for;edema  Gastroenterology: Negative    Genitourinary:  not assessed    Musculoskeletal:  Positive for arthritis;joint stiffness  Neurologic:  Negative    Psychiatric:  Negative    Heme/Lymph/Imm:  Negative    Endocrine:  Positive for diabetes    Physical Exam:  Vitals: /75   Pulse 76   Ht 1.499 m (4' 11\")   Wt 99.3 kg (219 lb)   SpO2 94%   BMI 44.23 kg/m      Constitutional:  cooperative, alert and oriented, well developed, well nourished, in no acute distress obese in wheel chair    Skin:           Head:  normocephalic        Eyes:  pupils equal and round        ENT:  no pallor or cyanosis        Neck:  JVP normal    "     Chest:  clear to auscultation        Cardiac: regular rhythm             murmur    Abdomen:  abdomen soft obese      Vascular:       right radial artery;2+             left radial artery;2+                  Extremities and Back:           Neurological:  no gross motor deficits          Recent Lab Results:  Labs at [facility]  Bethany Jhaveri, APRN CNP  [address]  [place] [address]

## 2020-11-17 NOTE — TELEPHONE ENCOUNTER
Wellness Screening Tool    Symptom Screening:    Do you have one of the following NEW symptoms:      Fever (subjective or >100.0)?  No    New cough? No    Shortness of breath? No    Chills? No    New loss of taste or smell? No    Generalized body aches? No    New persistent headache? No    New sore throat? No    Nausea, vomiting or diarrhea? No    Within the past 2 weeks, have you been exposed to someone with a known positive illness below?      COVID - 19 (known or suspected) No    Chicken pox?  No    Measles? No    Pertussis? No    Have you had a positive COVID-19 diagnostic test (nasal swab test) in the last 14 days or are you currently   on self-quarantine restrictions (i.e.travel restriction, exposure, etc?) No        Patient notified of visitor restriction: Yes  Patient informed to wear a mask: Yes    Patient's appointment status: Patient will be seen in clinic as scheduled on 11/17/2020

## 2020-11-17 NOTE — LETTER
11/17/2020    Thania Cadena  [facility], [place] [address]  [facility] [address]    RE: Sheila C Alex       Dear Colleague,    I had the pleasure of seeing Sheila Johnson in the [facility].    HPI:  Sheila Johnson is a 89 year old female who presents for annual cardiology visit.  He is a patient of Dr. Beebe.  His medical history includes     1. 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 Hypertension  3. DM  4. Depression  5. CVA    Diagnostics:  Device check (11/2020) revealed A-paced 8%  : 98 %    Stable leads.  Battery life 8 years.   Atrial Arrhythmia: none   Ventricular Arrhythmia none        Today she reports chronic edema in right foot/lower leg.   She is in a wheelchair but at home she uses a walker and in the hallways she uses scooter.   She made these changes due to falls and her last fall was over 1 year ago.   When she walks she denies chest pain or pressure, dizziness angina, dyspnea at rest or with exertion, orthopnea, PND.  States she takes medications as prescribed.         ASSESSMENT AND PLAN    Second degree AV block    S/p Biotronik dual chamber permanent pacemaker (4/2018)    Normal device check    Stable leads    Follow with the device clinic on a quarterly basis.     CVA     Currently taking Plavix and atorvastatin      Follow up in 1 year with Bethany Jhaveri and device check prior         Patient expresses understanding and agreement with the plan.     I appreciate the chance to help with Sheila Johnson Please let me know if you have any questions or concerns.    Bethany Jhaveri, APRN, CNP    This note was completed in part using Dragon voice recognition software. Although reviewed after completion, some word and grammatical errors may occur.    Orders Placed This Encounter   Procedures     Follow-Up with Cardiac Advanced Practice Provider     No orders of the defined types were placed in this  encounter.    Medications Discontinued During This Encounter   Medication Reason     meclizine (ANTIVERT) 25 MG tablet Medication Reconciliation Clean Up     Lidocaine (LIDOCARE) 4 % Patch Medication Reconciliation Clean Up         Encounter Diagnoses   Name Primary?     Atrial fibrillation, unspecified type (H)      Atrioventricular block, complete (H) Yes     Cardiac pacemaker in situ        CURRENT MEDICATIONS:  Current Outpatient Medications   Medication Sig Dispense Refill     acetaminophen (TYLENOL) 500 MG tablet Take 2 tablets (1,000 mg) by mouth 3 times daily 1 Bottle 0     ATORVASTATIN CALCIUM PO Take 40 mg by mouth daily.       CITALOPRAM HYDROBROMIDE PO Take 20 mg by mouth daily.       clopidogrel (PLAVIX) 75 MG tablet Take 75 mg by mouth daily       fluticasone (FLONASE) 50 MCG/ACT spray Spray 2 sprays into both nostrils daily        furosemide (LASIX) 20 MG tablet Take 20 mg by mouth daily       insulin detemir (LEVEMIR FLEXPEN/FLEXTOUCH) 100 UNIT/ML injection Inject 35 Units Subcutaneous every morning        menthol-zinc oxide (CALMOSEPTINE) 0.44-20.625 % OINT ointment Apply topically 2 times daily as needed for skin protection       Montelukast Sodium (SINGULAIR PO) Take 10 mg by mouth At Bedtime       order for DME [employer]  Phone #[phone]  Fax #[phone]  Ready To Wear Knee High Compression Stockings  20-30 mmHg   May need carlene and liner   Length of Need: Life Time  # of Pairs 3 (Patient not taking: Reported on 11/17/2020) 3 Device 0     senna-docusate (SENOKOT-S;PERICOLACE) 8.6-50 MG per tablet Take 1 tablet by mouth 2 times daily 100 tablet 0       ALLERGIES   No Known Allergies    PAST MEDICAL HISTORY:  Past Medical History:   Diagnosis Date     Arthritis      AVB (atrioventricular block)      CVA (cerebral vascular accident) (H)      Depressive disorder      Diabetes mellitus (H)      Hypercholesteraemia      Hypertension      LAFB (left anterior fascicular block)   "    LBBB (left bundle branch block)        PAST SURGICAL HISTORY:  Past Surgical History:   Procedure Laterality Date     ORTHOPEDIC SURGERY       RELEASE CARPAL TUNNEL         FAMILY HISTORY:  No family history on file.    SOCIAL HISTORY:  Social History     Socioeconomic History     Marital status:      Spouse name: None     Number of children: None     Years of education: None     Highest education level: None   Occupational History     None   Social Needs     Financial resource strain: None     Food insecurity     Worry: None     Inability: None     Transportation needs     Medical: None     Non-medical: None   Tobacco Use     Smoking status: Former Smoker     Smokeless tobacco: Never Used   Substance and Sexual Activity     Alcohol use: No     Drug use: No     Sexual activity: None   Lifestyle     Physical activity     Days per week: None     Minutes per session: None     Stress: None   Relationships     Social connections     Talks on phone: None     Gets together: None     Attends Mandaen service: None     Active member of club or organization: None     Attends meetings of clubs or organizations: None     Relationship status: None     Intimate partner violence     Fear of current or ex partner: None     Emotionally abused: None     Physically abused: None     Forced sexual activity: None   Other Topics Concern     None   Social History Narrative     None       Review of Systems:  Skin:  Negative     Eyes:  Positive for glasses  ENT:  Negative    Respiratory:  Positive for dyspnea on exertion  Cardiovascular:    Positive for;edema  Gastroenterology: Negative    Genitourinary:  not assessed    Musculoskeletal:  Positive for arthritis;joint stiffness  Neurologic:  Negative    Psychiatric:  Negative    Heme/Lymph/Imm:  Negative    Endocrine:  Positive for diabetes    Physical Exam:  Vitals: /75   Pulse 76   Ht 1.499 m (4' 11\")   Wt 99.3 kg (219 lb)   SpO2 94%   BMI 44.23 kg/m  "     Constitutional:  cooperative, alert and oriented, well developed, well nourished, in no acute distress obese in wheel chair    Skin:           Head:  normocephalic        Eyes:  pupils equal and round        ENT:  no pallor or cyanosis        Neck:  JVP normal        Chest:  clear to auscultation        Cardiac: regular rhythm             murmur    Abdomen:  abdomen soft obese      Vascular:       right radial artery;2+             left radial artery;2+                  Extremities and Back:           Neurological:  no gross motor deficits          Recent Lab Results:  Labs at [facility]         Thank you for allowing me to participate in the care of your patient.    Sincerely,     BELLO Palomo [facility]

## 2021-01-01 ENCOUNTER — OFFICE VISIT (OUTPATIENT)
Dept: CARDIOLOGY | Facility: CLINIC | Age: 86
End: 2021-01-01
Payer: MEDICARE

## 2021-01-01 ENCOUNTER — ANCILLARY PROCEDURE (OUTPATIENT)
Dept: CARDIOLOGY | Facility: CLINIC | Age: 86
End: 2021-01-01
Attending: INTERNAL MEDICINE
Payer: MEDICARE

## 2021-01-01 ENCOUNTER — TELEPHONE (OUTPATIENT)
Dept: CARDIOLOGY | Facility: CLINIC | Age: 86
End: 2021-01-01

## 2021-01-01 ENCOUNTER — TELEPHONE (OUTPATIENT)
Dept: LAB | Facility: CLINIC | Age: 86
End: 2021-01-01

## 2021-01-01 ENCOUNTER — DOCUMENTATION ONLY (OUTPATIENT)
Dept: CARDIOLOGY | Facility: CLINIC | Age: 86
End: 2021-01-01

## 2021-01-01 VITALS — DIASTOLIC BLOOD PRESSURE: 47 MMHG | HEART RATE: 73 BPM | SYSTOLIC BLOOD PRESSURE: 110 MMHG | OXYGEN SATURATION: 96 %

## 2021-01-01 DIAGNOSIS — Z95.0 CARDIAC PACEMAKER IN SITU: ICD-10-CM

## 2021-01-01 DIAGNOSIS — I44.30 AVB (ATRIOVENTRICULAR BLOCK): ICD-10-CM

## 2021-01-01 DIAGNOSIS — I50.30 HEART FAILURE WITH PRESERVED EJECTION FRACTION, NYHA CLASS I (H): Primary | ICD-10-CM

## 2021-01-01 LAB
ANION GAP SERPL CALCULATED.3IONS-SCNC: 11 MMOL/L
BUN SERPL-MCNC: 38 MG/DL
CALCIUM SERPL-MCNC: 10.3 MG/DL
CHLORIDE SERPLBLD-SCNC: 88 MMOL/L
CO2 SERPL-SCNC: 37 MMOL/L
CREAT SERPL-MCNC: 1.24 MG/DL
ERYTHROCYTE [DISTWIDTH] IN BLOOD BY AUTOMATED COUNT: 13.9 %
GFR SERPL CREATININE-BSD FRML MDRD: 36 ML/MIN/1.73M2
GLUCOSE SERPL-MCNC: 101 MG/DL (ref 70–99)
HCT VFR BLD AUTO: 36.7 %
HEMOGLOBIN: 11.5 G/DL (ref 11.7–15.7)
MCH RBC QN AUTO: 29 PG
MCHC RBC AUTO-ENTMCNC: 31 G/DL
MCV RBC AUTO: 91 FL
MDC_IDC_LEAD_IMPLANT_DT: NORMAL
MDC_IDC_LEAD_LOCATION: NORMAL
MDC_IDC_LEAD_LOCATION_DETAIL_1: NORMAL
MDC_IDC_LEAD_MFG: NORMAL
MDC_IDC_LEAD_MODEL: NORMAL
MDC_IDC_LEAD_POLARITY_TYPE: NORMAL
MDC_IDC_LEAD_SERIAL: NORMAL
MDC_IDC_MSMT_BATTERY_REMAINING_PERCENTAGE: 75 %
MDC_IDC_MSMT_BATTERY_REMAINING_PERCENTAGE: 80 %
MDC_IDC_MSMT_BATTERY_STATUS: NORMAL
MDC_IDC_MSMT_BATTERY_STATUS: NORMAL
MDC_IDC_MSMT_LEADCHNL_RA_IMPEDANCE_VALUE: 483 OHM
MDC_IDC_MSMT_LEADCHNL_RA_IMPEDANCE_VALUE: 484 OHM
MDC_IDC_MSMT_LEADCHNL_RA_LEAD_CHANNEL_STATUS: NORMAL
MDC_IDC_MSMT_LEADCHNL_RA_LEAD_CHANNEL_STATUS: NORMAL
MDC_IDC_MSMT_LEADCHNL_RV_IMPEDANCE_VALUE: 557 OHM
MDC_IDC_MSMT_LEADCHNL_RV_IMPEDANCE_VALUE: 582 OHM
MDC_IDC_MSMT_LEADCHNL_RV_LEAD_CHANNEL_STATUS: NORMAL
MDC_IDC_MSMT_LEADCHNL_RV_LEAD_CHANNEL_STATUS: NORMAL
MDC_IDC_PG_IMPLANT_DTM: NORMAL
MDC_IDC_PG_IMPLANT_DTM: NORMAL
MDC_IDC_PG_MFG: NORMAL
MDC_IDC_PG_MFG: NORMAL
MDC_IDC_PG_MODEL: NORMAL
MDC_IDC_PG_MODEL: NORMAL
MDC_IDC_PG_SERIAL: NORMAL
MDC_IDC_PG_SERIAL: NORMAL
MDC_IDC_PG_TYPE: NORMAL
MDC_IDC_PG_TYPE: NORMAL
MDC_IDC_SESS_CLINIC_NAME: NORMAL
MDC_IDC_SESS_CLINIC_NAME: NORMAL
MDC_IDC_SESS_DTM: NORMAL
MDC_IDC_SESS_DTM: NORMAL
MDC_IDC_SESS_REPROGRAMMED: NO
MDC_IDC_SESS_REPROGRAMMED: NO
MDC_IDC_SESS_TYPE: NORMAL
MDC_IDC_SESS_TYPE: NORMAL
MDC_IDC_SET_BRADY_AT_MODE_SWITCH_MODE: NORMAL
MDC_IDC_SET_BRADY_AT_MODE_SWITCH_MODE: NORMAL
MDC_IDC_SET_BRADY_AT_MODE_SWITCH_RATE: 160 {BEATS}/MIN
MDC_IDC_SET_BRADY_AT_MODE_SWITCH_RATE: 160 {BEATS}/MIN
MDC_IDC_SET_BRADY_LOWRATE: 60 {BEATS}/MIN
MDC_IDC_SET_BRADY_LOWRATE: 60 {BEATS}/MIN
MDC_IDC_SET_BRADY_MAX_SENSOR_RATE: 120 {BEATS}/MIN
MDC_IDC_SET_BRADY_MAX_SENSOR_RATE: 120 {BEATS}/MIN
MDC_IDC_SET_BRADY_MAX_TRACKING_RATE: 130 {BEATS}/MIN
MDC_IDC_SET_BRADY_MAX_TRACKING_RATE: 130 {BEATS}/MIN
MDC_IDC_SET_BRADY_MODE: NORMAL
MDC_IDC_SET_BRADY_MODE: NORMAL
MDC_IDC_SET_BRADY_PAV_DELAY_HIGH: 160 MS
MDC_IDC_SET_BRADY_PAV_DELAY_HIGH: 160 MS
MDC_IDC_SET_BRADY_PAV_DELAY_LOW: 200 MS
MDC_IDC_SET_BRADY_PAV_DELAY_LOW: 200 MS
MDC_IDC_SET_BRADY_SAV_DELAY_HIGH: 130 MS
MDC_IDC_SET_BRADY_SAV_DELAY_HIGH: 130 MS
MDC_IDC_SET_BRADY_SAV_DELAY_LOW: 170 MS
MDC_IDC_SET_BRADY_SAV_DELAY_LOW: 170 MS
MDC_IDC_SET_LEADCHNL_RA_PACING_AMPLITUDE: 2 V
MDC_IDC_SET_LEADCHNL_RA_PACING_AMPLITUDE: 2 V
MDC_IDC_SET_LEADCHNL_RA_PACING_POLARITY: NORMAL
MDC_IDC_SET_LEADCHNL_RA_PACING_POLARITY: NORMAL
MDC_IDC_SET_LEADCHNL_RA_PACING_PULSEWIDTH: 0.4 MS
MDC_IDC_SET_LEADCHNL_RA_PACING_PULSEWIDTH: 0.4 MS
MDC_IDC_SET_LEADCHNL_RA_SENSING_ADAPTATION_MODE: NORMAL
MDC_IDC_SET_LEADCHNL_RA_SENSING_ADAPTATION_MODE: NORMAL
MDC_IDC_SET_LEADCHNL_RA_SENSING_POLARITY: NORMAL
MDC_IDC_SET_LEADCHNL_RA_SENSING_POLARITY: NORMAL
MDC_IDC_SET_LEADCHNL_RV_PACING_AMPLITUDE: 2.4 V
MDC_IDC_SET_LEADCHNL_RV_PACING_AMPLITUDE: 2.4 V
MDC_IDC_SET_LEADCHNL_RV_PACING_POLARITY: NORMAL
MDC_IDC_SET_LEADCHNL_RV_PACING_POLARITY: NORMAL
MDC_IDC_SET_LEADCHNL_RV_PACING_PULSEWIDTH: 0.4 MS
MDC_IDC_SET_LEADCHNL_RV_PACING_PULSEWIDTH: 0.4 MS
MDC_IDC_SET_LEADCHNL_RV_SENSING_ADAPTATION_MODE: NORMAL
MDC_IDC_SET_LEADCHNL_RV_SENSING_ADAPTATION_MODE: NORMAL
MDC_IDC_SET_LEADCHNL_RV_SENSING_POLARITY: NORMAL
MDC_IDC_SET_LEADCHNL_RV_SENSING_POLARITY: NORMAL
MDC_IDC_STAT_AT_BURDEN_PERCENT: 0 %
MDC_IDC_STAT_AT_BURDEN_PERCENT: 0 %
MDC_IDC_STAT_AT_DTM_END: NORMAL
MDC_IDC_STAT_AT_DTM_END: NORMAL
MDC_IDC_STAT_AT_DTM_START: NORMAL
MDC_IDC_STAT_AT_DTM_START: NORMAL
MDC_IDC_STAT_AT_MODE_SW_COUNT_PER_DAY: 0
MDC_IDC_STAT_AT_MODE_SW_COUNT_PER_DAY: 0
MDC_IDC_STAT_AT_MODE_SW_PERCENT_TIME_PER_DAY: 0 %
MDC_IDC_STAT_AT_MODE_SW_PERCENT_TIME_PER_DAY: 0 %
MDC_IDC_STAT_BRADY_AP_VP_PERCENT: 1 %
MDC_IDC_STAT_BRADY_AP_VP_PERCENT: 3 %
MDC_IDC_STAT_BRADY_AP_VS_PERCENT: 0 %
MDC_IDC_STAT_BRADY_AP_VS_PERCENT: 0 %
MDC_IDC_STAT_BRADY_AS_VP_PERCENT: 96 %
MDC_IDC_STAT_BRADY_AS_VP_PERCENT: 98 %
MDC_IDC_STAT_BRADY_AS_VS_PERCENT: 0 %
MDC_IDC_STAT_BRADY_AS_VS_PERCENT: 0 %
MDC_IDC_STAT_BRADY_DTM_END: NORMAL
MDC_IDC_STAT_BRADY_DTM_END: NORMAL
MDC_IDC_STAT_BRADY_DTM_START: NORMAL
MDC_IDC_STAT_BRADY_DTM_START: NORMAL
MDC_IDC_STAT_BRADY_RA_PERCENT_PACED: 1 %
MDC_IDC_STAT_BRADY_RA_PERCENT_PACED: 3 %
MDC_IDC_STAT_BRADY_RV_PERCENT_PACED: 99 %
MDC_IDC_STAT_BRADY_RV_PERCENT_PACED: 99 %
MDC_IDC_STAT_CRT_DTM_END: NORMAL
MDC_IDC_STAT_CRT_DTM_END: NORMAL
MDC_IDC_STAT_CRT_DTM_START: NORMAL
MDC_IDC_STAT_CRT_DTM_START: NORMAL
PLATELET # BLD AUTO: 401 10^9/L
POTASSIUM SERPL-SCNC: 3.6 MMOL/L
RBC # BLD AUTO: 4.03 10^12/L
SODIUM SERPL-SCNC: 136 MMOL/L
WBC # BLD AUTO: 11.5 10^9/L

## 2021-01-01 PROCEDURE — 93296 REM INTERROG EVL PM/IDS: CPT | Performed by: INTERNAL MEDICINE

## 2021-01-01 PROCEDURE — 93294 REM INTERROG EVL PM/LDLS PM: CPT | Performed by: INTERNAL MEDICINE

## 2021-01-01 PROCEDURE — 99215 OFFICE O/P EST HI 40 MIN: CPT | Performed by: PHYSICIAN ASSISTANT

## 2021-01-01 RX ORDER — ALBUTEROL SULFATE 90 UG/1
2 AEROSOL, METERED RESPIRATORY (INHALATION)
COMMUNITY
Start: 2021-01-01

## 2021-01-01 RX ORDER — FLUTICASONE PROPIONATE 110 UG/1
1 AEROSOL, METERED RESPIRATORY (INHALATION) 2 TIMES DAILY
COMMUNITY

## 2021-01-01 RX ORDER — METRONIDAZOLE 500 MG/1
500 TABLET ORAL 3 TIMES DAILY
COMMUNITY

## 2021-01-01 RX ORDER — ONDANSETRON 4 MG/1
4 TABLET, ORALLY DISINTEGRATING ORAL EVERY 8 HOURS PRN
COMMUNITY

## 2021-01-01 RX ORDER — FUROSEMIDE 40 MG
40 TABLET ORAL 2 TIMES DAILY
COMMUNITY

## 2021-01-01 RX ORDER — INSULIN ASPART 100 [IU]/ML
INJECTION, SOLUTION INTRAVENOUS; SUBCUTANEOUS
COMMUNITY

## 2021-01-01 RX ORDER — POLYETHYLENE GLYCOL 3350 17 G/17G
1 POWDER, FOR SOLUTION ORAL DAILY
COMMUNITY

## 2021-01-01 RX ORDER — CETIRIZINE HYDROCHLORIDE 10 MG/1
TABLET ORAL
COMMUNITY
Start: 2021-01-01

## 2021-06-07 NOTE — PROGRESS NOTES
[facility]    HISTORY OF PRESENT ILLNESS: Ms. Sheila Johnson is a 89-year-old woman who returns today to follow-up on a wound on her right lower leg. The wound started after bumping into something, she believes it was a bed frame.     We were previously dressing her wounds once weekly in our clinic with compression wraps. She had a lot of drainage and some breakdown of skin so we decided to initiate home health care two weeks ago and she has had significant improvement.     DATE WOUND ACQUIRED: 7/28/17    ROS:  CONSTITUTIONAL: denies fevers or acute illness  INTEGUMENTARY: denies new wounds    PAST MEDICAL HISTORY: bilateral peripheral edema, CKD stage 3, type 2 DM, dissection of vertebral artery, dyslipidemia, HTN, lung nodule, PAD    SOCIAL HISTORY: recently moved to [place], has family in town who can help with dressings as needed    MEDICATIONS: reviewed, see med rec for updated list    VITALS: /71  Pulse 77  Temp 97.4  F (36.3  C) (Tympanic)    PHYSICAL EXAM:  GENERAL: Patient is alert and oriented and in no acute distress  INTEGUMENTARY: right lower leg wounds have epithelialized    PROCEDURE: No procedure necessary today.    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 Referral to [facility] for compression socks. Needs to wear 20-30mmHg for next two months minimum then light compression lifelong.     FOLLOW-UP: SON MELCHOR PA-C     74

## 2021-07-07 NOTE — TELEPHONE ENCOUNTER
Received call from KENYON Madera who is caring for this patient at [facility].  Patient presented with infected hip.  Echocardiogram was done which shows new finding of severe aortic stenosis.  Patient and family are not interested in TAVR work up at this time.    Patient needs a hospital follow up.  Will arrange for KENYON visit post hospital.

## 2021-07-15 NOTE — LETTER
7/15/2021    Thania Cadena  [facility] [address]  [facility] [address]    RE: Sheila Johnson       Dear Colleague,    I had the pleasure of seeing Sheila Johnson in the [facility].    Primary Cardiologist: Dr. Beebe (follow up on pacemaker; last visit 2019)/Dr. Salter (last visit in 2008)    Reason for Visit: Hospital follow up    History of Present Illness:   Sheila is a 89-year-old female who was admitted at [facility] recently due to infected prosthetic left knee (6/29-7/7).  She required I&D as well as radical synovectomy and polyethylene liner exchange.  She was initiated on IV antibiotics.  She was also found to be in acute on chronic heart failure with preserved ejection fraction.  She was IV diuresed.  Her PTA diuretic regimen was increased from furosemide 20 mg once a day to 40 mg twice daily.  Per notes, she lost 4 L. She was discharged on oxygen therapy. She was also found to have new left lower extremity DVT and was initiated on anticoagulation for this.  Echocardiogram was ordered as part of her work-up and this showed severe aortic valve stenosis with valve area of 0.7 cm , mean gradient of 38 mmHg, dimensionless index was 0.22, and LVEF 55 to 60%.  Patient is DNR/DNI.  Possible referral for TAVR was discussed but patient and family were not interested.  She wanted to follow-up with our cardiology group for further management of her diuretics.    She was previously seen by Dr. Salter in the distant past and more recently she followed up with Dr. Beebe for management of her pacemaker that was implanted due to symptomatic high degree AV block.  She also carries history of mild mitral stenosis, history of CVA (on Plavix), insulin dependent DM, CKD, and hyperlipidemia.    Sheila presents to clinic with her daughter today.  She feels weak and reports tingling in her feet as well as overall joint discomfort.  She  continues to be on supplemental oxygen usually requiring 2 LPM.  Her weight is checked almost daily at her facility in [place] but it seems to be quite labile and I do not have a clear sense of whether her weight is trending up or staying stable.  She continues to be on furosemide 40 mg twice daily.  She denies orthopnea, abdominal distention, or PND.  She feels like she has poor quality of sleep and is tired all the time.  Prior to her admission she was able to ambulate with a walker and now she is completely wheelchair dependent.  She is not interested in too many medical appointments and her CODE STATUS has been DNR/DNI.  Daughter reports that palliative care has been mentioned in the past but they have not talked with their primary provider for a referral for this.  Patient continues to have a PICC line in for continued IV antibiotics and has follow-up with infection disease as well as Ortho team.  Patient expresses that he/she would like to minimize clinic visits if at all possible.  She denies palpitations, syncope, bleeding issues, or chest discomfort.    Assessment and Plan:  Sheila is a 89-year-old female who is DNR/DNI with past medical history notable for severe aortic stenosis, heart failure with preserved ejection fraction, hypertension, mild mitral stenosis, history of CVA, insulin-dependent diabetes, CKD, history of symptomatic high degree AV block status post pacemaker implantation, and hyperlipidemia.    I have reviewed Sheila's recent admission notes in great detail.  I have also reviewed her progress notes from her facility.  As stated above I do not have a clear sense of whether her weight is trending up or staying the same.  Her blood pressures on the soft side today.  She has not had repeat blood work since her hospital discharge.  I recommended that we obtain CBC as well as BMP.  We will continue with current medications at this time.  I also discussed with Sheila and her daughter regarding  referral for palliative care.  Patient is DNR/DNI and wishes to minimize clinical appointments.  They once again expressed they are not interested in any sort of invasive procedure including TAVR.  I explained pathophysiology of aortic stenosis and high mortality rate associated with this which is up to 50% at 1 year.  They verbalized understanding and expressed desire to continue with conservative care.    Sheila asked if she needs to be seen here in the future.  I described and explained to the patient and her daughter that if she does comfort care they would not need to follow up with us. The focus would shift to treating her symptoms only and not requiring frequent clinic visits as well as blood work.  They seemed interested in this.  They would like to discuss it with their primary care provider and decide if they would like a referral to palliative care.  Until then we will obtain blood work as stated above and consider adjusting her diuretic medications based on that.  I will set them up with a follow-up in about 3 to 4 weeks ideally with one of her cardiologist as they have not been seen by anybody in the recent years.  If they do decide to proceed with palliative care they will cancel this appointment.  Patient's daughter expressed desire to go ahead and schedule follow-up with one of her cardiologist today.  Both patient and her daughter also asked why she feels so poorly since her hospital stay.  I explained that patient had significant infection requiring IV antibiotics as well as hypervolemia after her recent knee operation all of which can take a toll in someone's body and recovery following all of this can be very slow and challenging.  She was also diagnosed with severe aortic stenosis which causes her heart to work very hard.  We once again discussed the possibly proceeding with a different care approach where we would focus on her symptoms rather than test and imaging results.  Once again they were  interested in this.      70 minutes spent on the date of the encounter with chart review, patient visit, care coordination, and documentation.      This note was completed in part using Dragon voice recognition software. Although reviewed after completion, some word and grammatical errors may occur.    Orders this Visit:  Orders Placed This Encounter   Procedures     Basic metabolic panel     N terminal pro BNP outpatient     Follow-Up with Cardiologist     Orders Placed This Encounter   Medications     CALCIUM 1000 + D 1000-800 MG-UNIT TABS     Sig: Take 1,250 mg by mouth daily     albuterol (PROAIR HFA/PROVENTIL HFA/VENTOLIN HFA) 108 (90 Base) MCG/ACT inhaler     Sig: Inhale 2 puffs into the lungs     vitamin B-12 (CYANOCOBALAMIN) 500 MCG tablet     Sig: Take 500 mcg by mouth     cetirizine (ZYRTEC) 10 MG tablet     Sig: TAKE 1 TABLET (10 MG) BY MOUTH ONCE DAILY.     apixaban ANTICOAGULANT (ELIQUIS) 2.5 MG tablet     Sig: Take 2.5 mg by mouth 2 times daily     fluticasone (FLOVENT HFA) 110 MCG/ACT inhaler     Sig: Inhale 1 puff into the lungs 2 times daily     furosemide (LASIX) 40 MG tablet     Sig: Take 40 mg by mouth 2 times daily     insulin glargine (LANTUS PEN) 100 UNIT/ML pen     Sig: Inject Subcutaneous At Bedtime     metroNIDAZOLE (FLAGYL) 500 MG tablet     Sig: Take 500 mg by mouth 3 times daily     polyethylene glycol (MIRALAX) 17 g packet     Sig: Take 1 packet by mouth daily     insulin aspart (NOVOLOG FLEXPEN) 100 UNIT/ML pen     ondansetron (ZOFRAN-ODT) 4 MG ODT tab     Sig: Take 4 mg by mouth every 8 hours as needed for nausea     diclofenac (VOLTAREN) 1 % topical gel     Sig: Apply topically 4 times daily     Medications Discontinued During This Encounter   Medication Reason     furosemide (LASIX) 20 MG tablet Dose adjustment         Encounter Diagnosis   Name Primary?     Heart failure with preserved ejection fraction, NYHA class I (H) Yes       CURRENT MEDICATIONS:  Current Outpatient  Medications   Medication Sig Dispense Refill     acetaminophen (TYLENOL) 500 MG tablet Take 2 tablets (1,000 mg) by mouth 3 times daily 1 Bottle 0     albuterol (PROAIR HFA/PROVENTIL HFA/VENTOLIN HFA) 108 (90 Base) MCG/ACT inhaler Inhale 2 puffs into the lungs       apixaban ANTICOAGULANT (ELIQUIS) 2.5 MG tablet Take 2.5 mg by mouth 2 times daily       ATORVASTATIN CALCIUM PO Take 40 mg by mouth daily.       CALCIUM 1000 + D 1000-800 MG-UNIT TABS Take 1,250 mg by mouth daily       cetirizine (ZYRTEC) 10 MG tablet TAKE 1 TABLET (10 MG) BY MOUTH ONCE DAILY.       CITALOPRAM HYDROBROMIDE PO Take 10 mg by mouth daily        clopidogrel (PLAVIX) 75 MG tablet Take 75 mg by mouth daily       diclofenac (VOLTAREN) 1 % topical gel Apply topically 4 times daily       fluticasone (FLONASE) 50 MCG/ACT spray Spray 2 sprays into both nostrils daily        fluticasone (FLOVENT HFA) 110 MCG/ACT inhaler Inhale 1 puff into the lungs 2 times daily       furosemide (LASIX) 40 MG tablet Take 40 mg by mouth 2 times daily       insulin aspart (NOVOLOG FLEXPEN) 100 UNIT/ML pen        insulin glargine (LANTUS PEN) 100 UNIT/ML pen Inject Subcutaneous At Bedtime       metroNIDAZOLE (FLAGYL) 500 MG tablet Take 500 mg by mouth 3 times daily       ondansetron (ZOFRAN-ODT) 4 MG ODT tab Take 4 mg by mouth every 8 hours as needed for nausea       polyethylene glycol (MIRALAX) 17 g packet Take 1 packet by mouth daily       insulin detemir (LEVEMIR FLEXPEN/FLEXTOUCH) 100 UNIT/ML injection Inject 35 Units Subcutaneous every morning        menthol-zinc oxide (CALMOSEPTINE) 0.44-20.625 % OINT ointment Apply topically 2 times daily as needed for skin protection       Montelukast Sodium (SINGULAIR PO) Take 10 mg by mouth At Bedtime       order for DME [employer]  Phone #[phone]  Fax #[phone]  Ready To Wear Knee High Compression Stockings  20-30 mmHg   May need carlene and liner   Length of Need: Life Time  # of Pairs 3 (Patient  not taking: Reported on 11/17/2020) 3 Device 0     senna-docusate (SENOKOT-S;PERICOLACE) 8.6-50 MG per tablet Take 1 tablet by mouth 2 times daily 100 tablet 0     vitamin B-12 (CYANOCOBALAMIN) 500 MCG tablet Take 500 mcg by mouth         ALLERGIES   No Known Allergies    PAST MEDICAL HISTORY:  Past Medical History:   Diagnosis Date     Arthritis      AVB (atrioventricular block)      CVA (cerebral vascular accident) (H)      Depressive disorder      Diabetes mellitus (H)      Hypercholesteraemia      Hypertension      LAFB (left anterior fascicular block)      LBBB (left bundle branch block)        PAST SURGICAL HISTORY:  Past Surgical History:   Procedure Laterality Date     ORTHOPEDIC SURGERY       RELEASE CARPAL TUNNEL         FAMILY HISTORY:  History reviewed. No pertinent family history.    SOCIAL HISTORY:  Social History     Socioeconomic History     Marital status:      Spouse name: None     Number of children: None     Years of education: None     Highest education level: None   Occupational History     None   Tobacco Use     Smoking status: Former Smoker     Smokeless tobacco: Never Used   Substance and Sexual Activity     Alcohol use: No     Drug use: No     Sexual activity: None   Other Topics Concern     None   Social History Narrative     None     Social Determinants of Health     Financial Resource Strain:      Difficulty of Paying Living Expenses:    Food Insecurity:      Worried About Running Out of Food in the Last Year:      Ran Out of Food in the Last Year:    Transportation Needs:      Lack of Transportation (Medical):      Lack of Transportation (Non-Medical):    Physical Activity:      Days of Exercise per Week:      Minutes of Exercise per Session:    Stress:      Feeling of Stress :    Social Connections:      Frequency of Communication with Friends and Family:      Frequency of Social Gatherings with Friends and Family:      Attends [employer] Services:      Active Member of Clubs or  Organizations:      Attends Club or Organization Meetings:      Marital Status:    Intimate Partner Violence:      Fear of Current or Ex-Partner:      Emotionally Abused:      Physically Abused:      Sexually Abused:        Review of Systems:  Negative other than what is stated in the HPI.    Physical Exam:  Vitals: /47   Pulse 73   SpO2 96%      GEN:  NAD  NECK: Unable to assess JVP due neck size.  C/V:  Irregularly irregular rhythm with normal rate. 3/6 GOMEZ at RUSB.   RESP: Fine crackles at the bases bilaterally.  GI: Abdomen soft, nontender, nondistended. No HSM appreciated.   EXTREM: 1+ pitting LE edema.   NEURO: Alert and oriented, cooperative. No obvious focal deficits.   PSYCH: Normal affect.  SKIN: Warm and dry.       Recent Lab Results:  LIPID RESULTS:  Lab Results   Component Value Date    CHOL 144 04/26/2018    HDL 50 04/26/2018    LDL 71 04/26/2018    TRIG 114 04/26/2018    CHOLHDLRATIO 3.0 05/26/2009       LIVER ENZYME RESULTS:  Lab Results   Component Value Date    AST 13 04/26/2018    ALT 12 04/26/2018       CBC RESULTS:  Lab Results   Component Value Date    WBC 11.5 06/29/2021    RBC 4.03 06/29/2021    HGB 11.5 (A) 06/29/2021    HCT 36.7 06/29/2021    MCV 91 06/29/2021    MCH 29 06/29/2021    MCHC 31 06/29/2021    RDW 13.9 06/29/2021     06/29/2021       BMP RESULTS:  Lab Results   Component Value Date     07/07/2021    POTASSIUM 3.6 07/07/2021    CHLORIDE 88 07/07/2021    CO2 37 07/07/2021    ANIONGAP 11 07/07/2021     (A) 07/07/2021    BUN 38 07/07/2021    CR 1.24 07/07/2021    GFRESTIMATED 36 07/07/2021    GFRESTBLACK 42 07/07/2021    JAVON 10.3 07/07/2021        A1C RESULTS:  Lab Results   Component Value Date    A1C 10.1 (H) 05/23/2018       INR RESULTS:  Lab Results   Component Value Date    INR 1.07 05/06/2011    INR 1.43 (H) 06/29/2009           Lorin Finn PA-C  July 15, 2021         Thank you for allowing me to participate in the care of your  patient.      Sincerely,     ARTUR [facility]  cc:   No referring provider defined for this encounter.

## 2021-07-15 NOTE — PROGRESS NOTES
Primary Cardiologist: Dr. Beebe (follow up on pacemaker; last visit 2019)/Dr. Salter (last visit in 2008)    Reason for Visit: Hospital follow up    History of Present Illness:   Sheila is a 89-year-old female who was admitted at [facility] due to infected prosthetic left knee (6/29-7/7).  She required I&D as well as radical synovectomy and polyethylene liner exchange.  She was initiated on IV antibiotics.  She was also found to be in acute on chronic heart failure with preserved ejection fraction.  She was IV diuresed.  Her PTA diuretic regimen was increased from furosemide 20 mg once a day to 40 mg twice daily.  Per notes, she lost 4 L. She was discharged on oxygen therapy. She was also found to have new left lower extremity DVT and was initiated on anticoagulation for this.  Echocardiogram was ordered as part of her work-up and this showed severe aortic valve stenosis with valve area of 0.7 cm , mean gradient of 38 mmHg, dimensionless index was 0.22, and LVEF 55 to 60%.  Patient is DNR/DNI.  Possible referral for TAVR was discussed but patient and family were not interested.  She wanted to follow-up with our cardiology group for further management of her diuretics.    She was previously seen by Dr. Salter in the distant past and more recently she followed up with Dr. Beebe for management of her pacemaker that was implanted due to symptomatic high degree AV block.  She also carries history of mild mitral stenosis, history of CVA (on Plavix), insulin dependent DM, CKD, and hyperlipidemia.    Sheila presents to clinic with her daughter today.  She feels weak and reports tingling in her feet as well as overall joint discomfort.  She continues to be on supplemental oxygen usually requiring 2 LPM.  Her weight is checked almost daily at her facility in [place] but it seems to be quite labile and I do not have a clear sense of whether her weight is trending up or staying stable.  She continues to be on  furosemide 40 mg twice daily.  She denies orthopnea, abdominal distention, or PND.  She feels like she has poor quality of sleep and is tired all the time.  Prior to her admission she was able to ambulate with a walker and now she is completely wheelchair dependent.  She is not interested in too many medical appointments and her CODE STATUS has been DNR/DNI.  Daughter reports that palliative care has been mentioned in the past but they have not talked with their primary provider for a referral for this.  Patient continues to have a PICC line in for continued IV antibiotics and has follow-up with infection disease as well as Ortho team.  Patient expresses that he/she would like to minimize clinic visits if at all possible.  She denies palpitations, syncope, bleeding issues, or chest discomfort.    Assessment and Plan:  Sheila is a 89-year-old female who is DNR/DNI with past medical history notable for severe aortic stenosis, heart failure with preserved ejection fraction, hypertension, mild mitral stenosis, history of CVA, insulin-dependent diabetes, CKD, history of symptomatic high degree AV block status post pacemaker implantation, and hyperlipidemia.    I have reviewed Sheila's recent admission notes in great detail.  I have also reviewed her progress notes from her facility.  As stated above I do not have a clear sense of whether her weight is trending up or staying the same.  Her blood pressures on the soft side today.  She has not had repeat blood work since her hospital discharge.  I recommended that we obtain CBC as well as BMP.  We will continue with current medications at this time.  I also discussed with Sheila and her daughter regarding referral for palliative care.  Patient is DNR/DNI and wishes to minimize clinical appointments.  They once again expressed they are not interested in any sort of invasive procedure including TAVR.  I explained pathophysiology of aortic stenosis and high mortality rate  associated with this which is up to 50% at 1 year.  They verbalized understanding and expressed desire to continue with conservative care.    Sheila asked if she needs to be seen here in the future.  I described and explained to the patient and her daughter that if she does comfort care they would not need to follow up with us. The focus would shift to treating her symptoms only and not requiring frequent clinic visits as well as blood work.  They seemed interested in this.  They would like to discuss it with their primary care provider and decide if they would like a referral to palliative care.  Until then we will obtain blood work as stated above and consider adjusting her diuretic medications based on that.  I will set them up with a follow-up in about 3 to 4 weeks ideally with one of her cardiologist as they have not been seen by anybody in the recent years.  If they do decide to proceed with palliative care they will cancel this appointment.  Patient's daughter expressed desire to go ahead and schedule follow-up with one of her cardiologist today.  Both patient and her daughter also asked why she feels so poorly since her hospital stay.  I explained that patient had significant infection requiring IV antibiotics as well as hypervolemia after her recent knee operation all of which can take a toll in someone's body and recovery following all of this can be very slow and challenging.  She was also diagnosed with severe aortic stenosis which causes her heart to work very hard.  We once again discussed the possibly proceeding with a different care approach where we would focus on her symptoms rather than test and imaging results.  Once again they were interested in this.      70 minutes spent on the date of the encounter with chart review, patient visit, care coordination, and documentation.      This note was completed in part using Dragon voice recognition software. Although reviewed after completion, some word  and grammatical errors may occur.    Orders this Visit:  Orders Placed This Encounter   Procedures     Basic metabolic panel     N terminal pro BNP outpatient     Follow-Up with Cardiologist     Orders Placed This Encounter   Medications     CALCIUM 1000 + D 1000-800 MG-UNIT TABS     Sig: Take 1,250 mg by mouth daily     albuterol (PROAIR HFA/PROVENTIL HFA/VENTOLIN HFA) 108 (90 Base) MCG/ACT inhaler     Sig: Inhale 2 puffs into the lungs     vitamin B-12 (CYANOCOBALAMIN) 500 MCG tablet     Sig: Take 500 mcg by mouth     cetirizine (ZYRTEC) 10 MG tablet     Sig: TAKE 1 TABLET (10 MG) BY MOUTH ONCE DAILY.     apixaban ANTICOAGULANT (ELIQUIS) 2.5 MG tablet     Sig: Take 2.5 mg by mouth 2 times daily     fluticasone (FLOVENT HFA) 110 MCG/ACT inhaler     Sig: Inhale 1 puff into the lungs 2 times daily     furosemide (LASIX) 40 MG tablet     Sig: Take 40 mg by mouth 2 times daily     insulin glargine (LANTUS PEN) 100 UNIT/ML pen     Sig: Inject Subcutaneous At Bedtime     metroNIDAZOLE (FLAGYL) 500 MG tablet     Sig: Take 500 mg by mouth 3 times daily     polyethylene glycol (MIRALAX) 17 g packet     Sig: Take 1 packet by mouth daily     insulin aspart (NOVOLOG FLEXPEN) 100 UNIT/ML pen     ondansetron (ZOFRAN-ODT) 4 MG ODT tab     Sig: Take 4 mg by mouth every 8 hours as needed for nausea     diclofenac (VOLTAREN) 1 % topical gel     Sig: Apply topically 4 times daily     Medications Discontinued During This Encounter   Medication Reason     furosemide (LASIX) 20 MG tablet Dose adjustment         Encounter Diagnosis   Name Primary?     Heart failure with preserved ejection fraction, NYHA class I (H) Yes       CURRENT MEDICATIONS:  Current Outpatient Medications   Medication Sig Dispense Refill     acetaminophen (TYLENOL) 500 MG tablet Take 2 tablets (1,000 mg) by mouth 3 times daily 1 Bottle 0     albuterol (PROAIR HFA/PROVENTIL HFA/VENTOLIN HFA) 108 (90 Base) MCG/ACT inhaler Inhale 2 puffs into the lungs       apixaban  ANTICOAGULANT (ELIQUIS) 2.5 MG tablet Take 2.5 mg by mouth 2 times daily       ATORVASTATIN CALCIUM PO Take 40 mg by mouth daily.       CALCIUM 1000 + D 1000-800 MG-UNIT TABS Take 1,250 mg by mouth daily       cetirizine (ZYRTEC) 10 MG tablet TAKE 1 TABLET (10 MG) BY MOUTH ONCE DAILY.       CITALOPRAM HYDROBROMIDE PO Take 10 mg by mouth daily        clopidogrel (PLAVIX) 75 MG tablet Take 75 mg by mouth daily       diclofenac (VOLTAREN) 1 % topical gel Apply topically 4 times daily       fluticasone (FLONASE) 50 MCG/ACT spray Spray 2 sprays into both nostrils daily        fluticasone (FLOVENT HFA) 110 MCG/ACT inhaler Inhale 1 puff into the lungs 2 times daily       furosemide (LASIX) 40 MG tablet Take 40 mg by mouth 2 times daily       insulin aspart (NOVOLOG FLEXPEN) 100 UNIT/ML pen        insulin glargine (LANTUS PEN) 100 UNIT/ML pen Inject Subcutaneous At Bedtime       metroNIDAZOLE (FLAGYL) 500 MG tablet Take 500 mg by mouth 3 times daily       ondansetron (ZOFRAN-ODT) 4 MG ODT tab Take 4 mg by mouth every 8 hours as needed for nausea       polyethylene glycol (MIRALAX) 17 g packet Take 1 packet by mouth daily       insulin detemir (LEVEMIR FLEXPEN/FLEXTOUCH) 100 UNIT/ML injection Inject 35 Units Subcutaneous every morning        menthol-zinc oxide (CALMOSEPTINE) 0.44-20.625 % OINT ointment Apply topically 2 times daily as needed for skin protection       Montelukast Sodium (SINGULAIR PO) Take 10 mg by mouth At Bedtime       order for DME [employer]  Phone #[phone]  Fax #[phone]  Ready To Wear Knee High Compression Stockings  20-30 mmHg   May need carlene and liner   Length of Need: Life Time  # of Pairs 3 (Patient not taking: Reported on 11/17/2020) 3 Device 0     senna-docusate (SENOKOT-S;PERICOLACE) 8.6-50 MG per tablet Take 1 tablet by mouth 2 times daily 100 tablet 0     vitamin B-12 (CYANOCOBALAMIN) 500 MCG tablet Take 500 mcg by mouth         ALLERGIES   No Known  Allergies    PAST MEDICAL HISTORY:  Past Medical History:   Diagnosis Date     Arthritis      AVB (atrioventricular block)      CVA (cerebral vascular accident) (H)      Depressive disorder      Diabetes mellitus (H)      Hypercholesteraemia      Hypertension      LAFB (left anterior fascicular block)      LBBB (left bundle branch block)        PAST SURGICAL HISTORY:  Past Surgical History:   Procedure Laterality Date     ORTHOPEDIC SURGERY       RELEASE CARPAL TUNNEL         FAMILY HISTORY:  History reviewed. No pertinent family history.    SOCIAL HISTORY:  Social History     Socioeconomic History     Marital status:      Spouse name: None     Number of children: None     Years of education: None     Highest education level: None   Occupational History     None   Tobacco Use     Smoking status: Former Smoker     Smokeless tobacco: Never Used   Substance and Sexual Activity     Alcohol use: No     Drug use: No     Sexual activity: None   Other Topics Concern     None   Social History Narrative     None     Social Determinants of Health     Financial Resource Strain:      Difficulty of Paying Living Expenses:    Food Insecurity:      Worried About Running Out of Food in the Last Year:      Ran Out of Food in the Last Year:    Transportation Needs:      Lack of Transportation (Medical):      Lack of Transportation (Non-Medical):    Physical Activity:      Days of Exercise per Week:      Minutes of Exercise per Session:    Stress:      Feeling of Stress :    Social Connections:      Frequency of Communication with Friends and Family:      Frequency of Social Gatherings with Friends and Family:      Attends Religion Services:      Active Member of Clubs or Organizations:      Attends Club or Organization Meetings:      Marital Status:    Intimate Partner Violence:      Fear of Current or Ex-Partner:      Emotionally Abused:      Physically Abused:      Sexually Abused:        Review of Systems:  Negative other  than what is stated in the HPI.    Physical Exam:  Vitals: /47   Pulse 73   SpO2 96%      GEN:  NAD  NECK: Unable to assess JVP due neck size.  C/V:  Irregularly irregular rhythm with normal rate. 3/6 GOMEZ at RUSB.   RESP: Fine crackles at the bases bilaterally.  GI: Abdomen soft, nontender, nondistended. No HSM appreciated.   EXTREM: 1+ pitting LE edema.   NEURO: Alert and oriented, cooperative. No obvious focal deficits.   PSYCH: Normal affect.  SKIN: Warm and dry.       Recent Lab Results:  LIPID RESULTS:  Lab Results   Component Value Date    CHOL 144 04/26/2018    HDL 50 04/26/2018    LDL 71 04/26/2018    TRIG 114 04/26/2018    CHOLHDLRATIO 3.0 05/26/2009       LIVER ENZYME RESULTS:  Lab Results   Component Value Date    AST 13 04/26/2018    ALT 12 04/26/2018       CBC RESULTS:  Lab Results   Component Value Date    WBC 11.5 06/29/2021    RBC 4.03 06/29/2021    HGB 11.5 (A) 06/29/2021    HCT 36.7 06/29/2021    MCV 91 06/29/2021    MCH 29 06/29/2021    MCHC 31 06/29/2021    RDW 13.9 06/29/2021     06/29/2021       BMP RESULTS:  Lab Results   Component Value Date     07/07/2021    POTASSIUM 3.6 07/07/2021    CHLORIDE 88 07/07/2021    CO2 37 07/07/2021    ANIONGAP 11 07/07/2021     (A) 07/07/2021    BUN 38 07/07/2021    CR 1.24 07/07/2021    GFRESTIMATED 36 07/07/2021    GFRESTBLACK 42 07/07/2021    JAVON 10.3 07/07/2021        A1C RESULTS:  Lab Results   Component Value Date    A1C 10.1 (H) 05/23/2018       INR RESULTS:  Lab Results   Component Value Date    INR 1.07 05/06/2011    INR 1.43 (H) 06/29/2009           Lorin Finn PA-C  July 15, 2021

## 2021-07-15 NOTE — PATIENT INSTRUCTIONS
Today's Plan:   1) Continue with torsemide but I would like to get blood work. This can be done at your facility.   2) Follow up with us in one month with repeat blood work.   3) You need low salt diet (less than 2,000 mg per day).    4) Your weight needs to be checked daily.     If you have questions or concerns please call my nurse team at [phone].     Scheduling phone number: [phone]  Reminder: Please bring in all current medications, over the counter supplements and vitamin bottles to your next appointment.    It was a pleasure seeing you today!     Lorin Finn PA-C  7/15/2021

## 2021-08-04 NOTE — TELEPHONE ENCOUNTER
Received call from patient's daughter, Ashly stating patient does not want to come to clinic to be seen.  It is too hard for her to make the trip.   They do not want to discuss TAVR or valve replacement.  Will go ahead and get her BMP and BNP drawn through her care facility.  Orders have been faxed to [phone].  Will cancel her upcoming appointment with Dr. Salter on 8/30/21.

## 2021-08-05 NOTE — TELEPHONE ENCOUNTER
Lab results from 8/5/21:  BNP = 436  BMP = K-4.0, creat-1.0, GFR-47.  These were called to her daughter, Carole.  No need for changes per [facility] at this time.  Will call back if they change their mind regarding TAVR workup.  At this time they do not want to proceed further with TAVR.

## (undated) RX ORDER — LIDOCAINE HYDROCHLORIDE 10 MG/ML
INJECTION, SOLUTION EPIDURAL; INFILTRATION; INTRACAUDAL; PERINEURAL
Status: DISPENSED
Start: 2018-04-27

## (undated) RX ORDER — CEFAZOLIN SODIUM 2 G/100ML
INJECTION, SOLUTION INTRAVENOUS
Status: DISPENSED
Start: 2018-04-27

## (undated) RX ORDER — BUPIVACAINE HYDROCHLORIDE 2.5 MG/ML
INJECTION, SOLUTION EPIDURAL; INFILTRATION; INTRACAUDAL
Status: DISPENSED
Start: 2018-04-27

## (undated) RX ORDER — HEPARIN SODIUM 1000 [USP'U]/ML
INJECTION, SOLUTION INTRAVENOUS; SUBCUTANEOUS
Status: DISPENSED
Start: 2018-04-27

## (undated) RX ORDER — FENTANYL CITRATE 50 UG/ML
INJECTION, SOLUTION INTRAMUSCULAR; INTRAVENOUS
Status: DISPENSED
Start: 2018-04-27